# Patient Record
Sex: MALE | Race: BLACK OR AFRICAN AMERICAN | NOT HISPANIC OR LATINO | Employment: OTHER | URBAN - METROPOLITAN AREA
[De-identification: names, ages, dates, MRNs, and addresses within clinical notes are randomized per-mention and may not be internally consistent; named-entity substitution may affect disease eponyms.]

---

## 2020-07-17 ENCOUNTER — OFFICE VISIT (OUTPATIENT)
Dept: FAMILY MEDICINE CLINIC | Facility: CLINIC | Age: 70
End: 2020-07-17

## 2020-07-17 VITALS
HEIGHT: 69 IN | TEMPERATURE: 96.8 F | DIASTOLIC BLOOD PRESSURE: 70 MMHG | BODY MASS INDEX: 28.53 KG/M2 | SYSTOLIC BLOOD PRESSURE: 102 MMHG | HEART RATE: 68 BPM | WEIGHT: 192.6 LBS | OXYGEN SATURATION: 96 % | RESPIRATION RATE: 16 BRPM

## 2020-07-17 DIAGNOSIS — M17.11 PRIMARY OSTEOARTHRITIS OF RIGHT KNEE: Primary | ICD-10-CM

## 2020-07-17 DIAGNOSIS — Z12.11 ENCOUNTER FOR SCREENING COLONOSCOPY: ICD-10-CM

## 2020-07-17 DIAGNOSIS — R10.31 RIGHT LOWER QUADRANT ABDOMINAL PAIN: ICD-10-CM

## 2020-07-17 PROBLEM — I10 HYPERTENSION: Status: ACTIVE | Noted: 2020-07-17

## 2020-07-17 PROBLEM — H81.10 BENIGN POSITIONAL VERTIGO: Status: ACTIVE | Noted: 2019-06-26

## 2020-07-17 PROBLEM — H91.13 PRESBYCUSIS, BILATERAL: Status: ACTIVE | Noted: 2019-06-26

## 2020-07-17 PROBLEM — G56.11: Status: ACTIVE | Noted: 2017-01-03

## 2020-07-17 PROBLEM — R25.1 TREMOR: Status: ACTIVE | Noted: 2020-07-17

## 2020-07-17 PROBLEM — F32.9 MAJOR DEPRESSION, SINGLE EPISODE: Status: ACTIVE | Noted: 2019-12-19

## 2020-07-17 PROBLEM — K40.90 INGUINAL HERNIA: Status: ACTIVE | Noted: 2017-10-04

## 2020-07-17 PROBLEM — G56.12: Status: ACTIVE | Noted: 2017-01-03

## 2020-07-17 PROBLEM — J44.9 COPD (CHRONIC OBSTRUCTIVE PULMONARY DISEASE) (HCC): Status: ACTIVE | Noted: 2018-07-23

## 2020-07-17 PROBLEM — H90.3 SENSORY HEARING LOSS, BILATERAL: Status: ACTIVE | Noted: 2019-06-26

## 2020-07-17 PROBLEM — M25.511 RIGHT SHOULDER PAIN: Status: ACTIVE | Noted: 2020-07-17

## 2020-07-17 PROBLEM — K21.9 GASTROESOPHAGEAL REFLUX DISEASE: Status: ACTIVE | Noted: 2020-07-17

## 2020-07-17 PROBLEM — K14.9: Status: ACTIVE | Noted: 2020-07-17

## 2020-07-17 PROCEDURE — 99213 OFFICE O/P EST LOW 20 MIN: CPT | Performed by: INTERNAL MEDICINE

## 2020-07-17 RX ORDER — LISINOPRIL 10 MG/1
5 TABLET ORAL DAILY
COMMUNITY
Start: 2020-04-23 | End: 2021-07-02

## 2020-07-17 RX ORDER — DIPHENHYDRAMINE HYDROCHLORIDE 25 MG/1
TABLET ORAL DAILY
COMMUNITY

## 2020-07-17 RX ORDER — ZINC GLUCONATE 50 MG
TABLET ORAL DAILY
COMMUNITY

## 2020-07-17 RX ORDER — ASPIRIN 81 MG/1
81 TABLET ORAL DAILY
COMMUNITY
Start: 2015-11-06

## 2020-07-17 RX ORDER — PROPRANOLOL HYDROCHLORIDE 10 MG/1
10 TABLET ORAL DAILY
COMMUNITY
Start: 2020-06-28 | End: 2020-12-28 | Stop reason: SDUPTHER

## 2020-07-17 RX ORDER — AMOXICILLIN 500 MG
CAPSULE ORAL
COMMUNITY

## 2020-07-17 RX ORDER — NAPROXEN 250 MG/1
375 TABLET ORAL 2 TIMES DAILY WITH MEALS
Qty: 40 TABLET | Refills: 0 | Status: SHIPPED | OUTPATIENT
Start: 2020-07-17 | End: 2021-04-19

## 2020-07-17 RX ORDER — ASCORBIC ACID 500 MG
500 TABLET ORAL DAILY
COMMUNITY

## 2020-07-17 RX ORDER — COVID-19 ANTIGEN TEST
220 KIT MISCELLANEOUS DAILY PRN
COMMUNITY
End: 2021-11-30

## 2020-07-17 RX ORDER — ESCITALOPRAM OXALATE 10 MG/1
10 TABLET ORAL DAILY
COMMUNITY
Start: 2020-07-10 | End: 2021-05-24 | Stop reason: SDUPTHER

## 2020-07-17 RX ORDER — PYRIDOXINE HCL (VITAMIN B6) 100 MG
TABLET ORAL DAILY
COMMUNITY

## 2020-07-18 ENCOUNTER — HOSPITAL ENCOUNTER (OUTPATIENT)
Dept: GENERAL RADIOLOGY | Facility: HOSPITAL | Age: 70
Discharge: HOME | End: 2020-07-18
Admitting: INTERNAL MEDICINE

## 2020-07-18 PROCEDURE — 73562 X-RAY EXAM OF KNEE 3: CPT

## 2020-07-20 ENCOUNTER — TELEPHONE (OUTPATIENT)
Dept: FAMILY MEDICINE CLINIC | Facility: CLINIC | Age: 70
End: 2020-07-20

## 2020-07-20 NOTE — TELEPHONE ENCOUNTER
Do not take Naprosyn.  Take instead Voltaren gel 1% to be applied twice a day to the knee.  This can be obtained over-the-counter.

## 2020-07-20 NOTE — TELEPHONE ENCOUNTER
Patient called stating that he was prescribed naproxen and on his med information sheet it shows that it could interact with his lisinopril and aspirin medications. Patient has not started the Naproxen due to this. Please advise.     Patient call back: [phone].

## 2020-08-17 ENCOUNTER — OFFICE VISIT (OUTPATIENT)
Dept: FAMILY MEDICINE CLINIC | Facility: CLINIC | Age: 70
End: 2020-08-17

## 2020-08-17 VITALS
TEMPERATURE: 97 F | HEART RATE: 68 BPM | SYSTOLIC BLOOD PRESSURE: 112 MMHG | HEIGHT: 69 IN | BODY MASS INDEX: 28.38 KG/M2 | OXYGEN SATURATION: 97 % | DIASTOLIC BLOOD PRESSURE: 80 MMHG | WEIGHT: 191.6 LBS | RESPIRATION RATE: 16 BRPM

## 2020-08-17 DIAGNOSIS — I10 ESSENTIAL HYPERTENSION: ICD-10-CM

## 2020-08-17 DIAGNOSIS — M17.11 PRIMARY OSTEOARTHRITIS OF RIGHT KNEE: Primary | ICD-10-CM

## 2020-08-17 PROCEDURE — 99213 OFFICE O/P EST LOW 20 MIN: CPT | Performed by: INTERNAL MEDICINE

## 2020-08-21 NOTE — PROGRESS NOTES
07/17/2020    CC: Leg Pain (right leg pain radiates up)  .        HPI  Knee Pain    The incident occurred more than 1 week ago. There was no injury mechanism. The pain is present in the right knee. The quality of the pain is described as aching. The pain is at a severity of 4/10. The pain has been fluctuating since onset. The symptoms are aggravated by movement. He has tried acetaminophen for the symptoms. The treatment provided no relief.        Abel Aguilar is a 70 y.o. male.      The following portions of the patient's history were reviewed and updated as appropriate: allergies, current medications, past family history, past medical history, past social history, past surgical history and problem list.    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, left   • 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, right   • Essential hypertension, benign   • Disease of tongue   • Gastroesophageal reflux disease   • Right shoulder pain   • Tremor   • Abnormal electrocardiogram   • Acute chest pain   • Benign essential tremor   • Carpal tunnel syndrome, bilateral   • Hypertension   • Inguinal hernia   • Memory loss   • PVC (premature ventricular contraction)       Past Medical History  Past Medical History:   Diagnosis Date   • Benign positional vertigo 6/26/2019   • COPD (chronic obstructive pulmonary disease) (CMS/[facility]) 7/23/2018   • Disease of tongue 7/17/2020   • Essential hypertension, benign 7/9/2012   • Gastroesophageal reflux disease 7/17/2020   • Major depression, single episode 12/19/2019   • 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, right 1/3/2017   • 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 No pertinent surgical history.    Family History  History reviewed. No pertinent family history.    Social History  Social History    Tobacco Use      Smoking status: Former Smoker        Packs/day: 0.50        Years: 5.00        Pack years: 2.5        Types: Cigarettes        Quit date:         Years since quittin.6      Smokeless tobacco: Never Used       Is the Patient a current tobacco user? No    Allergies  No Known Allergies    Current Medications    Current Outpatient Medications:   •  aspirin (aspirin) 81 MG EC tablet, Take 81 mg by mouth Daily., Disp: , Rfl:   •  Biotin (Biotin 5000) 5 MG capsule, Take  by mouth Daily., Disp: , Rfl:   •  escitalopram (LEXAPRO) 10 MG tablet, Take 10 mg by mouth Daily., Disp: , Rfl:   •  lisinopril (PRINIVIL,ZESTRIL) 10 MG tablet, Take 10 mg by mouth Daily., Disp: , Rfl:   •  Misc Natural Products (OSTEO BI-FLEX TRIPLE STRENGTH PO), Take  by mouth., Disp: , Rfl:   •  Multiple Vitamins-Minerals (CENTRUM SILVER ADULT 50+ PO), Take  by mouth Daily., Disp: , Rfl:   •  Naproxen Sodium (Aleve) 220 MG capsule, Take 220 mg by mouth Daily As Needed., Disp: , Rfl:   •  Omega-3 Fatty Acids (FISH OIL) 1200 MG capsule capsule, Take  by mouth., Disp: , Rfl:   •  propranolol (INDERAL) 10 MG tablet, Take 10 mg by mouth Daily., Disp: , Rfl:   •  pyridoxine (VITAMIN B-6) 100 MG tablet tablet, Take  by mouth Daily., Disp: , Rfl:   •  vitamin C (ASCORBIC ACID) 500 MG tablet, Take 500 mg by mouth Daily., Disp: , Rfl:   •  VITAMIN D, CHOLECALCIFEROL, PO, Take  by mouth Daily., Disp: , Rfl:   •  Zinc 50 MG tablet, Take  by mouth Daily., Disp: , Rfl:   •  diclofenac (VOLTAREN) 1 % gel gel, Apply 4 g topically to the appropriate area as directed 4 (Four) Times a Day As Needed., Disp: , Rfl:   •  naproxen (Naprosyn) 250 MG tablet, Take 1.5 tablets by mouth 2 (Two) Times a Day With Meals., Disp: 40 tablet, Rfl:  0     Review of System  Review of Systems   Constitutional: Negative.    Respiratory: Negative.    Cardiovascular: Negative.    Gastrointestinal: Negative.    Musculoskeletal: Negative.    Skin: Negative.    Psychiatric/Behavioral: Negative.      I have reviewed and confirmed the accuracy of the ROS as documented by the MA/LPN/RN Tray Padron MD    Vitals:    07/17/20 1622   BP: 102/70   Pulse: 68   Resp: 16   Temp: 96.8 °F (36 °C)   SpO2: 96%     Body mass index is 28.44 kg/m².    Objective     No visits with results within 30 Day(s) from this visit.   Latest known visit with results is:   No results found for any previous visit.       Physical Exam  Physical Exam   Constitutional: He is oriented to person, place, and time. He appears well-developed and well-nourished.   Eyes: EOM are normal.   Cardiovascular: Normal rate, regular rhythm and normal heart sounds.   Pulmonary/Chest: Effort normal and breath sounds normal.   Abdominal: Soft. Bowel sounds are normal.   Musculoskeletal: Normal range of motion.   Neurological: He is alert and oriented to person, place, and time.   Skin: Skin is warm and dry.   Psychiatric: He has a normal mood and affect. His behavior is normal.   Nursing note and vitals reviewed.      Assessment/Plan   Saleem was seen today for leg pain.    This patient complained of 2-3 months of pain in the right knee.  He denied any trauma such as falls.  He relates that the pain has been gradually increasing.  Tylenol did not help.  He relates she's had some mild right sided abdominal pain for about 3-4 weeks.      Examination as above was unremarkable with the exception of right knee enlargement consistent with degenerative joint disease.  Abdominal exam was unremarkable.    I feel that we're probably looking at degenerative joint disease of the right knee.  Further evaluation is pending.    Patient is past due on his colonoscopy.    Plan #1.)  X-ray of the right knee to evaluate for  degenerative joint disease  #2.)  Schedule patient for colonoscopy  #3.)  Start Naprosyn 375 mg 1 tab by mouth twice a day  #4.)  Follow-up in 3-4 weeks.              Diagnoses and all orders for this visit:    Primary osteoarthritis of right knee  -     naproxen (Naprosyn) 250 MG tablet; Take 1.5 tablets by mouth 2 (Two) Times a Day With Meals.  -     XR Knee 3 View Right    Right lower quadrant abdominal pain    Encounter for screening colonoscopy  -     Ambulatory Referral For Screening Colonoscopy             Tray Padron MD  07/17/2020

## 2020-09-03 ENCOUNTER — PREP FOR SURGERY (OUTPATIENT)
Dept: OTHER | Facility: HOSPITAL | Age: 70
End: 2020-09-03

## 2020-09-03 DIAGNOSIS — Z12.11 SCREENING FOR COLON CANCER: Primary | ICD-10-CM

## 2020-09-04 PROBLEM — Z12.11 SCREENING FOR COLON CANCER: Status: ACTIVE | Noted: 2020-09-04

## 2020-09-07 NOTE — PROGRESS NOTES
08/17/2020    CC: Follow-up (right knee pain)  .        HPI  Knee Pain    The incident occurred 5 to 7 days ago. The incident occurred at home. There was no injury mechanism. The pain is present in the right knee. The pain is at a severity of 0/10. The patient is experiencing no pain. The pain has been fluctuating since onset.        Abel Aguilar is a 70 y.o. male.      The following portions of the patient's history were reviewed and updated as appropriate: allergies, current medications, past family history, past medical history, past social history, past surgical history and problem list.    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, left   • 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, right   • Essential hypertension, benign   • Disease of tongue   • Gastroesophageal reflux disease   • Right shoulder pain   • Tremor   • Abnormal electrocardiogram   • Acute chest pain   • Benign essential tremor   • Carpal tunnel syndrome, bilateral   • Hypertension   • Inguinal hernia   • Memory loss   • PVC (premature ventricular contraction)   • Screening for colon cancer       Past Medical History  Past Medical History:   Diagnosis Date   • Benign positional vertigo 6/26/2019   • COPD (chronic obstructive pulmonary disease) (CMS/[facility]) 7/23/2018   • Disease of tongue 7/17/2020   • Essential hypertension, benign 7/9/2012   • Gastroesophageal reflux disease 7/17/2020   • Major depression, single episode 12/19/2019   • 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, right 1/3/2017   • 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 No  pertinent surgical history.    Family History  History reviewed. No pertinent family history.    Social History  Social History    Tobacco Use      Smoking status: Former Smoker        Packs/day: 0.50        Years: 5.00        Pack years: 2.5        Types: Cigarettes        Quit date:         Years since quittin.7      Smokeless tobacco: Never Used       Is the Patient a current tobacco user? No    Allergies  No Known Allergies    Current Medications    Current Outpatient Medications:   •  aspirin (aspirin) 81 MG EC tablet, Take 81 mg by mouth Daily., Disp: , Rfl:   •  Biotin (Biotin 5000) 5 MG capsule, Take  by mouth Daily., Disp: , Rfl:   •  diclofenac (VOLTAREN) 1 % gel gel, Apply 4 g topically to the appropriate area as directed 4 (Four) Times a Day As Needed., Disp: , Rfl:   •  escitalopram (LEXAPRO) 10 MG tablet, Take 10 mg by mouth Daily., Disp: , Rfl:   •  lisinopril (PRINIVIL,ZESTRIL) 10 MG tablet, Take 10 mg by mouth Daily., Disp: , Rfl:   •  Misc Natural Products (OSTEO BI-FLEX TRIPLE STRENGTH PO), Take  by mouth., Disp: , Rfl:   •  Multiple Vitamins-Minerals (CENTRUM SILVER ADULT 50+ PO), Take  by mouth Daily., Disp: , Rfl:   •  Naproxen Sodium (Aleve) 220 MG capsule, Take 220 mg by mouth Daily As Needed., Disp: , Rfl:   •  Omega-3 Fatty Acids (FISH OIL) 1200 MG capsule capsule, Take  by mouth., Disp: , Rfl:   •  propranolol (INDERAL) 10 MG tablet, Take 10 mg by mouth Daily., Disp: , Rfl:   •  pyridoxine (VITAMIN B-6) 100 MG tablet tablet, Take  by mouth Daily., Disp: , Rfl:   •  vitamin C (ASCORBIC ACID) 500 MG tablet, Take 500 mg by mouth Daily., Disp: , Rfl:   •  VITAMIN D, CHOLECALCIFEROL, PO, Take  by mouth Daily., Disp: , Rfl:   •  Zinc 50 MG tablet, Take  by mouth Daily., Disp: , Rfl:   •  naproxen (Naprosyn) 250 MG tablet, Take 1.5 tablets by mouth 2 (Two) Times a Day With Meals., Disp: 40 tablet, Rfl: 0     Review of System  Review of Systems   Constitutional: Negative.    Eyes:  Negative.    Cardiovascular: Negative.    Musculoskeletal: Negative.    Skin: Negative.    Psychiatric/Behavioral: Negative.      I have reviewed and confirmed the accuracy of the ROS as documented by the MA/LPN/RN Tray Padron MD    Vitals:    08/17/20 1448   BP: 112/80   Pulse: 68   Resp: 16   Temp: 97 °F (36.1 °C)   SpO2: 97%     Body mass index is 28.29 kg/m².    Objective     No visits with results within 30 Day(s) from this visit.   Latest known visit with results is:   No results found for any previous visit.       Physical Exam  Physical Exam   Constitutional: He is oriented to person, place, and time. He appears well-developed and well-nourished.   Neck: Normal range of motion. Neck supple.   Cardiovascular: Normal rate, regular rhythm and normal heart sounds.   Musculoskeletal: Normal range of motion.   Neurological: He is alert and oriented to person, place, and time.   Psychiatric: He has a normal mood and affect. His behavior is normal.   Nursing note and vitals reviewed.      Assessment/Plan      This patient presented for follow-up of right knee pain.  In the interim of visits he had a x-ray of the right knee which we felt was degenerative joint disease.  The x-ray confirmed that this is severe in nature.  The patient relates that the pain and discomfort is in the past as it is considerably he scores it now as a 1/10 in severity.  He did use some Voltaren gel which she relates help quite a bit.  He relates he is also uses Osteo Bi-Flex in the past will with the minimum to moderate interval improvement.    As the x-ray shows severe DJD we discussed with them the likelihood that down the road he may need TKR.  The patient was diverted avoid surgery as much as possible at this point and certainly does not want referral to orthopedic surgery at this time.  He has agreed to except for referral if pain becomes more intense and we are not seeing improvement with the Voltaren gel.    The patient is  overall very pleased with the improvement in his knee discomfort.  And feels that he can continue his duties around the house and it worked without braces etc.    Patient's blood pressures well-controlled today at 120/80 in the left arm sitting position standard cuff.  He is currently on lisinopril and propanolol.    We asked the patient to continue the Voltaren gel you started as an outpatient.  We'll see him back for follow-up in about 4 months earlier if there is a resumption and pain discomfort or decreased range of motion.    Saleem was seen today for follow-up.    Diagnoses and all orders for this visit:    Primary osteoarthritis of right knee    Essential hypertension      Plan:  #1.)  Will refer to Dr. Rodriguez if the problem becomes more acute but for now we'll continue the Voltaren gel for the right knee.  #2.)  Follow-up in about 4 months for reevaluation and for follow-up of hypertension and depression.       Tray Padron MD  08/17/2020

## 2020-09-11 ENCOUNTER — TRANSCRIBE ORDERS (OUTPATIENT)
Dept: SLEEP MEDICINE | Facility: HOSPITAL | Age: 70
End: 2020-09-11

## 2020-09-11 DIAGNOSIS — Z01.818 OTHER SPECIFIED PRE-OPERATIVE EXAMINATION: Primary | ICD-10-CM

## 2020-09-14 ENCOUNTER — LAB (OUTPATIENT)
Dept: LAB | Facility: HOSPITAL | Age: 70
End: 2020-09-14

## 2020-09-14 DIAGNOSIS — Z01.818 OTHER SPECIFIED PRE-OPERATIVE EXAMINATION: ICD-10-CM

## 2020-09-14 PROCEDURE — C9803 HOPD COVID-19 SPEC COLLECT: HCPCS

## 2020-09-14 PROCEDURE — U0004 COV-19 TEST NON-CDC HGH THRU: HCPCS

## 2020-09-15 LAB — SARS-COV-2 RNA RESP QL NAA+PROBE: NOT DETECTED

## 2020-09-16 ENCOUNTER — ANESTHESIA EVENT (OUTPATIENT)
Dept: GASTROENTEROLOGY | Facility: HOSPITAL | Age: 70
End: 2020-09-16

## 2020-09-16 ENCOUNTER — HOSPITAL ENCOUNTER (OUTPATIENT)
Facility: HOSPITAL | Age: 70
Setting detail: HOSPITAL OUTPATIENT SURGERY
Discharge: HOME | End: 2020-09-16
Attending: SURGERY | Admitting: SURGERY

## 2020-09-16 ENCOUNTER — ANESTHESIA (OUTPATIENT)
Dept: GASTROENTEROLOGY | Facility: HOSPITAL | Age: 70
End: 2020-09-16

## 2020-09-16 VITALS
DIASTOLIC BLOOD PRESSURE: 88 MMHG | SYSTOLIC BLOOD PRESSURE: 135 MMHG | OXYGEN SATURATION: 99 % | RESPIRATION RATE: 16 BRPM | HEART RATE: 49 BPM

## 2020-09-16 PROCEDURE — G0121 COLON CA SCRN NOT HI RSK IND: HCPCS | Performed by: SURGERY

## 2020-09-16 PROCEDURE — 25010000002 PROPOFOL 10 MG/ML EMULSION: Performed by: NURSE ANESTHETIST, CERTIFIED REGISTERED

## 2020-09-16 PROCEDURE — S0260 H&P FOR SURGERY: HCPCS | Performed by: SURGERY

## 2020-09-16 RX ORDER — PROPOFOL 10 MG/ML
VIAL (ML) INTRAVENOUS AS NEEDED
Status: DISCONTINUED | OUTPATIENT
Start: 2020-09-16 | End: 2020-09-16 | Stop reason: SURG

## 2020-09-16 RX ORDER — LIDOCAINE HYDROCHLORIDE 20 MG/ML
INJECTION, SOLUTION INFILTRATION; PERINEURAL AS NEEDED
Status: DISCONTINUED | OUTPATIENT
Start: 2020-09-16 | End: 2020-09-16 | Stop reason: SURG

## 2020-09-16 RX ORDER — PROPOFOL 10 MG/ML
VIAL (ML) INTRAVENOUS CONTINUOUS PRN
Status: DISCONTINUED | OUTPATIENT
Start: 2020-09-16 | End: 2020-09-16 | Stop reason: SURG

## 2020-09-16 RX ORDER — SODIUM CHLORIDE, SODIUM LACTATE, POTASSIUM CHLORIDE, CALCIUM CHLORIDE 600; 310; 30; 20 MG/100ML; MG/100ML; MG/100ML; MG/100ML
30 INJECTION, SOLUTION INTRAVENOUS CONTINUOUS PRN
Status: DISCONTINUED | OUTPATIENT
Start: 2020-09-16 | End: 2020-09-16 | Stop reason: HOSPADM

## 2020-09-16 RX ADMIN — PROPOFOL 160 MCG/KG/MIN: 10 INJECTION, EMULSION INTRAVENOUS at 11:29

## 2020-09-16 RX ADMIN — LIDOCAINE HYDROCHLORIDE 50 MG: 20 INJECTION, SOLUTION INFILTRATION; PERINEURAL at 11:29

## 2020-09-16 RX ADMIN — SODIUM CHLORIDE, POTASSIUM CHLORIDE, SODIUM LACTATE AND CALCIUM CHLORIDE 30 ML/HR: 600; 310; 30; 20 INJECTION, SOLUTION INTRAVENOUS at 09:59

## 2020-09-16 RX ADMIN — PROPOFOL 100 MG: 10 INJECTION, EMULSION INTRAVENOUS at 11:29

## 2020-09-16 NOTE — H&P
CHIEF COMPLAINT:    Colorectal cancer screening.    HISTORY OF PRESENT ILLNESS:    Saleem Aguilar is a 70 y.o. male who is here today for a screening exam for colorectal cancer. He has a negative immediate family history, and is currently asymptomatic with regard to GI complaints.     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 No pertinent family history.    Social History     Socioeconomic History   • Marital status:      Spouse name: Not on file   • Number of children: Not on file   • Years of education: Not on file   • Highest education level: Not on file   Tobacco Use   • Smoking status: Former Smoker     Packs/day: 0.50     Years: 5.00     Pack years: 2.50     Types: Cigarettes     Quit date:      Years since quittin.7   • Smokeless tobacco: Never Used   Substance and Sexual Activity   • Alcohol use: Never     Frequency: Never   • Drug use: Never   • Sexual activity: Yes     Partners: Female       Review of Systems   Constitutional: Positive for chills.   HENT: Positive for hearing loss, rhinorrhea,  sneezing and tinnitus.    Eyes: Positive for itching and visual disturbance.   Respiratory: Positive for cough.    Cardiovascular: Positive for leg swelling.   Gastrointestinal: Positive for abdominal pain.   Endocrine: Positive for polyuria.   Genitourinary: Positive for frequency.   Musculoskeletal: Positive for back pain and joint swelling.   Allergic/Immunologic: Positive for environmental allergies.   Psychiatric/Behavioral: Positive for decreased concentration.   All other systems reviewed and are negative.      Objective     /87 (BP Location: Left arm, Patient Position: Lying)   Pulse 56   Resp 12   SpO2 98%     Physical Exam  Vitals signs reviewed.   Constitutional:       Appearance: He is well-developed.   HENT:      Head: Normocephalic and atraumatic.   Eyes:      General: No scleral icterus.     Conjunctiva/sclera: Conjunctivae normal.   Cardiovascular:      Rate and Rhythm: Normal rate and regular rhythm.      Heart sounds: Normal heart sounds. No murmur.   Pulmonary:      Effort: Pulmonary effort is normal. No respiratory distress.      Breath sounds: Normal breath sounds. No wheezing or rales.   Abdominal:      General: Bowel sounds are normal.      Palpations: Abdomen is soft.      Tenderness: There is no abdominal tenderness. There is no guarding.   Musculoskeletal:         General: No deformity.   Skin:     General: Skin is warm and dry.   Neurological:      Mental Status: He is alert and oriented to person, place, and time.         DIAGNOSTIC DATA:    None reviewed    ASSESSMENT:    Colorectal cancer screening    PLAN:    Uneventful bowel prep yesterday.  Colonoscopy today.

## 2020-09-16 NOTE — DISCHARGE INSTRUCTIONS
For the next 24 hours patient needs to be with a responsible adult.    For 24 hours DO NOT drive, operate machinery, appliances, drink alcohol, make important decisions or sign legal documents.    Start with a light or bland diet and advance to regular diet as tolerated.    Follow recommendations on procedure report provided by your doctor.    Call Dr Noe for problems [phone]    Problems may include but not limited to: large amounts of bleeding, trouble breathing, repeated vomiting, severe unrelieved pain, fever or chills.

## 2020-09-16 NOTE — OP NOTE
Preoperative diagnosis: Screening for colorectal cancer.  Postoperative diagnosis: Normal colonoscopy.  Procedure: Colonoscopy to terminal ileum.  Attending surgeon: Alejandro Noe M.D.  Anesthesia: MAC  Specimens: None.  Blood loss: None.  Drains: None.  Bowel prep: Excellent.  Scope withdrawal time: 10:19.  Indications:Saleem Aguilar is a 70 y.o. male  who is asymptomatic, and has a negative immediate family history. He presents for screening colonoscopy.  Description of procedure: He is taken to the endoscopy suite and positioned on the stretcher in the left lateral decubitus position. After graduated amounts of propofol were administered, a digital rectal exam was performed. It was normal.   The flexible colonoscope was inserted into the anus and advanced to the level of the cecum without difficulty. The ileocecal valve was identified. The appendiceal orifice was identified and photographed.  The ileocecal valve was traversed.  The distal 10 cm of terminal ileum was evaluated.  It appeared normal.  The intraluminal surface of the colon was examined upon withdrawal scope.  The bowel prep was excellent. There was clear liquid within all segments of the colon that was easily evacuated with the suction channel the scope. There were no diverticula. There were no polyps. There were no vascular malformations. There were no hemorrhoids.  He tolerated the procedure very well, and is returned to the recovery area in stable condition.

## 2020-09-16 NOTE — ANESTHESIA POSTPROCEDURE EVALUATION
Patient: Saleem Aguilar    Procedure Summary     Date: 09/16/20 Room / [facility]:  [facility]    Anesthesia Start: 1127 Anesthesia Stop: 1219    Procedure: COLONOSCOPY TO CECUM AND TERMINAL ILEUM (N/A ) Diagnosis:       Screening for colon cancer      (Screening for colon cancer [Z12.11])    Surgeon: Alejandro Noe MD Provider: Fox Valderrama MD    Anesthesia Type: MAC ASA Status: 3          Anesthesia Type: MAC    Vitals  Vitals Value Taken Time   BP 92/67 09/16/20 1217   Temp     Pulse 59 09/16/20 1217   Resp 16 09/16/20 1217   SpO2 96 % 09/16/20 1217           Post Anesthesia Care and Evaluation    Patient location during evaluation: bedside  Patient participation: complete - patient participated  Level of consciousness: awake and alert  Pain management: adequate  Airway patency: patent  Anesthetic complications: No anesthetic complications    Cardiovascular status: acceptable  Respiratory status: acceptable  Hydration status: acceptable    Comments: BP 92/67 (BP Location: Left arm, Patient Position: Lying)   Pulse 59   Resp 16   SpO2 96%

## 2020-09-16 NOTE — ANESTHESIA PREPROCEDURE EVALUATION
Anesthesia Evaluation     Patient summary reviewed and Nursing notes reviewed   no history of anesthetic complications:  NPO Solid Status: > 8 hours  NPO Liquid Status: > 8 hours           Airway   Mallampati: II  Dental      Pulmonary - normal exam   (+) a smoker Former, COPD,   Cardiovascular - normal exam    (+) hypertension 2 medications or greater,       Neuro/Psych  (+) tremors, numbness, psychiatric history Depression,     GI/Hepatic/Renal/Endo    (+)  GERD,      Musculoskeletal     Abdominal    Substance History      OB/GYN          Other                        Anesthesia Plan    ASA 3     MAC     intravenous induction     Anesthetic plan, all risks, benefits, and alternatives have been provided, discussed and informed consent has been obtained with: patient.

## 2020-12-17 ENCOUNTER — OFFICE VISIT (OUTPATIENT)
Dept: FAMILY MEDICINE CLINIC | Facility: CLINIC | Age: 70
End: 2020-12-17

## 2020-12-17 VITALS
BODY MASS INDEX: 29 KG/M2 | RESPIRATION RATE: 16 BRPM | WEIGHT: 195.8 LBS | OXYGEN SATURATION: 100 % | SYSTOLIC BLOOD PRESSURE: 110 MMHG | DIASTOLIC BLOOD PRESSURE: 76 MMHG | HEIGHT: 69 IN | HEART RATE: 63 BPM | TEMPERATURE: 96 F

## 2020-12-17 DIAGNOSIS — I10 ESSENTIAL HYPERTENSION: ICD-10-CM

## 2020-12-17 DIAGNOSIS — Z00.00 MEDICARE ANNUAL WELLNESS VISIT, SUBSEQUENT: Primary | ICD-10-CM

## 2020-12-17 PROCEDURE — G0439 PPPS, SUBSEQ VISIT: HCPCS | Performed by: INTERNAL MEDICINE

## 2020-12-17 NOTE — PROGRESS NOTES
The ABCs of the Annual Wellness Visit  Subsequent Medicare Wellness Visit    Chief Complaint   Patient presents with   • Medicare Wellness-subsequent       Subjective   History of Present Illness:  Saleem Aguilar is a 70 y.o. male who presents for a Subsequent Medicare Wellness Visit.    HEALTH RISK ASSESSMENT    Recent Hospitalizations:  No hospitalization(s) within the last year.    Current Medical Providers:  Patient Care Team:  Tray Padron MD as PCP - General (Internal Medicine)    Smoking Status:  Social History     Tobacco Use   Smoking Status Former Smoker   • Packs/day: 0.50   • Years: 5.00   • Pack years: 2.50   • Types: Cigarettes   • Quit date:    • Years since quittin.9   Smokeless Tobacco Never Used       Alcohol Consumption:  Social History     Substance and Sexual Activity   Alcohol Use Never   • Frequency: Never       Depression Screen:   PHQ-2/PHQ-9 Depression Screening 2020   Little interest or pleasure in doing things 0   Feeling down, depressed, or hopeless 1   Total Score 1       Fall Risk Screen:  RUPERTO Fall Risk Assessment was completed, and patient is at LOW risk for falls.Assessment completed on:2020    Health Habits and Functional and Cognitive Screening:  Functional & Cognitive Status 2020   Do you have difficulty preparing food and eating? No   Do you have difficulty bathing yourself, getting dressed or grooming yourself? No   Do you have difficulty using the toilet? No   Do you have difficulty moving around from place to place? No   Do you have trouble with steps or getting out of a bed or a chair? No   Current Diet Well Balanced Diet   Dental Exam Up to date   Eye Exam Up to date   Exercise (times per week) 7 times per week   Current Exercise Activities Include Walking   Do you need help using the phone?  No   Are you deaf or do you have serious difficulty hearing?  No   Do you need help with transportation? No   Do you need help shopping? No   Do you need  help preparing meals?  No   Do you need help with housework?  No   Do you need help with laundry? No   Do you need help taking your medications? No   Do you need help managing money? No   Do you ever drive or ride in a car without wearing a seat belt? No   Have you felt unusual stress, anger or loneliness in the last month? No   Who do you live with? Spouse   If you need help, do you have trouble finding someone available to you? No   Have you been bothered in the last four weeks by sexual problems? No   Do you have difficulty concentrating, remembering or making decisions? Yes         Does the patient have evidence of cognitive impairment? No    Asprin use counseling:Does not need ASA (and currently is not on it)    Age-appropriate Screening Schedule:  Refer to the list below for future screening recommendations based on patient's age, sex and/or medical conditions. Orders for these recommended tests are listed in the plan section. The patient has been provided with a written plan.    Health Maintenance   Topic Date Due   • TDAP/TD VACCINES (1 - Tdap) 06/02/1969   • ZOSTER VACCINE (1 of 2) 06/02/2000   • COLONOSCOPY  09/16/2030   • INFLUENZA VACCINE  Completed          The following portions of the patient's history were reviewed and updated as appropriate: allergies, current medications, past family history, past medical history, past social history, past surgical history and problem list.    Outpatient Medications Prior to Visit   Medication Sig Dispense Refill   • aspirin (aspirin) 81 MG EC tablet Take 81 mg by mouth Daily.     • Biotin (Biotin 5000) 5 MG capsule Take  by mouth Daily.     • diclofenac (VOLTAREN) 1 % gel gel Apply 4 g topically to the appropriate area as directed 4 (Four) Times a Day As Needed.     • escitalopram (LEXAPRO) 10 MG tablet Take 10 mg by mouth Daily.     • lisinopril (PRINIVIL,ZESTRIL) 10 MG tablet Take 10 mg by mouth Daily.     • Misc Natural Products (OSTEO BI-FLEX TRIPLE STRENGTH  PO) Take  by mouth.     • Multiple Vitamins-Minerals (CENTRUM SILVER ADULT 50+ PO) Take  by mouth Daily.     • naproxen (Naprosyn) 250 MG tablet Take 1.5 tablets by mouth 2 (Two) Times a Day With Meals. 40 tablet 0   • Naproxen Sodium (Aleve) 220 MG capsule Take 220 mg by mouth Daily As Needed.     • Omega-3 Fatty Acids (FISH OIL) 1200 MG capsule capsule Take  by mouth.     • propranolol (INDERAL) 10 MG tablet Take 10 mg by mouth Daily.     • pyridoxine (VITAMIN B-6) 100 MG tablet tablet Take  by mouth Daily.     • vitamin C (ASCORBIC ACID) 500 MG tablet Take 500 mg by mouth Daily.     • VITAMIN D, CHOLECALCIFEROL, PO Take  by mouth Daily.     • Zinc 50 MG tablet Take  by mouth Daily.       No facility-administered medications prior to visit.        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 Patient has an advance directive in EMR which is still valid.     Review of Systems   Constitutional: Negative.    HENT: Negative.    Eyes: Negative.    Respiratory: Negative.    Cardiovascular: Negative.    Gastrointestinal: Negative.    Genitourinary: Negative.    Musculoskeletal: Negative.    Skin: Negative.    Neurological: Negative.        Compared to one year ago, the patient feels his physical health  "is the same.  Compared to one year ago, the patient feels his mental health is worse.    Reviewed chart for potential of high risk medication in the elderly: no  Reviewed chart for potential of harmful drug interactions in the elderly:no    Objective         Vitals:    12/17/20 1027   BP: 110/76   Pulse: 63   Resp: 16   Temp: 96 °F (35.6 °C)   TempSrc: Temporal   SpO2: 100%   Weight: 88.8 kg (195 lb 12.8 oz)   Height: 175.3 cm (69\")       Body mass index is 28.91 kg/m².  Discussed the patient's BMI with him. The BMI is in the acceptable range.    Physical Exam  Vitals signs and nursing note reviewed.   Constitutional:       Appearance: He is well-developed.   HENT:      Head: Normocephalic and atraumatic.   Eyes:      Conjunctiva/sclera: Conjunctivae normal.   Neck:      Musculoskeletal: Normal range of motion and neck supple.   Cardiovascular:      Rate and Rhythm: Normal rate and regular rhythm.      Heart sounds: Normal heart sounds.   Pulmonary:      Effort: Pulmonary effort is normal.      Breath sounds: Normal breath sounds.   Abdominal:      General: Bowel sounds are normal.      Palpations: Abdomen is soft.   Musculoskeletal: Normal range of motion.   Skin:     General: Skin is warm and dry.   Neurological:      Mental Status: He is alert and oriented to person, place, and time.   Psychiatric:         Behavior: Behavior normal.               Assessment/Plan      This patient presents for Medicare wellness review. He relates he is feeling fine he has had no problems in the interim of visits. He is on lisinopril for hypertension and Escitalopram for mild depression. He is doing very well at this point.    Regarding anticipatory guidance.  We discussed with patient importance of maintaining his blood pressure at normal levels.  His blood pressure is excellent at this point.  Importance of regular exercise was stressed with the goal of obtaining 30 minutes of exercise 3 times a week.  The importance of limiting " his high sodium foods was also emphasized.          Medicare Risks and Personalized Health Plan  CMS Preventative Services Quick Reference  Advance Directive Discussion    The above risks/problems have been discussed with the patient.  Pertinent information has been shared with the patient in the After Visit Summary.  Follow up plans and orders are seen below in the Assessment/Plan Section.    Diagnoses and all orders for this visit:    1. Medicare annual wellness visit, subsequent (Primary):    2. Essential hypertension: Established problem, controlled      Follow Up:  No follow-ups on file.     An After Visit Summary and PPPS were given to the patient.

## 2020-12-18 LAB
ALBUMIN SERPL-MCNC: 4.2 G/DL (ref 3.5–5.2)
ALBUMIN/GLOB SERPL: 1.1 G/DL
ALP SERPL-CCNC: 108 U/L (ref 39–117)
ALT SERPL-CCNC: 23 U/L (ref 1–41)
AST SERPL-CCNC: 24 U/L (ref 1–40)
BILIRUB SERPL-MCNC: 0.4 MG/DL (ref 0–1.2)
BUN SERPL-MCNC: 14 MG/DL (ref 8–23)
BUN/CREAT SERPL: 16.3 (ref 7–25)
CALCIUM SERPL-MCNC: 10.3 MG/DL (ref 8.6–10.5)
CHLORIDE SERPL-SCNC: 103 MMOL/L (ref 98–107)
CHOLEST SERPL-MCNC: 148 MG/DL (ref 0–200)
CHOLEST/HDLC SERPL: 2.51 {RATIO}
CO2 SERPL-SCNC: 28.8 MMOL/L (ref 22–29)
CREAT SERPL-MCNC: 0.86 MG/DL (ref 0.76–1.27)
ERYTHROCYTE [DISTWIDTH] IN BLOOD BY AUTOMATED COUNT: 13 % (ref 12.3–15.4)
GLOBULIN SER CALC-MCNC: 3.7 GM/DL
GLUCOSE SERPL-MCNC: 102 MG/DL (ref 65–99)
HCT VFR BLD AUTO: 45.1 % (ref 37.5–51)
HCV AB S/CO SERPL IA: <0.1 S/CO RATIO (ref 0–0.9)
HDLC SERPL-MCNC: 59 MG/DL (ref 40–60)
HGB BLD-MCNC: 14.5 G/DL (ref 13–17.7)
LDLC SERPL CALC-MCNC: 73 MG/DL (ref 0–100)
MCH RBC QN AUTO: 28.2 PG (ref 26.6–33)
MCHC RBC AUTO-ENTMCNC: 32.2 G/DL (ref 31.5–35.7)
MCV RBC AUTO: 87.6 FL (ref 79–97)
PLATELET # BLD AUTO: 268 10*3/MM3 (ref 140–450)
POTASSIUM SERPL-SCNC: 4.8 MMOL/L (ref 3.5–5.2)
PROT SERPL-MCNC: 7.9 G/DL (ref 6–8.5)
RBC # BLD AUTO: 5.15 10*6/MM3 (ref 4.14–5.8)
SODIUM SERPL-SCNC: 138 MMOL/L (ref 136–145)
TRIGL SERPL-MCNC: 82 MG/DL (ref 0–150)
VLDLC SERPL CALC-MCNC: 16 MG/DL (ref 5–40)
WBC # BLD AUTO: 5.97 10*3/MM3 (ref 3.4–10.8)

## 2020-12-28 RX ORDER — PROPRANOLOL HYDROCHLORIDE 10 MG/1
10 TABLET ORAL DAILY
Qty: 90 TABLET | Refills: 2 | Status: SHIPPED | OUTPATIENT
Start: 2020-12-28 | End: 2021-09-23

## 2020-12-28 RX ORDER — PROPRANOLOL HYDROCHLORIDE 10 MG/1
10 TABLET ORAL DAILY
Status: CANCELLED | OUTPATIENT
Start: 2020-12-28

## 2020-12-29 DIAGNOSIS — Z13.6 SCREENING FOR ISCHEMIC HEART DISEASE: Primary | ICD-10-CM

## 2020-12-30 ENCOUNTER — HOSPITAL ENCOUNTER (OUTPATIENT)
Dept: ULTRASOUND IMAGING | Facility: HOSPITAL | Age: 70
Discharge: HOME | End: 2020-12-30
Admitting: INTERNAL MEDICINE

## 2020-12-30 DIAGNOSIS — Z00.00 MEDICARE ANNUAL WELLNESS VISIT, SUBSEQUENT: ICD-10-CM

## 2020-12-30 PROCEDURE — 76706 US ABDL AORTA SCREEN AAA: CPT

## 2021-03-04 ENCOUNTER — OFFICE VISIT (OUTPATIENT)
Dept: FAMILY MEDICINE CLINIC | Facility: CLINIC | Age: 71
End: 2021-03-04

## 2021-03-04 VITALS
RESPIRATION RATE: 16 BRPM | HEIGHT: 69 IN | HEART RATE: 60 BPM | BODY MASS INDEX: 29.06 KG/M2 | DIASTOLIC BLOOD PRESSURE: 88 MMHG | OXYGEN SATURATION: 100 % | WEIGHT: 196.2 LBS | SYSTOLIC BLOOD PRESSURE: 122 MMHG | TEMPERATURE: 97.8 F

## 2021-03-04 DIAGNOSIS — R25.2 CRAMPS OF LEFT LOWER EXTREMITY: Primary | ICD-10-CM

## 2021-03-04 PROCEDURE — 99213 OFFICE O/P EST LOW 20 MIN: CPT | Performed by: INTERNAL MEDICINE

## 2021-03-04 RX ORDER — AMOXICILLIN 500 MG/1
CAPSULE ORAL
COMMUNITY
Start: 2021-01-25 | End: 2021-03-04

## 2021-03-04 RX ORDER — HYDROCODONE BITARTRATE AND ACETAMINOPHEN 5; 325 MG/1; MG/1
1 TABLET ORAL EVERY 6 HOURS PRN
COMMUNITY
Start: 2021-01-25 | End: 2021-03-04

## 2021-03-04 NOTE — PROGRESS NOTES
03/04/2021    CC: Leg Pain (right leg pain from back of thigh to shin)  .        HPI  Leg Pain   The incident occurred 3 to 5 days ago. The incident occurred at home. The pain is present in the right thigh. The quality of the pain is described as cramping. The pain is at a severity of 5/10. The pain is moderate. The pain has been fluctuating since onset.        Abel Aguilar is a 70 y.o. male.      The following portions of the patient's history were reviewed and updated as appropriate: allergies, current medications, past family history, past medical history, past social history, past surgical history and problem list.    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, left   • 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, right   • Essential hypertension, benign   • Disease of tongue   • Gastroesophageal reflux disease   • Right shoulder pain   • Tremor   • Abnormal electrocardiogram   • Acute chest pain   • Benign essential tremor   • Carpal tunnel syndrome, bilateral   • Hypertension   • Inguinal hernia   • Memory loss   • PVC (premature ventricular contraction)   • Screening for colon cancer       Past Medical History  Past Medical History:   Diagnosis Date   • Benign positional vertigo 6/26/2019   • Cancer (CMS/[facility])    • COPD (chronic obstructive pulmonary disease) (CMS/[facility]) 7/23/2018   • Disease of tongue 7/17/2020   • Essential hypertension, benign 7/9/2012   • Gastroesophageal reflux disease 7/17/2020   • Major depression, single episode 12/19/2019   • 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, right 1/3/2017   • 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 ENDOSCOPY;  Service: General;  Laterality: N/A;  SCREENING  post-- normal   • HERNIA REPAIR     • MENISCECTOMY Right    • PROSTATECTOMY         Family History  History reviewed. No pertinent family history.    Social History  Social History    Tobacco Use      Smoking status: Former Smoker        Packs/day: 0.50        Years: 5.00        Pack years: 2.5        Types: Cigarettes        Quit date:         Years since quittin.2      Smokeless tobacco: Never Used       Is the Patient a current tobacco user? No    Allergies  No Known Allergies    Current Medications    Current Outpatient Medications:   •  aspirin (aspirin) 81 MG EC tablet, Take 81 mg by mouth Daily., Disp: , Rfl:   •  Biotin (Biotin 5000) 5 MG capsule, Take  by mouth Daily., Disp: , Rfl:   •  diclofenac (VOLTAREN) 1 % gel gel, Apply 4 g topically to the appropriate area as directed 4 (Four) Times a Day As Needed., Disp: , Rfl:   •  escitalopram (LEXAPRO) 10 MG tablet, Take 10 mg by mouth Daily., Disp: , Rfl:   •  lisinopril (PRINIVIL,ZESTRIL) 10 MG tablet, Take 10 mg by mouth Daily., Disp: , Rfl:   •  Misc Natural Products (OSTEO BI-FLEX TRIPLE STRENGTH PO), Take  by mouth., Disp: , Rfl:   •  Multiple Vitamins-Minerals (CENTRUM SILVER ADULT 50+ PO), Take  by mouth Daily., Disp: , Rfl:   •  naproxen (Naprosyn) 250 MG tablet, Take 1.5 tablets by mouth 2 (Two) Times a Day With Meals., Disp: 40 tablet, Rfl: 0  •  Naproxen Sodium (Aleve) 220 MG capsule, Take 220 mg by mouth Daily As Needed., Disp: , Rfl:   •  Omega-3 Fatty Acids (FISH OIL) 1200 MG capsule capsule, Take  by mouth., Disp: , Rfl:   •  propranolol (INDERAL) 10 MG tablet, Take 1 tablet by mouth Daily., Disp: 90 tablet, Rfl: 2  •  pyridoxine (VITAMIN B-6) 100 MG tablet  tablet, Take  by mouth Daily., Disp: , Rfl:   •  vitamin C (ASCORBIC ACID) 500 MG tablet, Take 500 mg by mouth Daily., Disp: , Rfl:   •  VITAMIN D, CHOLECALCIFEROL, PO, Take  by mouth Daily., Disp: , Rfl:   •  Zinc 50 MG tablet, Take  by mouth Daily., Disp: , Rfl:      Review of System  Review of Systems   Eyes: Negative.    Respiratory: Negative.    Cardiovascular: Negative.      I have reviewed and confirmed the accuracy of the ROS as documented by the MA/LPN/RN Tray Padron MD    Vitals:    03/04/21 1344   BP: 122/88   Pulse: 60   Resp: 16   Temp: 97.8 °F (36.6 °C)   SpO2: 100%     Body mass index is 28.97 kg/m².    Objective     Physical Exam  Physical Exam  Neck:      Musculoskeletal: Normal range of motion and neck supple.   Pulmonary:      Effort: Pulmonary effort is normal.      Breath sounds: Normal breath sounds.   Musculoskeletal: Normal range of motion.   Skin:     General: Skin is warm and dry.         Assessment/Plan      This patient presents with complaint of cramping in his right thigh off and on for the past few weeks.  He relates his last episode was about 5 to 7 days ago.  He denies any increase in physical exertion or any decrease in exercise etc.  He relates that he is not decrease his fluid intake.  He has not had any diarrhea or frequent urination.    Physical examination was unremarkable.  There was no evidence of any spasm in the right or left quadriceps.  Range of motion appear to be within normal limits.  Good strength against resistance was appreciated in both lower extremities.      2.  Review of his labs from 12/7/2020 from his comprehensive metabolic profile was essentially within normal limits.  These especially his potassium was normal at 4.8 and his calcium was normal at 10.3.    The patient is not on any statin medications.    We instructed the patient in utilizing stretching before going to bed we will reevaluate his electrolyte status.        Diagnoses and all orders for  this visit:    1. Cramps of left lower extremity (Primary)  -     Comprehensive metabolic panel  -     CBC w AUTO Differential    Plan:  1.)  Follow-up in 2 to 3 weeks.  Earlier if cramping becomes more frequent or severe.         Tray Padron MD  03/04/2021

## 2021-03-05 LAB
ALBUMIN SERPL-MCNC: 4.2 G/DL (ref 3.5–5.2)
ALBUMIN/GLOB SERPL: 1.3 G/DL
ALP SERPL-CCNC: 89 U/L (ref 39–117)
ALT SERPL-CCNC: 20 U/L (ref 1–41)
AST SERPL-CCNC: 25 U/L (ref 1–40)
BASOPHILS # BLD AUTO: 0.05 10*3/MM3 (ref 0–0.2)
BASOPHILS NFR BLD AUTO: 1.1 % (ref 0–1.5)
BILIRUB SERPL-MCNC: 0.4 MG/DL (ref 0–1.2)
BUN SERPL-MCNC: 15 MG/DL (ref 8–23)
BUN/CREAT SERPL: 17 (ref 7–25)
CALCIUM SERPL-MCNC: 10.8 MG/DL (ref 8.6–10.5)
CHLORIDE SERPL-SCNC: 104 MMOL/L (ref 98–107)
CO2 SERPL-SCNC: 27.4 MMOL/L (ref 22–29)
CREAT SERPL-MCNC: 0.88 MG/DL (ref 0.76–1.27)
EOSINOPHIL # BLD AUTO: 0.13 10*3/MM3 (ref 0–0.4)
EOSINOPHIL NFR BLD AUTO: 2.8 % (ref 0.3–6.2)
ERYTHROCYTE [DISTWIDTH] IN BLOOD BY AUTOMATED COUNT: 12.9 % (ref 12.3–15.4)
GLOBULIN SER CALC-MCNC: 3.2 GM/DL
GLUCOSE SERPL-MCNC: 81 MG/DL (ref 65–99)
HCT VFR BLD AUTO: 42.6 % (ref 37.5–51)
HGB BLD-MCNC: 14.1 G/DL (ref 13–17.7)
IMM GRANULOCYTES # BLD AUTO: 0.01 10*3/MM3 (ref 0–0.05)
IMM GRANULOCYTES NFR BLD AUTO: 0.2 % (ref 0–0.5)
LYMPHOCYTES # BLD AUTO: 2.09 10*3/MM3 (ref 0.7–3.1)
LYMPHOCYTES NFR BLD AUTO: 45 % (ref 19.6–45.3)
MCH RBC QN AUTO: 28.4 PG (ref 26.6–33)
MCHC RBC AUTO-ENTMCNC: 33.1 G/DL (ref 31.5–35.7)
MCV RBC AUTO: 85.7 FL (ref 79–97)
MONOCYTES # BLD AUTO: 0.39 10*3/MM3 (ref 0.1–0.9)
MONOCYTES NFR BLD AUTO: 8.4 % (ref 5–12)
NEUTROPHILS # BLD AUTO: 1.97 10*3/MM3 (ref 1.7–7)
NEUTROPHILS NFR BLD AUTO: 42.5 % (ref 42.7–76)
NRBC BLD AUTO-RTO: 0 /100 WBC (ref 0–0.2)
PLATELET # BLD AUTO: 274 10*3/MM3 (ref 140–450)
POTASSIUM SERPL-SCNC: 5 MMOL/L (ref 3.5–5.2)
PROT SERPL-MCNC: 7.4 G/DL (ref 6–8.5)
RBC # BLD AUTO: 4.97 10*6/MM3 (ref 4.14–5.8)
SODIUM SERPL-SCNC: 140 MMOL/L (ref 136–145)
WBC # BLD AUTO: 4.64 10*3/MM3 (ref 3.4–10.8)

## 2021-04-19 ENCOUNTER — OFFICE VISIT (OUTPATIENT)
Dept: FAMILY MEDICINE CLINIC | Facility: CLINIC | Age: 71
End: 2021-04-19

## 2021-04-19 VITALS
RESPIRATION RATE: 16 BRPM | HEART RATE: 70 BPM | OXYGEN SATURATION: 100 % | WEIGHT: 196.4 LBS | SYSTOLIC BLOOD PRESSURE: 110 MMHG | TEMPERATURE: 97.3 F | BODY MASS INDEX: 29.09 KG/M2 | HEIGHT: 69 IN | DIASTOLIC BLOOD PRESSURE: 60 MMHG

## 2021-04-19 DIAGNOSIS — F32.9 MAJOR DEPRESSIVE DISORDER WITH SINGLE EPISODE, REMISSION STATUS UNSPECIFIED: Chronic | ICD-10-CM

## 2021-04-19 DIAGNOSIS — I10 ESSENTIAL HYPERTENSION: Primary | Chronic | ICD-10-CM

## 2021-04-19 PROCEDURE — 99213 OFFICE O/P EST LOW 20 MIN: CPT | Performed by: INTERNAL MEDICINE

## 2021-04-19 NOTE — PROGRESS NOTES
04/19/2021    CC: Hypertension (follow up...no other issues)  .        HPI  Hypertension  This is a chronic problem. The problem has been resolved since onset. The problem is controlled.        Abel Aguilar is a 70 y.o. male.      The following portions of the patient's history were reviewed and updated as appropriate: allergies, current medications, past family history, past medical history, past social history, past surgical history and problem list.    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, left   • 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, right   • Essential hypertension, benign   • Disease of tongue   • Gastroesophageal reflux disease   • Right shoulder pain   • Tremor   • Abnormal electrocardiogram   • Acute chest pain   • Benign essential tremor   • Carpal tunnel syndrome, bilateral   • Hypertension   • Inguinal hernia   • Memory loss   • PVC (premature ventricular contraction)   • Screening for colon cancer       Past Medical History  Past Medical History:   Diagnosis Date   • Benign positional vertigo 6/26/2019   • Cancer (CMS/[facility])    • COPD (chronic obstructive pulmonary disease) (CMS/[facility]) 7/23/2018   • Disease of tongue 7/17/2020   • Essential hypertension, benign 7/9/2012   • Gastroesophageal reflux disease 7/17/2020   • Major depression, single episode 12/19/2019   • 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, right 1/3/2017   • 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 No pertinent family history.    Social History  Social History    Tobacco Use      Smoking status: Former Smoker        Packs/day: 0.50        Years: 5.00        Pack years: 2.5        Types: Cigarettes        Quit date:         Years since quittin.3      Smokeless tobacco: Never Used       Is the Patient a current tobacco user? No    Allergies  No Known Allergies    Current Medications    Current Outpatient Medications:   •  aspirin (aspirin) 81 MG EC tablet, Take 81 mg by mouth Daily., Disp: , Rfl:   •  Biotin (Biotin 5000) 5 MG capsule, Take  by mouth Daily., Disp: , Rfl:   •  diclofenac (VOLTAREN) 1 % gel gel, Apply 4 g topically to the appropriate area as directed 4 (Four) Times a Day As Needed., Disp: , Rfl:   •  escitalopram (LEXAPRO) 10 MG tablet, Take 10 mg by mouth Daily., Disp: , Rfl:   •  lisinopril (PRINIVIL,ZESTRIL) 10 MG tablet, Take 10 mg by mouth Daily., Disp: , Rfl:   •  Misc Natural Products (OSTEO BI-FLEX TRIPLE STRENGTH PO), Take  by mouth., Disp: , Rfl:   •  Multiple Vitamins-Minerals (CENTRUM SILVER ADULT 50+ PO), Take  by mouth Daily., Disp: , Rfl:   •  Naproxen Sodium (Aleve) 220 MG capsule, Take 220 mg by mouth Daily As Needed., Disp: , Rfl:   •  Omega-3 Fatty Acids (FISH OIL) 1200 MG capsule capsule, Take  by mouth., Disp: , Rfl:   •  propranolol (INDERAL) 10 MG tablet, Take 1 tablet by mouth Daily., Disp: 90 tablet, Rfl: 2  •  pyridoxine (VITAMIN B-6) 100 MG tablet tablet, Take  by mouth Daily., Disp: , Rfl:   •  vitamin C (ASCORBIC ACID) 500 MG tablet, Take 500 mg by mouth Daily., Disp: , Rfl:   •  VITAMIN D, CHOLECALCIFEROL, PO, Take  by mouth Daily., Disp: , Rfl:   •  Zinc 50 MG tablet, Take  by mouth Daily., Disp: , Rfl:      Review of System  Review  of Systems   Respiratory: Negative.    Cardiovascular: Negative.      I have reviewed and confirmed the accuracy of the ROS as documented by the MA/LPN/RN Tray Padron MD    Vitals:    04/19/21 1000   BP: 110/60   Pulse: 70   Resp: 16   Temp: 97.3 °F (36.3 °C)   SpO2: 100%     Body mass index is 29 kg/m².    Objective     Physical Exam  Physical Exam  Cardiovascular:      Rate and Rhythm: Normal rate and regular rhythm.      Heart sounds: Normal heart sounds.   Pulmonary:      Effort: Pulmonary effort is normal.         Assessment/Plan      This patient presents for follow-up of cramping and hypertension.  We have given him some nightly exercises to use for stretching to help with cramping he relates that he use these at first did not have any cramps but subsequent to that he is dropped off on his exercise knees had just 1 or 2 but certainly not as intense or less than before.  He relates he is taking his blood pressure medicine as prescribed.    His last office visit was 3/4.  I reviewed with the patient his lab labs from that time which showed a normal CBC.  A normal comprehensive metabolic panel.  His blood pressure today was excellent at 110/70 on recheck was 106/70.  He is currently taking lisinopril 10 mg 1 tab p.o. daily.    He has some questions regarding hospitalization and medical tests that are done for follow-up reasons.  We answered his questions which were regarding a article he had read about post hospital syndrome.    Patient relates that he he is stress level has reduced significantly.  He relates that there was a particular problem that was bothering him and this has resolved this may be one of the causes of his decreased blood pressure.        Diagnoses and all orders for this visit:    1. Essential hypertension (Primary)    2. Major depressive disorder with single episode, remission status unspecified         Plan:  1.)  Decrease lisinopril to 5 mg p.o. every morning.  2.)  Follow-up in 2  months for recheck of blood pressure.  3.)  The patient is urged to continue his low-sodium diet.    Tray Padron MD  04/19/2021

## 2021-04-22 PROCEDURE — 99284 EMERGENCY DEPT VISIT MOD MDM: CPT

## 2021-04-22 PROCEDURE — 93010 ELECTROCARDIOGRAM REPORT: CPT | Performed by: INTERNAL MEDICINE

## 2021-04-22 PROCEDURE — 93005 ELECTROCARDIOGRAM TRACING: CPT

## 2021-04-22 PROCEDURE — 93005 ELECTROCARDIOGRAM TRACING: CPT | Performed by: EMERGENCY MEDICINE

## 2021-04-22 PROCEDURE — 36415 COLL VENOUS BLD VENIPUNCTURE: CPT

## 2021-04-22 RX ORDER — SODIUM CHLORIDE 0.9 % (FLUSH) 0.9 %
10 SYRINGE (ML) INJECTION AS NEEDED
Status: DISCONTINUED | OUTPATIENT
Start: 2021-04-22 | End: 2021-04-23 | Stop reason: HOSPADM

## 2021-04-23 ENCOUNTER — APPOINTMENT (OUTPATIENT)
Dept: CT IMAGING | Facility: HOSPITAL | Age: 71
End: 2021-04-23

## 2021-04-23 ENCOUNTER — HOSPITAL ENCOUNTER (EMERGENCY)
Facility: HOSPITAL | Age: 71
Discharge: HOME | End: 2021-04-23
Attending: EMERGENCY MEDICINE | Admitting: EMERGENCY MEDICINE

## 2021-04-23 ENCOUNTER — TELEPHONE (OUTPATIENT)
Dept: FAMILY MEDICINE CLINIC | Facility: CLINIC | Age: 71
End: 2021-04-23

## 2021-04-23 VITALS
HEIGHT: 69 IN | OXYGEN SATURATION: 97 % | SYSTOLIC BLOOD PRESSURE: 148 MMHG | DIASTOLIC BLOOD PRESSURE: 71 MMHG | RESPIRATION RATE: 16 BRPM | BODY MASS INDEX: 29 KG/M2 | TEMPERATURE: 98.9 F | HEART RATE: 57 BPM

## 2021-04-23 DIAGNOSIS — S01.01XA LACERATION OF SCALP, INITIAL ENCOUNTER: ICD-10-CM

## 2021-04-23 DIAGNOSIS — R55 SYNCOPE, UNSPECIFIED SYNCOPE TYPE: Primary | ICD-10-CM

## 2021-04-23 DIAGNOSIS — S09.90XA CLOSED HEAD INJURY, INITIAL ENCOUNTER: ICD-10-CM

## 2021-04-23 LAB
ALBUMIN SERPL-MCNC: 3.9 G/DL (ref 3.5–5.2)
ALBUMIN/GLOB SERPL: 1.3 G/DL
ALP SERPL-CCNC: 90 U/L (ref 39–117)
ALT SERPL W P-5'-P-CCNC: 25 U/L (ref 1–41)
ANION GAP SERPL CALCULATED.3IONS-SCNC: 5.2 MMOL/L (ref 5–15)
AST SERPL-CCNC: 23 U/L (ref 1–40)
BASOPHILS # BLD AUTO: 0.06 10*3/MM3 (ref 0–0.2)
BASOPHILS NFR BLD AUTO: 1.1 % (ref 0–1.5)
BILIRUB SERPL-MCNC: 0.3 MG/DL (ref 0–1.2)
BUN SERPL-MCNC: 14 MG/DL (ref 8–23)
BUN/CREAT SERPL: 15.6 (ref 7–25)
CALCIUM SPEC-SCNC: 10.2 MG/DL (ref 8.6–10.5)
CHLORIDE SERPL-SCNC: 106 MMOL/L (ref 98–107)
CO2 SERPL-SCNC: 28.8 MMOL/L (ref 22–29)
CREAT SERPL-MCNC: 0.9 MG/DL (ref 0.76–1.27)
DEPRECATED RDW RBC AUTO: 39.9 FL (ref 37–54)
EOSINOPHIL # BLD AUTO: 0.16 10*3/MM3 (ref 0–0.4)
EOSINOPHIL NFR BLD AUTO: 3 % (ref 0.3–6.2)
ERYTHROCYTE [DISTWIDTH] IN BLOOD BY AUTOMATED COUNT: 12.9 % (ref 12.3–15.4)
GFR SERPL CREATININE-BSD FRML MDRD: 101 ML/MIN/1.73
GLOBULIN UR ELPH-MCNC: 3 GM/DL
GLUCOSE SERPL-MCNC: 91 MG/DL (ref 65–99)
HCT VFR BLD AUTO: 41.4 % (ref 37.5–51)
HGB BLD-MCNC: 13.8 G/DL (ref 13–17.7)
HOLD SPECIMEN: NORMAL
HOLD SPECIMEN: NORMAL
IMM GRANULOCYTES # BLD AUTO: 0.03 10*3/MM3 (ref 0–0.05)
IMM GRANULOCYTES NFR BLD AUTO: 0.6 % (ref 0–0.5)
LYMPHOCYTES # BLD AUTO: 1.71 10*3/MM3 (ref 0.7–3.1)
LYMPHOCYTES NFR BLD AUTO: 31.8 % (ref 19.6–45.3)
MAGNESIUM SERPL-MCNC: 2.3 MG/DL (ref 1.6–2.4)
MCH RBC QN AUTO: 28.9 PG (ref 26.6–33)
MCHC RBC AUTO-ENTMCNC: 33.3 G/DL (ref 31.5–35.7)
MCV RBC AUTO: 86.6 FL (ref 79–97)
MONOCYTES # BLD AUTO: 0.49 10*3/MM3 (ref 0.1–0.9)
MONOCYTES NFR BLD AUTO: 9.1 % (ref 5–12)
NEUTROPHILS NFR BLD AUTO: 2.92 10*3/MM3 (ref 1.7–7)
NEUTROPHILS NFR BLD AUTO: 54.4 % (ref 42.7–76)
NRBC BLD AUTO-RTO: 0 /100 WBC (ref 0–0.2)
PLATELET # BLD AUTO: 265 10*3/MM3 (ref 140–450)
PMV BLD AUTO: 8.7 FL (ref 6–12)
POTASSIUM SERPL-SCNC: 4.3 MMOL/L (ref 3.5–5.2)
PROT SERPL-MCNC: 6.9 G/DL (ref 6–8.5)
QT INTERVAL: 418 MS
RBC # BLD AUTO: 4.78 10*6/MM3 (ref 4.14–5.8)
SODIUM SERPL-SCNC: 140 MMOL/L (ref 136–145)
TROPONIN T SERPL-MCNC: <0.01 NG/ML (ref 0–0.03)
WBC # BLD AUTO: 5.37 10*3/MM3 (ref 3.4–10.8)
WHOLE BLOOD HOLD SPECIMEN: NORMAL
WHOLE BLOOD HOLD SPECIMEN: NORMAL

## 2021-04-23 PROCEDURE — 84484 ASSAY OF TROPONIN QUANT: CPT | Performed by: EMERGENCY MEDICINE

## 2021-04-23 PROCEDURE — 36415 COLL VENOUS BLD VENIPUNCTURE: CPT

## 2021-04-23 PROCEDURE — 80053 COMPREHEN METABOLIC PANEL: CPT | Performed by: EMERGENCY MEDICINE

## 2021-04-23 PROCEDURE — 83735 ASSAY OF MAGNESIUM: CPT | Performed by: EMERGENCY MEDICINE

## 2021-04-23 PROCEDURE — 85025 COMPLETE CBC W/AUTO DIFF WBC: CPT | Performed by: EMERGENCY MEDICINE

## 2021-04-23 PROCEDURE — 70450 CT HEAD/BRAIN W/O DYE: CPT

## 2021-04-23 NOTE — TELEPHONE ENCOUNTER
Caller: ADRIANO ALONSO    Relationship to patient: SELF    Best call back number: [phone]    Patient is needing: TO SCHEDULE TO HAVE SUTURES REMOVED IN 10 DAYS . PLEASE ADVISE, THANK YOU!

## 2021-04-23 NOTE — TELEPHONE ENCOUNTER
done   Debridement Text: The wound edges were debrided prior to proceeding with the closure to facilitate wound healing.

## 2021-04-27 ENCOUNTER — OFFICE VISIT (OUTPATIENT)
Dept: FAMILY MEDICINE CLINIC | Facility: CLINIC | Age: 71
End: 2021-04-27

## 2021-04-27 VITALS
HEART RATE: 70 BPM | WEIGHT: 196 LBS | HEIGHT: 69 IN | OXYGEN SATURATION: 98 % | RESPIRATION RATE: 16 BRPM | DIASTOLIC BLOOD PRESSURE: 70 MMHG | SYSTOLIC BLOOD PRESSURE: 110 MMHG | BODY MASS INDEX: 29.03 KG/M2 | TEMPERATURE: 98.6 F

## 2021-04-27 DIAGNOSIS — I10 ESSENTIAL HYPERTENSION: ICD-10-CM
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
## 2021-04-27 PROCEDURE — 99213 OFFICE O/P EST LOW 20 MIN: CPT | Performed by: INTERNAL MEDICINE

## 2021-04-27 NOTE — PROGRESS NOTES
04/27/2021    CC: Follow-up (from CT)  .        HPI  Fall  The accident occurred 3 to 5 days ago. The fall occurred while standing. He fell from a height of 6 to 10 ft. He landed on carpet. There was no blood loss. The point of impact was the head.        Abel Aguilar is a 70 y.o. male.      The following portions of the patient's history were reviewed and updated as appropriate: allergies, current medications, past family history, past medical history, past social history, past surgical history and problem list.    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, left   • 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, right   • Essential hypertension, benign   • Disease of tongue   • Gastroesophageal reflux disease   • Right shoulder pain   • Tremor   • Abnormal electrocardiogram   • Acute chest pain   • Benign essential tremor   • Carpal tunnel syndrome, bilateral   • Hypertension   • Inguinal hernia   • Memory loss   • PVC (premature ventricular contraction)   • Screening for colon cancer       Past Medical History  Past Medical History:   Diagnosis Date   • Benign positional vertigo 6/26/2019   • Cancer (CMS/[facility])    • COPD (chronic obstructive pulmonary disease) (CMS/[facility]) 7/23/2018   • Disease of tongue 7/17/2020   • Essential hypertension, benign 7/9/2012   • Gastroesophageal reflux disease 7/17/2020   • Major depression, single episode 12/19/2019   • 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, right 1/3/2017   • 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 No pertinent family history.    Social History  Social History    Tobacco Use      Smoking status: Former Smoker        Packs/day: 0.50        Years: 5.00        Pack years: 2.5        Types: Cigarettes        Quit date:         Years since quittin.3      Smokeless tobacco: Never Used       Is the Patient a current tobacco user? No    Allergies  No Known Allergies    Current Medications    Current Outpatient Medications:   •  aspirin (aspirin) 81 MG EC tablet, Take 81 mg by mouth Daily., Disp: , Rfl:   •  Biotin (Biotin 5000) 5 MG capsule, Take  by mouth Daily., Disp: , Rfl:   •  diclofenac (VOLTAREN) 1 % gel gel, Apply 4 g topically to the appropriate area as directed 4 (Four) Times a Day As Needed., Disp: , Rfl:   •  escitalopram (LEXAPRO) 10 MG tablet, Take 10 mg by mouth Daily., Disp: , Rfl:   •  lisinopril (PRINIVIL,ZESTRIL) 10 MG tablet, Take 10 mg by mouth Daily., Disp: , Rfl:   •  Misc Natural Products (OSTEO BI-FLEX TRIPLE STRENGTH PO), Take  by mouth., Disp: , Rfl:   •  Multiple Vitamins-Minerals (CENTRUM SILVER ADULT 50+ PO), Take  by mouth Daily., Disp: , Rfl:   •  Naproxen Sodium (Aleve) 220 MG capsule, Take 220 mg by mouth Daily As Needed., Disp: , Rfl:   •  Omega-3 Fatty Acids (FISH OIL) 1200 MG capsule capsule, Take  by mouth., Disp: , Rfl:   •  propranolol (INDERAL) 10 MG tablet, Take 1 tablet by mouth Daily., Disp: 90 tablet, Rfl: 2  •  pyridoxine (VITAMIN B-6) 100 MG tablet tablet, Take  by mouth Daily., Disp: , Rfl:   •  vitamin C (ASCORBIC ACID) 500 MG tablet, Take 500 mg by mouth Daily., Disp: , Rfl:   •  VITAMIN D, CHOLECALCIFEROL, PO, Take  by mouth Daily., Disp: , Rfl:   •  Zinc 50 MG tablet, Take  by  mouth Daily., Disp: , Rfl:      Review of System  Review of Systems   Eyes: Negative.    Respiratory: Negative.    Cardiovascular: Negative.    Gastrointestinal: Negative.    Endocrine: Negative.    Genitourinary: Negative.      I have reviewed and confirmed the accuracy of the ROS as documented by the MA/LPN/RN Tray Padron MD    Vitals:    04/27/21 1057   BP: 110/70   Pulse: 70   Resp: 16   Temp: 98.6 °F (37 °C)   SpO2: 98%     Body mass index is 28.94 kg/m².    Objective     Physical Exam  Physical Exam  HENT:      Head:      Comments: Healing laceration along the left occipital area  Cardiovascular:      Pulses: Normal pulses.      Heart sounds: Normal heart sounds.   Pulmonary:      Breath sounds: Normal breath sounds.   Musculoskeletal:         General: Normal range of motion.   Neurological:      General: No focal deficit present.      Mental Status: He is oriented to person, place, and time.         Assessment/Plan          This 70-year-old patient presents for follow-up today after having been seen in the [facility] emergency room back on 4/23.  The patient relates that he was in his kitchen had drank some liquids became choked coughed a few times and then found himself on the floor.  He apparently fell backwards and sustained a laceration of the left occipital area.  In the emergency room he received some sutures examination was unremarkable admission was not felt necessary.  The patient received a CT scan of the head without contrast and presents today for follow-up on that.    The patient has had no problems since his emergency room encounter.  His CT scan showed no intracranial process or findings with exception of some mild atrophy.  Patient relates that in the interim he has not had any syncope shortness of breath dizziness vertigo or disorientation.  We will see the patient back for removal of his sutures in the next several days.        Diagnoses and all orders for this visit:    1. Fall, initial  encounter (Primary)    2. Essential hypertension             Tray Padron MD  04/27/2021

## 2021-05-01 NOTE — ED PROVIDER NOTES
Laceration Repair    Date/Time: 4/30/2021 9:26 PM  Performed by: Genaro Ochoa III, PA  Authorized by: Naif Holland MD     Consent:     Consent obtained:  Verbal    Consent given by:  Patient    Risks discussed:  Infection, poor cosmetic result and poor wound healing  Laceration details:     Location:  Face    Facial location: Occiput.    Length (cm):  5  Repair type:     Repair type:  Simple  Pre-procedure details:     Preparation:  Imaging obtained to evaluate for foreign bodies and patient was prepped and draped in usual sterile fashion  Treatment:     Area cleansed with:  Hibiclens and saline    Amount of cleaning:  Standard    Irrigation solution:  Sterile saline    Irrigation volume:  500    Irrigation method:  Pressure wash  Skin repair:     Repair method:  Staples    Number of staples:  5  Approximation:     Approximation:  Close  Post-procedure details:     Dressing:  Antibiotic ointment    Patient tolerance of procedure:  Tolerated well, no immediate complications  Laceration Repair    Date/Time: 4/30/2021 9:28 PM  Performed by: Genaro Ochoa III, PA  Authorized by: Naif Holland MD     Consent:     Consent obtained:  Verbal    Consent given by:  Patient    Risks discussed:  Infection, pain, poor wound healing and poor cosmetic result  Anesthesia (see MAR for exact dosages):     Anesthesia method:  Local infiltration    Local anesthetic:  Lidocaine 1% WITH epi  Laceration details:     Location:  Scalp (Occiput)    Length (cm):  4  Repair type:     Repair type:  Simple  Pre-procedure details:     Preparation:  Patient was prepped and draped in usual sterile fashion and imaging obtained to evaluate for foreign bodies  Exploration:     Wound exploration: wound explored through full range of motion and entire depth of wound probed and visualized    Treatment:     Area cleansed with:  Hibiclens and saline    Amount of cleaning:  Extensive    Irrigation solution:  Sterile saline     Irrigation volume:  500    Irrigation method:  Pressure wash  Skin repair:     Repair method:  Staples    Number of staples:  4  Approximation:     Approximation:  Close  Post-procedure details:     Dressing:  Antibiotic ointment    Patient tolerance of procedure:  Tolerated well, no immediate complications         Genaro Ochoa III, PA  04/30/21 8975

## 2021-05-07 ENCOUNTER — OFFICE VISIT (OUTPATIENT)
Dept: FAMILY MEDICINE CLINIC | Facility: CLINIC | Age: 71
End: 2021-05-07

## 2021-05-07 VITALS
OXYGEN SATURATION: 98 % | HEART RATE: 63 BPM | HEIGHT: 69 IN | RESPIRATION RATE: 16 BRPM | SYSTOLIC BLOOD PRESSURE: 110 MMHG | WEIGHT: 196 LBS | BODY MASS INDEX: 29.03 KG/M2 | DIASTOLIC BLOOD PRESSURE: 78 MMHG

## 2021-05-07 DIAGNOSIS — Z48.02 ENCOUNTER FOR STAPLE REMOVAL: Primary | ICD-10-CM

## 2021-05-07 PROCEDURE — 99212 OFFICE O/P EST SF 10 MIN: CPT | Performed by: INTERNAL MEDICINE

## 2021-05-07 NOTE — PROGRESS NOTES
05/07/2021    CC: Suture / Staple Removal (...no other issues)  .        HPI  Suture / Staple Removal  The sutures were placed 3 to 6 days ago. He tried regular soap and water washings since the wound repair. The treatment provided significant relief. There has been no drainage from the wound. There is no redness present. There is no swelling present. There is no pain present. He has no difficulty moving the affected extremity or digit.        Abel Aguilar is a 70 y.o. male.      The following portions of the patient's history were reviewed and updated as appropriate: allergies, current medications, past family history, past medical history, past social history, past surgical history and problem list.    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, left   • 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, right   • Essential hypertension, benign   • Disease of tongue   • Gastroesophageal reflux disease   • Right shoulder pain   • Tremor   • Abnormal electrocardiogram   • Acute chest pain   • Benign essential tremor   • Carpal tunnel syndrome, bilateral   • Hypertension   • Inguinal hernia   • Memory loss   • PVC (premature ventricular contraction)   • Screening for colon cancer       Past Medical History  Past Medical History:   Diagnosis Date   • Benign positional vertigo 6/26/2019   • Cancer (CMS/[facility])    • COPD (chronic obstructive pulmonary disease) (CMS/[facility]) 7/23/2018   • Disease of tongue 7/17/2020   • Essential hypertension, benign 7/9/2012   • Gastroesophageal reflux disease 7/17/2020   • Major depression, single episode 12/19/2019   • 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, right 1/3/2017   • 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 No pertinent family history.    Social History  Social History    Tobacco Use      Smoking status: Former Smoker        Packs/day: 0.50        Years: 5.00        Pack years: 2.5        Types: Cigarettes        Quit date:         Years since quittin.3      Smokeless tobacco: Never Used       Is the Patient a current tobacco user? No    Allergies  No Known Allergies    Current Medications    Current Outpatient Medications:   •  aspirin (aspirin) 81 MG EC tablet, Take 81 mg by mouth Daily., Disp: , Rfl:   •  Biotin (Biotin 5000) 5 MG capsule, Take  by mouth Daily., Disp: , Rfl:   •  diclofenac (VOLTAREN) 1 % gel gel, Apply 4 g topically to the appropriate area as directed 4 (Four) Times a Day As Needed., Disp: , Rfl:   •  escitalopram (LEXAPRO) 10 MG tablet, Take 10 mg by mouth Daily., Disp: , Rfl:   •  lisinopril (PRINIVIL,ZESTRIL) 10 MG tablet, Take 5 mg by mouth Daily., Disp: , Rfl:   •  Misc Natural Products (OSTEO BI-FLEX TRIPLE STRENGTH PO), Take  by mouth., Disp: , Rfl:   •  Multiple Vitamins-Minerals (CENTRUM SILVER ADULT 50+ PO), Take  by mouth Daily., Disp: , Rfl:   •  Naproxen Sodium (Aleve) 220 MG capsule, Take 220 mg by mouth Daily As Needed., Disp: , Rfl:   •  Omega-3 Fatty Acids (FISH OIL) 1200 MG capsule capsule, Take  by mouth., Disp: , Rfl:   •  propranolol (INDERAL) 10 MG tablet, Take 1 tablet by mouth Daily., Disp: 90 tablet, Rfl: 2  •  pyridoxine (VITAMIN B-6) 100 MG tablet tablet, Take  by mouth Daily., Disp: , Rfl:    •  vitamin C (ASCORBIC ACID) 500 MG tablet, Take 500 mg by mouth Daily., Disp: , Rfl:   •  VITAMIN D, CHOLECALCIFEROL, PO, Take  by mouth Daily., Disp: , Rfl:   •  Zinc 50 MG tablet, Take  by mouth Daily., Disp: , Rfl:      Review of System  Review of Systems   Respiratory: Negative.    Cardiovascular: Negative.    Musculoskeletal: Negative.    Skin: Negative.      I have reviewed and confirmed the accuracy of the ROS as documented by the MA/LPN/RN Tray Padron MD    Vitals:    05/07/21 0856   BP: 110/78   Pulse: 63   Resp: 16   SpO2: 98%     Body mass index is 28.94 kg/m².    Objective     Physical Exam  Physical Exam  Cardiovascular:      Rate and Rhythm: Normal rate.      Heart sounds: Normal heart sounds.   Pulmonary:      Breath sounds: Normal breath sounds.         Assessment/Plan    Patient had 7 staples removed from the left occipital region following a laceration approximately week ago.  The wound was well-healed.  Staples were removed uneventfully.  There was no loss of blood.  There is no evidence of infection.          There are no diagnoses linked to this encounter.         Tray Padron MD  05/07/2021

## 2021-05-24 DIAGNOSIS — F32.9 CURRENT EPISODE OF MAJOR DEPRESSIVE DISORDER WITHOUT PRIOR EPISODE, UNSPECIFIED DEPRESSION EPISODE SEVERITY: Primary | ICD-10-CM

## 2021-05-24 RX ORDER — ESCITALOPRAM OXALATE 10 MG/1
10 TABLET ORAL DAILY
Qty: 90 TABLET | Refills: 1 | Status: SHIPPED | OUTPATIENT
Start: 2021-05-24 | End: 2021-11-18 | Stop reason: SDUPTHER

## 2021-05-25 ENCOUNTER — TELEPHONE (OUTPATIENT)
Dept: FAMILY MEDICINE CLINIC | Facility: CLINIC | Age: 71
End: 2021-05-25

## 2021-05-25 NOTE — TELEPHONE ENCOUNTER
Telephone encounter to be sent to the clinical pool   Hub staff attempted to follow warm transfer process and was unsuccessful     Caller: Saleem Aguilar    Relationship to patient: Self    Best call back number: [phone]    Patient is needing: A IN PERSON OFFICE VISIT    Chief complaint: EXTREME PAIN IN SIDE    Type of visit: OFFICE VISIT     Requested date: ASAP    Additional notes: THE PATIENT STATES THAT HE IS IN EXTREME PAIN. THE PATIENT IS HAVING A CONSISTENT PAIN IN HIS LEFT SIDE AROUND HIS KIDNEY. THE PATIENT STATES THAT THERE IS NOTHING THAT HELPS IT FEEL BETTER OTHER THAN LAYING ON A FIRM SURFACE BUT THAT DOESN'T REALLY HELP.

## 2021-06-02 ENCOUNTER — OFFICE VISIT (OUTPATIENT)
Dept: FAMILY MEDICINE CLINIC | Facility: CLINIC | Age: 71
End: 2021-06-02

## 2021-06-02 VITALS
OXYGEN SATURATION: 96 % | HEART RATE: 64 BPM | RESPIRATION RATE: 20 BRPM | HEIGHT: 69 IN | TEMPERATURE: 98.2 F | BODY MASS INDEX: 29.03 KG/M2 | SYSTOLIC BLOOD PRESSURE: 110 MMHG | DIASTOLIC BLOOD PRESSURE: 72 MMHG | WEIGHT: 196 LBS

## 2021-06-02 DIAGNOSIS — M54.42 ACUTE LOW BACK PAIN WITH LEFT-SIDED SCIATICA, UNSPECIFIED BACK PAIN LATERALITY: Primary | ICD-10-CM

## 2021-06-02 PROCEDURE — 99214 OFFICE O/P EST MOD 30 MIN: CPT | Performed by: INTERNAL MEDICINE

## 2021-06-02 RX ORDER — CYCLOBENZAPRINE HCL 10 MG
10 TABLET ORAL
Qty: 10 TABLET | Refills: 0 | Status: SHIPPED | OUTPATIENT
Start: 2021-06-02 | End: 2021-06-12

## 2021-06-06 NOTE — PROGRESS NOTES
06/02/2021    CC: Back Pain  .        HPI  History of Present Illness     Subjective   Saleem Aguilar is a 71 y.o. male.      The following portions of the patient's history were reviewed and updated as appropriate: allergies, current medications, past family history, past medical history, past social history, past surgical history and problem list.    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, left   • 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, right   • Essential hypertension, benign   • Disease of tongue   • Gastroesophageal reflux disease   • Right shoulder pain   • Tremor   • Abnormal electrocardiogram   • Acute chest pain   • Benign essential tremor   • Carpal tunnel syndrome, bilateral   • Hypertension   • Inguinal hernia   • Memory loss   • PVC (premature ventricular contraction)   • Screening for colon cancer       Past Medical History  Past Medical History:   Diagnosis Date   • Benign positional vertigo 6/26/2019   • Cancer (CMS/[facility])    • COPD (chronic obstructive pulmonary disease) (CMS/[facility]) 7/23/2018   • Disease of tongue 7/17/2020   • Essential hypertension, benign 7/9/2012   • Gastroesophageal reflux disease 7/17/2020   • Major depression, single episode 12/19/2019   • 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, right 1/3/2017   • 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 No pertinent family history.    Social History  Social History    Tobacco Use      Smoking status: Former Smoker        Packs/day: 0.50        Years: 5.00        Pack years: 2.5        Types: Cigarettes        Quit date:         Years since quittin.4      Smokeless tobacco: Never Used       Is the Patient a current tobacco user? No    Allergies  No Known Allergies    Current Medications    Current Outpatient Medications:   •  aspirin (aspirin) 81 MG EC tablet, Take 81 mg by mouth Daily., Disp: , Rfl:   •  Biotin (Biotin 5000) 5 MG capsule, Take  by mouth Daily., Disp: , Rfl:   •  diclofenac (VOLTAREN) 1 % gel gel, Apply 4 g topically to the appropriate area as directed 4 (Four) Times a Day As Needed., Disp: , Rfl:   •  escitalopram (LEXAPRO) 10 MG tablet, Take 1 tablet by mouth Daily., Disp: 90 tablet, Rfl: 1  •  lisinopril (PRINIVIL,ZESTRIL) 10 MG tablet, Take 5 mg by mouth Daily., Disp: , Rfl:   •  Misc Natural Products (OSTEO BI-FLEX TRIPLE STRENGTH PO), Take  by mouth., Disp: , Rfl:   •  Multiple Vitamins-Minerals (CENTRUM SILVER ADULT 50+ PO), Take  by mouth Daily., Disp: , Rfl:   •  Naproxen Sodium (Aleve) 220 MG capsule, Take 220 mg by mouth Daily As Needed., Disp: , Rfl:   •  Omega-3 Fatty Acids (FISH OIL) 1200 MG capsule capsule, Take  by mouth., Disp: , Rfl:   •  propranolol (INDERAL) 10 MG tablet, Take 1 tablet by mouth Daily., Disp: 90 tablet, Rfl: 2  •  pyridoxine (VITAMIN B-6) 100 MG tablet tablet, Take  by mouth Daily., Disp: , Rfl:   •  vitamin C (ASCORBIC ACID) 500 MG tablet, Take 500 mg by mouth Daily., Disp: , Rfl:   •  VITAMIN D, CHOLECALCIFEROL, PO, Take  by mouth Daily., Disp: , Rfl:   •  Zinc 50 MG tablet, Take  by mouth Daily., Disp: , Rfl:   •  cyclobenzaprine (FLEXERIL) 10 MG tablet, Take 1 tablet by mouth every night at bedtime for 10 days., Disp: 10 tablet, Rfl: 0     Review of  System  Review of Systems   Respiratory: Negative.    Cardiovascular: Negative.    Gastrointestinal: Negative.    Endocrine: Negative.    Genitourinary: Negative.    Musculoskeletal: Positive for arthralgias and back pain.   Neurological: Negative.      I have reviewed and confirmed the accuracy of the ROS as documented by the MA/LPN/RN Tray Padron MD    Vitals:    06/02/21 1149   BP: 110/72   Pulse: 64   Resp: 20   Temp: 98.2 °F (36.8 °C)   SpO2: 96%     Body mass index is 28.94 kg/m².    Objective     Physical Exam  Physical Exam  HENT:      Head: Normocephalic and atraumatic.   Cardiovascular:      Rate and Rhythm: Normal rate and regular rhythm.      Pulses: Normal pulses.   Pulmonary:      Effort: Pulmonary effort is normal.      Breath sounds: Normal breath sounds.   Musculoskeletal:      Cervical back: Normal range of motion.         Assessment/Plan      This 71-year-old presents at this time with new onset of lower back pain.  He relates this started around May 24 while he was at home he was bending and noticed excruciating pain in his left side.  He has had previous episodes of back pain similar to this.    His blood pressure was well controlled at 110/72 in the left arm sitting position Luis Eduardo cuff today and his pressure back on 5/7 was 110/78.  Examination of the lumbar sacral area shows mild spasm appreciated in the left lumbar sacral area.  Patient's range of motion is inhibited to bending at the waist..  Straight leg raises were negative for elicitation of pain.    Plan:  1.).Flexeril 10 mg 1 tab by mouth daily at bedtime    #2.)  Follow up in the next few weeks.    Diagnoses and all orders for this visit:    1. Acute low back pain with left-sided sciatica, unspecified back pain laterality (Primary)    Other orders  -     cyclobenzaprine (FLEXERIL) 10 MG tablet; Take 1 tablet by mouth every night at bedtime for 10 days.  Dispense: 10 tablet; Refill: 0             Tray Padron  MD  06/02/2021

## 2021-06-18 ENCOUNTER — OFFICE VISIT (OUTPATIENT)
Dept: FAMILY MEDICINE CLINIC | Facility: CLINIC | Age: 71
End: 2021-06-18

## 2021-06-18 VITALS
SYSTOLIC BLOOD PRESSURE: 110 MMHG | BODY MASS INDEX: 29.24 KG/M2 | RESPIRATION RATE: 16 BRPM | WEIGHT: 197.4 LBS | HEIGHT: 69 IN | DIASTOLIC BLOOD PRESSURE: 84 MMHG

## 2021-06-18 DIAGNOSIS — M54.50 ACUTE LEFT-SIDED LOW BACK PAIN WITHOUT SCIATICA: ICD-10-CM

## 2021-06-18 DIAGNOSIS — I10 ESSENTIAL HYPERTENSION: Primary | ICD-10-CM

## 2021-06-18 PROCEDURE — 99213 OFFICE O/P EST LOW 20 MIN: CPT | Performed by: INTERNAL MEDICINE

## 2021-06-22 NOTE — PROGRESS NOTES
06/18/2021    CC: Back Pain (follow up) and Leg Pain (right leg)  .        HPI  Back Pain  This is a chronic problem. The current episode started more than 1 year ago. The problem occurs intermittently. The pain is present in the lumbar spine. The quality of the pain is described as aching. The pain does not radiate. The pain is mild. The symptoms are aggravated by bending, sitting and stress. Associated symptoms include leg pain.   Leg Pain          Abel Aguilar is a 71 y.o. male.      The following portions of the patient's history were reviewed and updated as appropriate: allergies, current medications, past family history, past medical history, past social history, past surgical history and problem list.    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, left   • 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, right   • Essential hypertension, benign   • Disease of tongue   • Gastroesophageal reflux disease   • Right shoulder pain   • Tremor   • Abnormal electrocardiogram   • Acute chest pain   • Benign essential tremor   • Carpal tunnel syndrome, bilateral   • Hypertension   • Inguinal hernia   • Memory loss   • PVC (premature ventricular contraction)   • Screening for colon cancer       Past Medical History  Past Medical History:   Diagnosis Date   • Benign positional vertigo 6/26/2019   • Cancer (CMS/[facility])    • COPD (chronic obstructive pulmonary disease) (CMS/[facility]) 7/23/2018   • Disease of tongue 7/17/2020   • Essential hypertension, benign 7/9/2012   • Gastroesophageal reflux disease 7/17/2020   • Major depression, single episode 12/19/2019   • 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, right 1/3/2017   • 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 No pertinent family history.    Social History  Social History    Tobacco Use      Smoking status: Former Smoker        Packs/day: 0.50        Years: 5.00        Pack years: 2.5        Types: Cigarettes        Quit date:         Years since quittin.5      Smokeless tobacco: Never Used       Is the Patient a current tobacco user? No    Allergies  No Known Allergies    Current Medications    Current Outpatient Medications:   •  aspirin (aspirin) 81 MG EC tablet, Take 81 mg by mouth Daily., Disp: , Rfl:   •  Biotin (Biotin 5000) 5 MG capsule, Take  by mouth Daily., Disp: , Rfl:   •  diclofenac (VOLTAREN) 1 % gel gel, Apply 4 g topically to the appropriate area as directed 4 (Four) Times a Day As Needed., Disp: , Rfl:   •  escitalopram (LEXAPRO) 10 MG tablet, Take 1 tablet by mouth Daily., Disp: 90 tablet, Rfl: 1  •  lisinopril (PRINIVIL,ZESTRIL) 10 MG tablet, Take 5 mg by mouth Daily., Disp: , Rfl:   •  Misc Natural Products (OSTEO BI-FLEX TRIPLE STRENGTH PO), Take  by mouth., Disp: , Rfl:   •  Multiple Vitamins-Minerals (CENTRUM SILVER ADULT 50+ PO), Take  by mouth Daily., Disp: , Rfl:   •  Naproxen Sodium (Aleve) 220 MG capsule, Take 220 mg by mouth Daily As Needed., Disp: , Rfl:   •  Omega-3 Fatty Acids (FISH OIL) 1200 MG capsule capsule, Take  by mouth., Disp: , Rfl:   •  propranolol (INDERAL) 10 MG tablet, Take 1 tablet by mouth Daily., Disp: 90 tablet, Rfl: 2  •  pyridoxine (VITAMIN B-6) 100 MG tablet tablet, Take  by mouth  Daily., Disp: , Rfl:   •  vitamin C (ASCORBIC ACID) 500 MG tablet, Take 500 mg by mouth Daily., Disp: , Rfl:   •  VITAMIN D, CHOLECALCIFEROL, PO, Take  by mouth Daily., Disp: , Rfl:   •  Zinc 50 MG tablet, Take  by mouth Daily., Disp: , Rfl:      Review of System  Review of Systems   Constitutional: Negative.    Eyes: Negative.    Respiratory: Negative.    Cardiovascular: Negative.    Gastrointestinal: Negative.    Musculoskeletal: Positive for back pain.     I have reviewed and confirmed the accuracy of the ROS as documented by the MA/LPN/RN Tray Padron MD    Vitals:    06/18/21 1048   BP: 110/84   Resp: 16     Body mass index is 29.15 kg/m².    Objective     Physical Exam  Physical Exam  Cardiovascular:      Rate and Rhythm: Normal rate and regular rhythm.      Pulses: Normal pulses.      Heart sounds: Normal heart sounds.   Pulmonary:      Effort: Pulmonary effort is normal.   Abdominal:      General: Abdomen is flat.   Musculoskeletal:      Cervical back: Neck supple.         Assessment/Plan      This 71-year-old gentleman presents at this time for follow-up of hypertension.  With his last visit we decreased his lisinopril to 5 mg by mouth daily.  His blood pressure is excellent today at 110/70 but he still complains of occasional leg cramps.  The cramping is not related to exertion but is more spontaneous such as when he is sitting or relaxing.    Patient relates that his back pain has resolved.    His blood pressure is relatively low so we will DC the lisinopril at this point and reevaluate his blood pressure.            Diagnoses and all orders for this visit:    1. Essential hypertension (Primary)    2. Acute left-sided low back pain without sciatica      Plan:  DC lisinopril  #2.)  Follow-up in the next several weeks.       Tray Padron MD  06/18/2021

## 2021-07-02 ENCOUNTER — OFFICE VISIT (OUTPATIENT)
Dept: FAMILY MEDICINE CLINIC | Facility: CLINIC | Age: 71
End: 2021-07-02

## 2021-07-02 VITALS
BODY MASS INDEX: 28.88 KG/M2 | WEIGHT: 195 LBS | RESPIRATION RATE: 16 BRPM | HEIGHT: 69 IN | DIASTOLIC BLOOD PRESSURE: 80 MMHG | SYSTOLIC BLOOD PRESSURE: 126 MMHG

## 2021-07-02 DIAGNOSIS — M79.604 LEG PAIN, ANTERIOR, RIGHT: Primary | ICD-10-CM

## 2021-07-02 DIAGNOSIS — I10 ESSENTIAL HYPERTENSION: ICD-10-CM

## 2021-07-02 PROCEDURE — 99214 OFFICE O/P EST MOD 30 MIN: CPT | Performed by: INTERNAL MEDICINE

## 2021-07-02 RX ORDER — CYCLOBENZAPRINE HCL 10 MG
TABLET ORAL
Qty: 10 TABLET | Refills: 0 | Status: SHIPPED | OUTPATIENT
Start: 2021-07-02 | End: 2021-09-20 | Stop reason: SDUPTHER

## 2021-07-04 NOTE — PROGRESS NOTES
07/02/2021    CC: Leg Pain (right leg pain)  .        HPI  Hypertension  This is a chronic problem. The current episode started 1 to 4 weeks ago. The problem has been resolved since onset. The problem is controlled. There are no associated agents to hypertension. Risk factors for coronary artery disease include male gender, dyslipidemia and diabetes mellitus.        Abel Aguilar is a 71 y.o. male.      The following portions of the patient's history were reviewed and updated as appropriate: allergies, current medications, past family history, past medical history, past social history, past surgical history and problem list.    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, left   • 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, right   • Essential hypertension, benign   • Disease of tongue   • Gastroesophageal reflux disease   • Right shoulder pain   • Tremor   • Abnormal electrocardiogram   • Acute chest pain   • Benign essential tremor   • Carpal tunnel syndrome, bilateral   • Hypertension   • Inguinal hernia   • Memory loss   • PVC (premature ventricular contraction)   • Screening for colon cancer       Past Medical History  Past Medical History:   Diagnosis Date   • Benign positional vertigo 6/26/2019   • Cancer (CMS/[facility])    • COPD (chronic obstructive pulmonary disease) (CMS/[facility]) 7/23/2018   • Disease of tongue 7/17/2020   • Essential hypertension, benign 7/9/2012   • Gastroesophageal reflux disease 7/17/2020   • Major depression, single episode 12/19/2019   • 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, right 1/3/2017   • 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 No pertinent family history.    Social History  Social History    Tobacco Use      Smoking status: Former Smoker        Packs/day: 0.50        Years: 5.00        Pack years: 2.5        Types: Cigarettes        Quit date:         Years since quittin.5      Smokeless tobacco: Never Used       Is the Patient a current tobacco user? No    Allergies  No Known Allergies    Current Medications    Current Outpatient Medications:   •  aspirin (aspirin) 81 MG EC tablet, Take 81 mg by mouth Daily., Disp: , Rfl:   •  Biotin (Biotin 5000) 5 MG capsule, Take  by mouth Daily., Disp: , Rfl:   •  diclofenac (VOLTAREN) 1 % gel gel, Apply 4 g topically to the appropriate area as directed 4 (Four) Times a Day As Needed., Disp: , Rfl:   •  escitalopram (LEXAPRO) 10 MG tablet, Take 1 tablet by mouth Daily., Disp: 90 tablet, Rfl: 1  •  Misc Natural Products (OSTEO BI-FLEX TRIPLE STRENGTH PO), Take  by mouth., Disp: , Rfl:   •  Multiple Vitamins-Minerals (CENTRUM SILVER ADULT 50+ PO), Take  by mouth Daily., Disp: , Rfl:   •  Naproxen Sodium (Aleve) 220 MG capsule, Take 220 mg by mouth Daily As Needed., Disp: , Rfl:   •  Omega-3 Fatty Acids (FISH OIL) 1200 MG capsule capsule, Take  by mouth., Disp: , Rfl:   •  propranolol (INDERAL) 10 MG tablet, Take 1 tablet by mouth Daily., Disp: 90 tablet, Rfl: 2  •  pyridoxine (VITAMIN B-6) 100 MG tablet tablet, Take  by mouth Daily., Disp: , Rfl:   •  vitamin C (ASCORBIC ACID) 500 MG tablet, Take 500 mg by mouth Daily., Disp: , Rfl:   •  VITAMIN D, CHOLECALCIFEROL, PO, Take  by mouth Daily., Disp: ,  Rfl:   •  Zinc 50 MG tablet, Take  by mouth Daily., Disp: , Rfl:   •  cyclobenzaprine (FLEXERIL) 10 MG tablet, 1 tablet qhs and pm prn, Disp: 10 tablet, Rfl: 0     Review of System  Review of Systems   Constitutional: Negative.    Eyes: Negative.    Respiratory: Negative.    Cardiovascular: Negative.    Gastrointestinal: Negative.      I have reviewed and confirmed the accuracy of the ROS as documented by the MA/LPN/RN Tray Padron MD    Vitals:    07/02/21 1451   BP: 126/80   Resp: 16     Body mass index is 28.8 kg/m².    Objective     Physical Exam  Physical Exam  Constitutional:       Appearance: Normal appearance.   Cardiovascular:      Rate and Rhythm: Normal rate.      Pulses: Normal pulses.      Heart sounds: Normal heart sounds.   Pulmonary:      Effort: Pulmonary effort is normal.      Breath sounds: Normal breath sounds.   Abdominal:      General: Abdomen is flat.      Palpations: Abdomen is soft.   Neurological:      Mental Status: He is alert.         Assessment/Plan      This 71-year-old patient presents for follow-up of hypertension and continued cramping in his legs.  With his lst visit we felt that his blood pressure was low or than it should beand we discontinued his lisinopril.  We felt that this may have contributed to his cramping as well but it appears not to be the case as although his blood pressure within normal limits at 126/80 today he continuescomplaining of cramping in his right quad.  He relates that when it does so is 6/10 in intensity.  He did have lumbar sacral back pain with radiation to his Quat 2 months or so ago but now we note  Absence of the back pain.  He relates that the sensation in his right quadriceps area is mobile cramping sensation.  It is not related to exertion or bending and lifting etc.  Review of his labs from 4/23 shows that his comprehensive metabolic panel was entirely within normal limits and his CBC was likewise within normal limits.    Straight leg raises  were normal.  He was able to should demonstrate normal range of motion at the waist.  He is able to touch his knees straightened lower extremities straighted without any difficulty.    I feel we need further evaluation from orthopedics regarding this episodic cramping in his quadriceps on the right.his hypertension is well controlled at this point and we'll continue him off his lisinopril.            Diagnoses and all orders for this visit:    1. Leg pain, anterior, right (Primary)  -     Ambulatory Referral to Orthopedic Surgery    2. Essential hypertension    Other orders  -     cyclobenzaprine (FLEXERIL) 10 MG tablet; 1 tablet qhs and pm prn  Dispense: 10 tablet; Refill: 0    plan:  1.)  Flexeril 10 mg 1 tab by mouth daily when necessary for cramping.  #2.))  Referral to Dr. Isadora bhat for evaluation.  #3.)  Follow-up in one month.         Tray Padron MD  07/02/2021

## 2021-08-31 ENCOUNTER — OFFICE VISIT (OUTPATIENT)
Dept: FAMILY MEDICINE CLINIC | Facility: CLINIC | Age: 71
End: 2021-08-31

## 2021-08-31 VITALS
HEIGHT: 69 IN | SYSTOLIC BLOOD PRESSURE: 110 MMHG | BODY MASS INDEX: 28.73 KG/M2 | DIASTOLIC BLOOD PRESSURE: 82 MMHG | RESPIRATION RATE: 16 BRPM | WEIGHT: 194 LBS

## 2021-08-31 DIAGNOSIS — I10 ESSENTIAL HYPERTENSION: Primary | Chronic | ICD-10-CM

## 2021-08-31 DIAGNOSIS — R25.2 MUSCLE CRAMPS: ICD-10-CM

## 2021-08-31 PROCEDURE — 99213 OFFICE O/P EST LOW 20 MIN: CPT | Performed by: INTERNAL MEDICINE

## 2021-09-03 NOTE — PROGRESS NOTES
08/31/2021    CC: Leg Pain (f/u) and Hypertension (f/u...no other issues)  .        HPI  Hypertension  This is a recurrent problem. The current episode started more than 1 year ago. The problem has been gradually improving since onset. There are no associated agents to hypertension. Risk factors for coronary artery disease include male gender and dyslipidemia.        Abel Aguilar is a 71 y.o. male.      The following portions of the patient's history were reviewed and updated as appropriate: allergies, current medications, past family history, past medical history, past social history, past surgical history and problem list.    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, left   • 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, right   • Essential hypertension, benign   • Disease of tongue   • Gastroesophageal reflux disease   • Right shoulder pain   • Tremor   • Abnormal electrocardiogram   • Acute chest pain   • Benign essential tremor   • Carpal tunnel syndrome, bilateral   • Hypertension   • Inguinal hernia   • Memory loss   • PVC (premature ventricular contraction)   • Screening for colon cancer       Past Medical History  Past Medical History:   Diagnosis Date   • Benign positional vertigo 6/26/2019   • Cancer (CMS/[facility])    • COPD (chronic obstructive pulmonary disease) (CMS/[facility]) 7/23/2018   • Disease of tongue 7/17/2020   • Essential hypertension, benign 7/9/2012   • Gastroesophageal reflux disease 7/17/2020   • Major depression, single episode 12/19/2019   • 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, right 1/3/2017   • 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 No pertinent family history.    Social History  Social History    Tobacco Use      Smoking status: Former Smoker        Packs/day: 0.50        Years: 5.00        Pack years: 2.5        Types: Cigarettes        Quit date:         Years since quittin.7      Smokeless tobacco: Never Used       Is the Patient a current tobacco user? No    Allergies  No Known Allergies    Current Medications    Current Outpatient Medications:   •  aspirin (aspirin) 81 MG EC tablet, Take 81 mg by mouth Daily., Disp: , Rfl:   •  Biotin (Biotin 5000) 5 MG capsule, Take  by mouth Daily., Disp: , Rfl:   •  cyclobenzaprine (FLEXERIL) 10 MG tablet, 1 tablet qhs and pm prn, Disp: 10 tablet, Rfl: 0  •  diclofenac (VOLTAREN) 1 % gel gel, Apply 4 g topically to the appropriate area as directed 4 (Four) Times a Day As Needed., Disp: , Rfl:   •  escitalopram (LEXAPRO) 10 MG tablet, Take 1 tablet by mouth Daily., Disp: 90 tablet, Rfl: 1  •  Misc Natural Products (OSTEO BI-FLEX TRIPLE STRENGTH PO), Take  by mouth., Disp: , Rfl:   •  Multiple Vitamins-Minerals (CENTRUM SILVER ADULT 50+ PO), Take  by mouth Daily., Disp: , Rfl:   •  Naproxen Sodium (Aleve) 220 MG capsule, Take 220 mg by mouth Daily As Needed., Disp: , Rfl:   •  Omega-3 Fatty Acids (FISH OIL) 1200 MG capsule capsule, Take  by mouth., Disp: , Rfl:   •  propranolol (INDERAL) 10 MG tablet, Take 1 tablet by mouth Daily., Disp: 90 tablet, Rfl: 2  •  pyridoxine (VITAMIN B-6) 100 MG tablet tablet, Take  by mouth Daily., Disp: , Rfl:   •  vitamin C (ASCORBIC ACID) 500 MG tablet, Take 500 mg by  mouth Daily., Disp: , Rfl:   •  VITAMIN D, CHOLECALCIFEROL, PO, Take  by mouth Daily., Disp: , Rfl:   •  Zinc 50 MG tablet, Take  by mouth Daily., Disp: , Rfl:      Review of System  Review of Systems   Eyes: Negative.    Respiratory: Negative.    Cardiovascular: Negative.    Endocrine: Negative.    Musculoskeletal: Negative.      I have reviewed and confirmed the accuracy of the ROS as documented by the MA/LPN/RN Tray Padron MD    Vitals:    08/31/21 1100   BP: 110/82   Resp: 16     Body mass index is 28.65 kg/m².    Objective     Physical Exam  Physical Exam  Cardiovascular:      Rate and Rhythm: Normal rate and regular rhythm.      Pulses: Normal pulses.      Heart sounds: Normal heart sounds.   Musculoskeletal:      Cervical back: Normal range of motion and neck supple.         Assessment/Plan      This 71-year-old patient presents today for follow-up of hypertension.  He relates that 2 days ago while cutting the grass.  He got cramps.  Since then he's had some achiness in his calves and in his quads.  His blood pressure on 7/2/21 was 126/80 in the left arm sitting position today it is 138/80.  He relates he's watching his sodium intake carefully.  Physical examination was unremarkable.        Diagnoses and all orders for this visit:    1. Essential hypertension (Primary)    2. Muscle cramps  Comments:  acute      Plan:  1.)  Flexeril 10 mg 1 tablet by mouth daily at bedtime when necessary for muscle cramps.    We'll see the patient in the next several months for Medicare wellness review.       Tray Padron MD  08/31/2021

## 2021-09-20 RX ORDER — CYCLOBENZAPRINE HCL 10 MG
TABLET ORAL
Qty: 10 TABLET | Refills: 0 | Status: SHIPPED | OUTPATIENT
Start: 2021-09-20 | End: 2021-11-30

## 2021-09-20 NOTE — TELEPHONE ENCOUNTER
Rx Refill Note  Requested Prescriptions     Pending Prescriptions Disp Refills   • cyclobenzaprine (FLEXERIL) 10 MG tablet [Pharmacy Med Name: CYCLOBENZAPRINE 10 MG TABLET] 10 tablet 0     Sig: TAKE ONE TABLET BY MOUTH EVERY EVENING/NIGHT AS NEEDED      Last office visit with prescribing clinician: 8/31/2021      Next office visit with prescribing clinician: 12/20/2021            Severino Robledo MA  09/20/21, 15:12 EDT

## 2021-09-23 RX ORDER — PROPRANOLOL HYDROCHLORIDE 10 MG/1
TABLET ORAL
Qty: 90 TABLET | Refills: 2 | Status: SHIPPED | OUTPATIENT
Start: 2021-09-23 | End: 2022-06-20

## 2021-11-18 DIAGNOSIS — F32.9 CURRENT EPISODE OF MAJOR DEPRESSIVE DISORDER WITHOUT PRIOR EPISODE, UNSPECIFIED DEPRESSION EPISODE SEVERITY: ICD-10-CM

## 2021-11-18 RX ORDER — ESCITALOPRAM OXALATE 10 MG/1
10 TABLET ORAL DAILY
Qty: 90 TABLET | Refills: 1 | Status: SHIPPED | OUTPATIENT
Start: 2021-11-18 | End: 2022-05-20

## 2021-11-30 ENCOUNTER — OFFICE VISIT (OUTPATIENT)
Dept: FAMILY MEDICINE CLINIC | Facility: CLINIC | Age: 71
End: 2021-11-30

## 2021-11-30 VITALS
BODY MASS INDEX: 28.61 KG/M2 | RESPIRATION RATE: 16 BRPM | SYSTOLIC BLOOD PRESSURE: 140 MMHG | WEIGHT: 193.2 LBS | HEIGHT: 69 IN | DIASTOLIC BLOOD PRESSURE: 90 MMHG

## 2021-11-30 DIAGNOSIS — M54.42 ACUTE LOW BACK PAIN WITH LEFT-SIDED SCIATICA, UNSPECIFIED BACK PAIN LATERALITY: ICD-10-CM

## 2021-11-30 DIAGNOSIS — I10 ESSENTIAL HYPERTENSION: Primary | ICD-10-CM

## 2021-11-30 DIAGNOSIS — M79.604 LEG PAIN, ANTERIOR, RIGHT: ICD-10-CM

## 2021-11-30 PROCEDURE — 99214 OFFICE O/P EST MOD 30 MIN: CPT | Performed by: INTERNAL MEDICINE

## 2021-11-30 RX ORDER — LISINOPRIL 5 MG/1
10 TABLET ORAL DAILY
Qty: 30 TABLET | Refills: 3 | Status: SHIPPED | OUTPATIENT
Start: 2021-11-30 | End: 2021-12-03 | Stop reason: SDUPTHER

## 2021-11-30 RX ORDER — NAPROXEN 375 MG/1
TABLET ORAL
Qty: 40 TABLET | Refills: 1 | Status: SHIPPED | OUTPATIENT
Start: 2021-11-30 | End: 2021-12-23

## 2021-11-30 RX ORDER — CYCLOBENZAPRINE HCL 10 MG
10 TABLET ORAL
Qty: 10 TABLET | Refills: 0 | Status: SHIPPED | OUTPATIENT
Start: 2021-11-30 | End: 2021-12-10

## 2021-11-30 NOTE — PROGRESS NOTES
11/30/2021    CC: Leg Pain (right leg.  has become constant over the last several weeks.)  .        HPI  Leg Pain   The incident occurred more than 1 week ago. The incident occurred at home. There was no injury mechanism. The pain is present in the right leg and right thigh. The pain is at a severity of 5/10. The pain has been intermittent since onset.        Abel Aguilar is a 71 y.o. male.      The following portions of the patient's history were reviewed and updated as appropriate: allergies, current medications, past family history, past medical history, past social history, past surgical history and problem list.    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, left   • 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, right   • Essential hypertension, benign   • Disease of tongue   • Gastroesophageal reflux disease   • Right shoulder pain   • Tremor   • Abnormal electrocardiogram   • Acute chest pain   • Benign essential tremor   • Carpal tunnel syndrome, bilateral   • Hypertension   • Inguinal hernia   • Memory loss   • PVC (premature ventricular contraction)   • Screening for colon cancer       Past Medical History  Past Medical History:   Diagnosis Date   • Benign positional vertigo 6/26/2019   • Cancer ([facility])    • COPD (chronic obstructive pulmonary disease) ([facility]) 7/23/2018   • Disease of tongue 7/17/2020   • Essential hypertension, benign 7/9/2012   • Gastroesophageal reflux disease 7/17/2020   • Major depression, single episode 12/19/2019   • 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, right 1/3/2017   • 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 No pertinent family history.    Social History  Social History    Tobacco Use      Smoking status: Former Smoker        Packs/day: 0.50        Years: 5.00        Pack years: 2.5        Types: Cigarettes        Quit date:         Years since quittin.9      Smokeless tobacco: Never Used       Is the Patient a current tobacco user? No    Allergies  No Known Allergies    Current Medications    Current Outpatient Medications:   •  aspirin (aspirin) 81 MG EC tablet, Take 81 mg by mouth Daily., Disp: , Rfl:   •  Biotin (Biotin 5000) 5 MG capsule, Take  by mouth Daily., Disp: , Rfl:   •  diclofenac (VOLTAREN) 1 % gel gel, Apply 4 g topically to the appropriate area as directed 4 (Four) Times a Day As Needed., Disp: , Rfl:   •  escitalopram (LEXAPRO) 10 MG tablet, Take 1 tablet by mouth Daily., Disp: 90 tablet, Rfl: 1  •  Misc Natural Products (OSTEO BI-FLEX TRIPLE STRENGTH PO), Take  by mouth., Disp: , Rfl:   •  Multiple Vitamins-Minerals (CENTRUM SILVER ADULT 50+ PO), Take  by mouth Daily., Disp: , Rfl:   •  Naproxen Sodium (Aleve) 220 MG capsule, Take 220 mg by mouth Daily As Needed., Disp: , Rfl:   •  Omega-3 Fatty Acids (FISH OIL) 1200 MG capsule capsule, Take  by mouth., Disp: , Rfl:   •  propranolol (INDERAL) 10 MG tablet, TAKE 1 TABLET BY MOUTH EVERY DAY, Disp: 90 tablet, Rfl: 2  •  pyridoxine (VITAMIN B-6) 100 MG tablet tablet, Take  by mouth Daily., Disp: , Rfl:   •  vitamin C (ASCORBIC ACID) 500 MG tablet, Take 500 mg by mouth Daily., Disp: , Rfl:   •  VITAMIN D, CHOLECALCIFEROL, PO, Take  by mouth Daily., Disp: , Rfl:    •  Zinc 50 MG tablet, Take  by mouth Daily., Disp: , Rfl:      Review of System  Review of Systems   Constitutional: Negative.    Respiratory: Negative.    Cardiovascular: Negative.    Gastrointestinal: Negative.    Endocrine: Negative.    Musculoskeletal: Positive for back pain and gait problem.     I have reviewed and confirmed the accuracy of the ROS as documented by the MA/LPN/RN Tray Padron MD    Vitals:    11/30/21 0909   BP: 140/90   Resp: 16     Body mass index is 28.53 kg/m².    Objective     Physical Exam  Physical Exam  Constitutional:       Appearance: Normal appearance. He is normal weight.   HENT:      Head: Normocephalic.   Cardiovascular:      Rate and Rhythm: Rhythm irregular.      Heart sounds: Normal heart sounds.   Pulmonary:      Effort: Pulmonary effort is normal.      Breath sounds: Normal breath sounds.   Neurological:      Mental Status: He is alert.         Assessment/Plan          This 71-year-old patient presents again for follow-up of right leg pain. He was seen by us about 3 months or so ago with a similar complaint. He was given a muscle relaxant and he relates that this did help initially but the discomfort returned after few weeks. He denies any recent trauma to the leg and denies any falls etc. The pain occurs along his right buttocks and moves down his side of his leg to his knee. Straight leg raise is positive for elicitation of pain. Pain is made worse when he bends and twist the back.    Patient is a retired but relates that he does notice when he bends down to  items the pain occurs.    Review of his labs from 4/21 showed that his comprehensive metabolic profile was within normal limits.    His blood pressure was borderline elevated today at 140/90 in the left arm sitting position standard cuff. He was previously on lisinopril but it demonstrated excellent blood pressure control and so the 5 mg he was on was discontinued several months ago. His weight is  essentially unchanged from the past.    I feel the patient has sciatica and will start him on the Flexeril that he was on before for for 5 days at 10 mg p.o. nightly and we will add Naprosyn 375 mg one tab p.o. twice daily with food. Also will restart his lisinopril at 5 mg for his hypertension. He is scheduled to see us again in December for follow-up.      Diagnoses and all orders for this visit:    1. Essential hypertension (Primary)    2. Leg pain, anterior, right    3. Acute low back pain with left-sided sciatica, unspecified back pain laterality        Plan:  1.) Lisinopril 5 mg one tab p.o. every morning.  2.) Naprosyn 375 mg one tab p.o. twice daily with food  3.) Flexeril 10 mg one tab p.o. nightly x1 week.  4.) Keep follow-up appointment. Tray Zuniga MD  11/30/2021

## 2021-12-03 DIAGNOSIS — I10 ESSENTIAL HYPERTENSION: ICD-10-CM

## 2021-12-03 RX ORDER — LISINOPRIL 5 MG/1
10 TABLET ORAL DAILY
Qty: 60 TABLET | Refills: 5 | Status: SHIPPED | OUTPATIENT
Start: 2021-12-03 | End: 2022-11-03

## 2021-12-20 ENCOUNTER — OFFICE VISIT (OUTPATIENT)
Dept: FAMILY MEDICINE CLINIC | Facility: CLINIC | Age: 71
End: 2021-12-20

## 2021-12-20 VITALS
HEIGHT: 69 IN | RESPIRATION RATE: 16 BRPM | DIASTOLIC BLOOD PRESSURE: 82 MMHG | SYSTOLIC BLOOD PRESSURE: 130 MMHG | BODY MASS INDEX: 29.12 KG/M2 | WEIGHT: 196.6 LBS

## 2021-12-20 DIAGNOSIS — I10 ESSENTIAL HYPERTENSION: Primary | Chronic | ICD-10-CM

## 2021-12-20 DIAGNOSIS — D64.9 ANEMIA, UNSPECIFIED TYPE: ICD-10-CM

## 2021-12-20 DIAGNOSIS — K21.9 GASTROESOPHAGEAL REFLUX DISEASE WITHOUT ESOPHAGITIS: Chronic | ICD-10-CM

## 2021-12-20 DIAGNOSIS — E78.5 HYPERLIPIDEMIA, UNSPECIFIED HYPERLIPIDEMIA TYPE: Chronic | ICD-10-CM

## 2021-12-20 PROCEDURE — G0439 PPPS, SUBSEQ VISIT: HCPCS | Performed by: INTERNAL MEDICINE

## 2021-12-20 PROCEDURE — 1170F FXNL STATUS ASSESSED: CPT | Performed by: INTERNAL MEDICINE

## 2021-12-20 PROCEDURE — 1159F MED LIST DOCD IN RCRD: CPT | Performed by: INTERNAL MEDICINE

## 2021-12-20 NOTE — PROGRESS NOTES
The ABCs of the Annual Wellness Visit  Subsequent Medicare Wellness Visit    Chief Complaint   Patient presents with   • Medicare Wellness-subsequent     ...no other issues      Subjective    History of Present Illness:  Saleem Aguilar is a 71 y.o. male who presents for a Subsequent Medicare Wellness Visit.    The following portions of the patient's history were reviewed and   updated as appropriate: allergies, current medications, past family history, past medical history, past social history, past surgical history and problem list.    Compared to one year ago, the patient feels his physical   health is worse.    Compared to one year ago, the patient feels his mental   health is the same.    Recent Hospitalizations:  He was not admitted to the hospital during the last year.       Current Medical Providers:  Patient Care Team:  Tray Padron MD as PCP - General (Internal Medicine)    Outpatient Medications Prior to Visit   Medication Sig Dispense Refill   • aspirin (aspirin) 81 MG EC tablet Take 81 mg by mouth Daily.     • Biotin (Biotin 5000) 5 MG capsule Take  by mouth Daily.     • escitalopram (LEXAPRO) 10 MG tablet Take 1 tablet by mouth Daily. 90 tablet 1   • lisinopril (PRINIVIL,ZESTRIL) 5 MG tablet Take 2 tablets by mouth Daily. (Patient taking differently: Take 2.5 mg by mouth Daily.) 60 tablet 5   • Misc Natural Products (OSTEO BI-FLEX TRIPLE STRENGTH PO) Take  by mouth.     • Multiple Vitamins-Minerals (CENTRUM SILVER ADULT 50+ PO) Take  by mouth Daily.     • naproxen (NAPROSYN) 375 MG tablet One tab p.o. twice daily with food 40 tablet 1   • Omega-3 Fatty Acids (FISH OIL) 1200 MG capsule capsule Take  by mouth.     • propranolol (INDERAL) 10 MG tablet TAKE 1 TABLET BY MOUTH EVERY DAY 90 tablet 2   • pyridoxine (VITAMIN B-6) 100 MG tablet tablet Take  by mouth Daily.     • vitamin C (ASCORBIC ACID) 500 MG tablet Take 500 mg by mouth Daily.     • VITAMIN D, CHOLECALCIFEROL, PO Take  by mouth Daily.     •  "Zinc 50 MG tablet Take  by mouth Daily.       No facility-administered medications prior to visit.       No opioid medication identified on active medication list. I have reviewed chart for other potential  high risk medication/s and harmful drug interactions in the elderly.          Aspirin is on active medication list. Aspirin use is indicated based on review of current medical condition/s. Pros and cons of this therapy have been discussed today. Benefits of this medication outweigh potential harm.  Patient has been encouraged to continue taking this medication.  .      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  ACP discussion was held with the patient during this visit. Patient has an advance directive in EMR which is still valid.     Review of Systems   Constitutional: Negative.    HENT: Negative.    Eyes: Negative.    Respiratory: Negative.    Cardiovascular: Negative.    Gastrointestinal: Negative.    Musculoskeletal: Negative.    Skin: Negative.    Psychiatric/Behavioral: Negative.          Objective    Vitals:    12/20/21 1017   BP: 130/82   Resp: 16   Weight: 89.2 kg (196 lb 9.6 oz)   Height: 175.3 cm (69\")     BMI Readings from Last 1 Encounters:   12/20/21 29.03 kg/m²   BMI is above normal parameters. Recommendations " include: educational material    Does the patient have evidence of cognitive impairment? No    Physical Exam  Vitals and nursing note reviewed.   Constitutional:       Appearance: He is well-developed.   HENT:      Head: Normocephalic and atraumatic.      Nose: Nose normal.   Eyes:      Conjunctiva/sclera: Conjunctivae normal.      Pupils: Pupils are equal, round, and reactive to light.   Cardiovascular:      Rate and Rhythm: Normal rate and regular rhythm.      Heart sounds: Normal heart sounds.   Pulmonary:      Effort: Pulmonary effort is normal.      Breath sounds: Normal breath sounds.   Abdominal:      General: Bowel sounds are normal.      Palpations: Abdomen is soft.   Musculoskeletal:         General: Normal range of motion.      Cervical back: Normal range of motion and neck supple.   Skin:     General: Skin is warm.   Neurological:      General: No focal deficit present.      Mental Status: He is alert and oriented to person, place, and time.   Psychiatric:         Behavior: Behavior normal.         Thought Content: Thought content normal.         Judgment: Judgment normal.                 HEALTH RISK ASSESSMENT    Smoking Status:  Social History     Tobacco Use   Smoking Status Former Smoker   • Packs/day: 0.50   • Years: 5.00   • Pack years: 2.50   • Types: Cigarettes   • Quit date:    • Years since quittin.0   Smokeless Tobacco Never Used     Alcohol Consumption:  Social History     Substance and Sexual Activity   Alcohol Use Never     Fall Risk Screen:    RUPERTO Fall Risk Assessment was completed, and patient is at HIGH risk for falls. Assessment completed on:2021    Depression Screening:  PHQ-2/PHQ-9 Depression Screening 2021   Little interest or pleasure in doing things 0   Feeling down, depressed, or hopeless 0   Total Score 0       Health Habits and Functional and Cognitive Screening:  Functional & Cognitive Status 2021   Do you have difficulty preparing food and eating?  No   Do you have difficulty bathing yourself, getting dressed or grooming yourself? No   Do you have difficulty using the toilet? No   Do you have difficulty moving around from place to place? Yes   Do you have trouble with steps or getting out of a bed or a chair? Yes   Current Diet -   Dental Exam Up to date   Eye Exam Up to date   Exercise (times per week) -   Current Exercise Activities Include -   Do you need help using the phone?  No   Are you deaf or do you have serious difficulty hearing?  No   Do you need help with transportation? Yes   Do you need help shopping? No   Do you need help preparing meals?  No   Do you need help with housework?  No   Do you need help with laundry? No   Do you need help taking your medications? No   Do you need help managing money? No   Do you ever drive or ride in a car without wearing a seat belt? No   Have you felt unusual stress, anger or loneliness in the last month? No   Who do you live with? Spouse   If you need help, do you have trouble finding someone available to you? No   Have you been bothered in the last four weeks by sexual problems? No   Do you have difficulty concentrating, remembering or making decisions? Yes       Age-appropriate Screening Schedule:  Refer to the list below for future screening recommendations based on patient's age, sex and/or medical conditions. Orders for these recommended tests are listed in the plan section. The patient has been provided with a written plan.    Health Maintenance   Topic Date Due   • ZOSTER VACCINE (1 of 2) Never done   • TDAP/TD VACCINES (2 - Td or Tdap) 04/06/2028   • INFLUENZA VACCINE  Completed              Assessment/Plan        This pleasant 71-year-old patient presents today for Medicare wellness review.    Regarding anticipatory guidance.  We discussed a goal of getting at least 30 minutes of exercise per day 5 days a week.          CMS Preventative Services Quick Reference  Risk Factors Identified During  Encounter  None Identified  The above risks/problems have been discussed with the patient.  Follow up actions/plans if indicated are seen below in the Assessment/Plan Section.  Pertinent information has been shared with the patient in the After Visit Summary.    There are no diagnoses linked to this encounter.    Follow Up:   No follow-ups on file.     An After Visit Summary and PPPS were made available to the patient.

## 2021-12-21 LAB
ALBUMIN SERPL-MCNC: 4.4 G/DL (ref 3.7–4.7)
ALBUMIN/GLOB SERPL: 1.3 {RATIO} (ref 1.2–2.2)
ALP SERPL-CCNC: 92 IU/L (ref 44–121)
ALT SERPL-CCNC: 19 IU/L (ref 0–44)
AST SERPL-CCNC: 19 IU/L (ref 0–40)
BASOPHILS # BLD AUTO: 0.1 X10E3/UL (ref 0–0.2)
BASOPHILS NFR BLD AUTO: 1 %
BILIRUB SERPL-MCNC: 0.4 MG/DL (ref 0–1.2)
BUN SERPL-MCNC: 18 MG/DL (ref 8–27)
BUN/CREAT SERPL: 19 (ref 10–24)
CALCIUM SERPL-MCNC: 10.4 MG/DL (ref 8.6–10.2)
CHLORIDE SERPL-SCNC: 103 MMOL/L (ref 96–106)
CHOLEST SERPL-MCNC: 155 MG/DL (ref 100–199)
CHOLEST/HDLC SERPL: 2.8 RATIO (ref 0–5)
CO2 SERPL-SCNC: 26 MMOL/L (ref 20–29)
CREAT SERPL-MCNC: 0.95 MG/DL (ref 0.76–1.27)
EOSINOPHIL # BLD AUTO: 0.2 X10E3/UL (ref 0–0.4)
EOSINOPHIL NFR BLD AUTO: 4 %
ERYTHROCYTE [DISTWIDTH] IN BLOOD BY AUTOMATED COUNT: 13 % (ref 11.6–15.4)
GLOBULIN SER CALC-MCNC: 3.3 G/DL (ref 1.5–4.5)
GLUCOSE SERPL-MCNC: 90 MG/DL (ref 65–99)
HCT VFR BLD AUTO: 43.3 % (ref 37.5–51)
HDLC SERPL-MCNC: 56 MG/DL
HGB BLD-MCNC: 14.2 G/DL (ref 13–17.7)
IMM GRANULOCYTES # BLD AUTO: 0 X10E3/UL (ref 0–0.1)
IMM GRANULOCYTES NFR BLD AUTO: 0 %
LDLC SERPL CALC-MCNC: 85 MG/DL (ref 0–99)
LYMPHOCYTES # BLD AUTO: 1.7 X10E3/UL (ref 0.7–3.1)
LYMPHOCYTES NFR BLD AUTO: 42 %
MCH RBC QN AUTO: 28.6 PG (ref 26.6–33)
MCHC RBC AUTO-ENTMCNC: 32.8 G/DL (ref 31.5–35.7)
MCV RBC AUTO: 87 FL (ref 79–97)
MONOCYTES # BLD AUTO: 0.4 X10E3/UL (ref 0.1–0.9)
MONOCYTES NFR BLD AUTO: 8 %
NEUTROPHILS # BLD AUTO: 1.8 X10E3/UL (ref 1.4–7)
NEUTROPHILS NFR BLD AUTO: 45 %
PLATELET # BLD AUTO: 310 X10E3/UL (ref 150–450)
POTASSIUM SERPL-SCNC: 5.2 MMOL/L (ref 3.5–5.2)
PROT SERPL-MCNC: 7.7 G/DL (ref 6–8.5)
RBC # BLD AUTO: 4.97 X10E6/UL (ref 4.14–5.8)
SODIUM SERPL-SCNC: 149 MMOL/L (ref 134–144)
TRIGL SERPL-MCNC: 70 MG/DL (ref 0–149)
VLDLC SERPL CALC-MCNC: 14 MG/DL (ref 5–40)
WBC # BLD AUTO: 4.2 X10E3/UL (ref 3.4–10.8)

## 2021-12-23 DIAGNOSIS — M54.42 ACUTE LOW BACK PAIN WITH LEFT-SIDED SCIATICA, UNSPECIFIED BACK PAIN LATERALITY: ICD-10-CM

## 2021-12-23 RX ORDER — NAPROXEN 375 MG/1
TABLET ORAL
Qty: 60 TABLET | Refills: 1 | Status: SHIPPED | OUTPATIENT
Start: 2021-12-23 | End: 2022-02-21

## 2022-01-05 ENCOUNTER — OFFICE VISIT (OUTPATIENT)
Dept: FAMILY MEDICINE CLINIC | Facility: CLINIC | Age: 72
End: 2022-01-05

## 2022-01-05 VITALS
DIASTOLIC BLOOD PRESSURE: 86 MMHG | HEIGHT: 69 IN | SYSTOLIC BLOOD PRESSURE: 120 MMHG | RESPIRATION RATE: 16 BRPM | WEIGHT: 196 LBS | BODY MASS INDEX: 29.03 KG/M2

## 2022-01-05 DIAGNOSIS — M54.42 ACUTE LOW BACK PAIN WITH LEFT-SIDED SCIATICA, UNSPECIFIED BACK PAIN LATERALITY: Primary | ICD-10-CM

## 2022-01-05 DIAGNOSIS — M54.41 CHRONIC RIGHT-SIDED LOW BACK PAIN WITH RIGHT-SIDED SCIATICA: ICD-10-CM

## 2022-01-05 DIAGNOSIS — G89.29 CHRONIC RIGHT-SIDED LOW BACK PAIN WITH RIGHT-SIDED SCIATICA: ICD-10-CM

## 2022-01-05 PROCEDURE — 99214 OFFICE O/P EST MOD 30 MIN: CPT | Performed by: INTERNAL MEDICINE

## 2022-01-05 RX ORDER — TRAMADOL HYDROCHLORIDE 50 MG/1
50 TABLET ORAL NIGHTLY
Qty: 7 TABLET | Refills: 0 | Status: SHIPPED | OUTPATIENT
Start: 2022-01-05 | End: 2022-01-26

## 2022-01-05 NOTE — PROGRESS NOTES
01/05/2022    CC: Back Pain (f/u.  radiating down right leg.)  .        HPI  Back Pain  This is a chronic problem. The current episode started more than 1 month ago. The problem occurs constantly. The problem is unchanged. The pain is present in the gluteal and lumbar spine. The quality of the pain is described as aching and burning. The pain radiates to the right thigh. The pain is at a severity of 7/10. The symptoms are aggravated by bending and position. Stiffness is present all day. Associated symptoms include numbness. Risk factors include sedentary lifestyle.        Abel Aguilar is a 71 y.o. male.      The following portions of the patient's history were reviewed and updated as appropriate: allergies, current medications, past family history, past medical history, past social history, past surgical history and problem list.    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, left   • 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, right   • Essential hypertension, benign   • Disease of tongue   • Gastroesophageal reflux disease   • Right shoulder pain   • Tremor   • Abnormal electrocardiogram   • Acute chest pain   • Benign essential tremor   • Carpal tunnel syndrome, bilateral   • Hypertension   • Inguinal hernia   • Memory loss   • PVC (premature ventricular contraction)   • Screening for colon cancer       Past Medical History  Past Medical History:   Diagnosis Date   • Benign positional vertigo 6/26/2019   • Cancer ([facility])    • COPD (chronic obstructive pulmonary disease) ([facility]) 7/23/2018   • Disease of tongue 7/17/2020   • Essential hypertension, benign 7/9/2012   • Gastroesophageal reflux disease 7/17/2020   • Major depression, single episode 12/19/2019   • Median nerve lesion at upper  arm, right 1/3/2017   • 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 No pertinent family history.    Social History  Social History    Tobacco Use      Smoking status: Former Smoker        Packs/day: 0.50        Years: 5.00        Pack years: 2.5        Types: Cigarettes        Quit date:         Years since quittin.0      Smokeless tobacco: Never Used       Is the Patient a current tobacco user? No    Allergies  No Known Allergies    Current Medications    Current Outpatient Medications:   •  aspirin (aspirin) 81 MG EC tablet, Take 81 mg by mouth Daily., Disp: , Rfl:   •  Biotin (Biotin 5000) 5 MG capsule, Take  by mouth Daily., Disp: , Rfl:   •  escitalopram (LEXAPRO) 10 MG tablet, Take 1 tablet by mouth Daily., Disp: 90 tablet, Rfl: 1  •  lisinopril (PRINIVIL,ZESTRIL) 5 MG tablet, Take 2 tablets by mouth Daily. (Patient taking differently: Take 2.5 mg by mouth Daily.), Disp: 60 tablet, Rfl: 5  •  Misc Natural Products (OSTEO BI-FLEX TRIPLE STRENGTH PO), Take  by mouth., Disp: , Rfl:   •  Multiple Vitamins-Minerals (CENTRUM SILVER ADULT 50+ PO), Take  by mouth Daily., Disp: , Rfl:   •  naproxen (NAPROSYN) 375 MG tablet, TAKE 1 TABLET BY MOUTH TWICE A DAY WITH FOOD, Disp: 60 tablet, Rfl: 1  •  Omega-3 Fatty Acids (FISH OIL) 1200 MG capsule capsule, Take  by mouth., Disp: , Rfl:   •  propranolol (INDERAL) 10 MG tablet, TAKE 1 TABLET BY MOUTH EVERY DAY, Disp: 90 tablet, Rfl: 2  •   pyridoxine (VITAMIN B-6) 100 MG tablet tablet, Take  by mouth Daily., Disp: , Rfl:   •  vitamin C (ASCORBIC ACID) 500 MG tablet, Take 500 mg by mouth Daily., Disp: , Rfl:   •  VITAMIN D, CHOLECALCIFEROL, PO, Take  by mouth Daily., Disp: , Rfl:   •  Zinc 50 MG tablet, Take  by mouth Daily., Disp: , Rfl:   •  traMADol (ULTRAM) 50 MG tablet, Take 1 tablet by mouth Every Night., Disp: 7 tablet, Rfl: 0     Review of System  Review of Systems   Constitutional: Negative.    Respiratory: Negative.    Cardiovascular: Negative.    Gastrointestinal: Negative.    Musculoskeletal: Positive for back pain.   Neurological: Positive for numbness.   Psychiatric/Behavioral: Negative.      I have reviewed and confirmed the accuracy of the ROS as documented by the MA/LPN/RN Tray Padron MD    Vitals:    01/05/22 1126   BP: 120/86   Resp: 16     Body mass index is 28.94 kg/m².    Objective     Physical Exam  Physical Exam  Cardiovascular:      Rate and Rhythm: Normal rate and regular rhythm.      Pulses: Normal pulses.      Heart sounds: Normal heart sounds.   Pulmonary:      Effort: Pulmonary effort is normal.      Breath sounds: Normal breath sounds.   Abdominal:      General: Abdomen is flat.   Musculoskeletal:      Cervical back: Normal range of motion and neck supple.      Comments: Straight leg raise positive on the right   Neurological:      General: No focal deficit present.      Mental Status: He is oriented to person, place, and time. Mental status is at baseline.   Psychiatric:         Mood and Affect: Mood normal.         Assessment/Plan        This 71-year-old patient presents today for follow-up of continued stinging and achiness in the right lower extremity.  This been present for a little greater than 6 weeks.  He was given initially Flexeril and Naprosyn but this did not prove helpful with alleviating the pain.  He relates that he has had some low-grade back aching several weeks ago but the overlying problem now is  more in the numbness and tingling in his right lower leg along the lateral aspect.  Patient relates he has been doing more lifting and bending lately but there has been no trauma to his lower back or legs.    Straight leg raises were positive for elicitation of pain on the right.    Diagnoses and all orders for this visit:    1. Acute low back pain with left-sided sciatica, unspecified back pain laterality (Primary)  -     traMADol (ULTRAM) 50 MG tablet; Take 1 tablet by mouth Every Night.  Dispense: 7 tablet; Refill: 0    2. Chronic right-sided low back pain with right-sided sciatica  -     MRI Lumbar Spine Without Contrast; Future      Plan:  1.)  Tramadol 50 mg 1 tab p.o. nightly to start for pain in his right lower extremity  2.)  MRI of the lumbar sacral spine to evaluate for spinal stenosis versus HNP.  3.)  Follow-up in the next few weeks.       Tray Padron MD  01/05/2022

## 2022-01-12 ENCOUNTER — HOSPITAL ENCOUNTER (OUTPATIENT)
Dept: MRI IMAGING | Facility: HOSPITAL | Age: 72
Discharge: HOME | End: 2022-01-12
Admitting: INTERNAL MEDICINE

## 2022-01-12 DIAGNOSIS — M48.061 SPINAL STENOSIS AT L4-L5 LEVEL: Primary | ICD-10-CM

## 2022-01-12 DIAGNOSIS — M54.41 CHRONIC RIGHT-SIDED LOW BACK PAIN WITH RIGHT-SIDED SCIATICA: ICD-10-CM

## 2022-01-12 DIAGNOSIS — G89.29 CHRONIC RIGHT-SIDED LOW BACK PAIN WITH RIGHT-SIDED SCIATICA: ICD-10-CM

## 2022-01-12 PROCEDURE — 0 GADOBENATE DIMEGLUMINE 529 MG/ML SOLUTION: Performed by: INTERNAL MEDICINE

## 2022-01-12 PROCEDURE — A9577 INJ MULTIHANCE: HCPCS | Performed by: INTERNAL MEDICINE

## 2022-01-12 PROCEDURE — 72158 MRI LUMBAR SPINE W/O & W/DYE: CPT

## 2022-01-12 RX ADMIN — GADOBENATE DIMEGLUMINE 18 ML: 529 INJECTION, SOLUTION INTRAVENOUS at 13:08

## 2022-01-26 ENCOUNTER — OFFICE VISIT (OUTPATIENT)
Dept: FAMILY MEDICINE CLINIC | Facility: CLINIC | Age: 72
End: 2022-01-26

## 2022-01-26 VITALS
SYSTOLIC BLOOD PRESSURE: 120 MMHG | HEIGHT: 69 IN | BODY MASS INDEX: 29.03 KG/M2 | WEIGHT: 196 LBS | RESPIRATION RATE: 16 BRPM | DIASTOLIC BLOOD PRESSURE: 82 MMHG

## 2022-01-26 DIAGNOSIS — L91.8 SKIN TAG: ICD-10-CM

## 2022-01-26 DIAGNOSIS — L30.9 ECZEMA, UNSPECIFIED TYPE: Chronic | ICD-10-CM

## 2022-01-26 DIAGNOSIS — M54.42 ACUTE LOW BACK PAIN WITH LEFT-SIDED SCIATICA, UNSPECIFIED BACK PAIN LATERALITY: Primary | Chronic | ICD-10-CM

## 2022-01-26 PROCEDURE — 99213 OFFICE O/P EST LOW 20 MIN: CPT | Performed by: INTERNAL MEDICINE

## 2022-01-30 NOTE — PROGRESS NOTES
01/26/2022    CC: Back Pain (f/u.  discuss MRI), Eczema (behind left ear), and Mass (right clavicle)  .        HPI  Back Pain  This is a chronic problem. The current episode started 1 to 4 weeks ago. The problem occurs intermittently. The problem has been waxing and waning since onset. The pain is present in the lumbar spine. The quality of the pain is described as aching. The pain does not radiate.   Eczema         Abel Aguilar is a 71 y.o. male.      The following portions of the patient's history were reviewed and updated as appropriate: allergies, current medications, past family history, past medical history, past social history, past surgical history and problem list.    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, left   • 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, right   • Essential hypertension, benign   • Disease of tongue   • Gastroesophageal reflux disease   • Right shoulder pain   • Tremor   • Abnormal electrocardiogram   • Acute chest pain   • Benign essential tremor   • Carpal tunnel syndrome, bilateral   • Hypertension   • Inguinal hernia   • Memory loss   • PVC (premature ventricular contraction)   • Screening for colon cancer       Past Medical History  Past Medical History:   Diagnosis Date   • Benign positional vertigo 6/26/2019   • Cancer ([facility])    • COPD (chronic obstructive pulmonary disease) ([facility]) 7/23/2018   • Disease of tongue 7/17/2020   • Essential hypertension, benign 7/9/2012   • Gastroesophageal reflux disease 7/17/2020   • Major depression, single episode 12/19/2019   • 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, right 1/3/2017   • 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 ENDOSCOPY;  Service: General;  Laterality: N/A;  SCREENING  post-- normal   • HERNIA REPAIR     • MENISCECTOMY Right    • PROSTATECTOMY         Family History  History reviewed. No pertinent family history.    Social History  Social History    Tobacco Use      Smoking status: Former Smoker        Packs/day: 0.50        Years: 5.00        Pack years: 2.5        Types: Cigarettes        Quit date:         Years since quittin.1      Smokeless tobacco: Never Used       Is the Patient a current tobacco user? No    Allergies  No Known Allergies    Current Medications    Current Outpatient Medications:   •  aspirin (aspirin) 81 MG EC tablet, Take 81 mg by mouth Daily., Disp: , Rfl:   •  Biotin (Biotin 5000) 5 MG capsule, Take  by mouth Daily., Disp: , Rfl:   •  escitalopram (LEXAPRO) 10 MG tablet, Take 1 tablet by mouth Daily., Disp: 90 tablet, Rfl: 1  •  lisinopril (PRINIVIL,ZESTRIL) 5 MG tablet, Take 2 tablets by mouth Daily. (Patient taking differently: Take 2.5 mg by mouth Daily.), Disp: 60 tablet, Rfl: 5  •  Misc Natural Products (OSTEO BI-FLEX TRIPLE STRENGTH PO), Take  by mouth., Disp: , Rfl:   •  Multiple Vitamins-Minerals (CENTRUM SILVER ADULT 50+ PO), Take  by mouth Daily., Disp: , Rfl:   •  naproxen (NAPROSYN) 375 MG tablet, TAKE 1 TABLET BY MOUTH TWICE A DAY WITH FOOD, Disp: 60 tablet, Rfl: 1  •  Omega-3 Fatty Acids (FISH OIL) 1200 MG capsule capsule, Take  by mouth., Disp: , Rfl:   •  propranolol (INDERAL) 10 MG tablet, TAKE 1 TABLET BY MOUTH EVERY DAY, Disp: 90 tablet, Rfl: 2  •  pyridoxine (VITAMIN B-6) 100 MG tablet tablet, Take  by mouth Daily., Disp: , Rfl:   •  vitamin C (ASCORBIC ACID) 500 MG tablet, Take 500 mg by mouth Daily., Disp: ,  Rfl:   •  VITAMIN D, CHOLECALCIFEROL, PO, Take  by mouth Daily., Disp: , Rfl:   •  Zinc 50 MG tablet, Take  by mouth Daily., Disp: , Rfl:      Review of System  Review of Systems   Constitutional: Negative.    Eyes: Negative.    Respiratory: Negative.    Cardiovascular: Negative.    Musculoskeletal: Positive for back pain.        Straight leg raise is negative bilaterally     I have reviewed and confirmed the accuracy of the ROS as documented by the MA/LPN/RN Tray Padron MD    Vitals:    01/26/22 1001   BP: 120/82   Resp: 16     Body mass index is 28.94 kg/m².    Objective     Physical Exam  Physical Exam  HENT:      Head: Normocephalic.   Neck:      Comments: The patient's question of regarding mass in the right clavicle was the right carotid which is not tender with distraction.  Cardiovascular:      Rate and Rhythm: Normal rate and regular rhythm.      Pulses: Normal pulses.      Heart sounds: Normal heart sounds.   Pulmonary:      Effort: Pulmonary effort is normal.      Breath sounds: Normal breath sounds.   Musculoskeletal:      Cervical back: Normal range of motion.   Neurological:      Mental Status: He is alert.         Assessment/Plan    I reviewed with the patient his MRI results from 1/5/2022 showing mild lumbar degenerative disease including an element of canal stenosis most severe at L4-5.  He relates that the pain has gone with use of the tramadol.    I made a referral to neurosurgery for evaluation of the patient's stenosis and intermittent pain.  We will follow up with the patient in the next several weeks.  He is to call if his pain recurs.          Diagnoses and all orders for this visit:    1. Acute low back pain with left-sided sciatica, unspecified back pain laterality (Primary)  Comments:  Spinal cord stenosis at L4-5    2. Eczema, unspecified type  Comments:  Unchanged    3. Skin tag  Comments:  Unchanged      Plan:  1.)  Continue tramadol as needed for back pain  2.)  Follow-up in 3 to  4 months.       Tray Padron MD  01/26/2022

## 2022-02-20 DIAGNOSIS — M54.42 ACUTE LOW BACK PAIN WITH LEFT-SIDED SCIATICA, UNSPECIFIED BACK PAIN LATERALITY: ICD-10-CM

## 2022-02-21 RX ORDER — NAPROXEN 375 MG/1
TABLET ORAL
Qty: 60 TABLET | Refills: 1 | Status: SHIPPED | OUTPATIENT
Start: 2022-02-21 | End: 2022-04-21

## 2022-04-21 DIAGNOSIS — M54.42 ACUTE LOW BACK PAIN WITH LEFT-SIDED SCIATICA, UNSPECIFIED BACK PAIN LATERALITY: ICD-10-CM

## 2022-04-21 RX ORDER — NAPROXEN 375 MG/1
TABLET ORAL
Qty: 60 TABLET | Refills: 1 | Status: SHIPPED | OUTPATIENT
Start: 2022-04-21 | End: 2022-06-27

## 2022-04-21 NOTE — TELEPHONE ENCOUNTER
Rx Refill Note  Requested Prescriptions     Pending Prescriptions Disp Refills   • naproxen (NAPROSYN) 375 MG tablet [Pharmacy Med Name: NAPROXEN 375 MG TABLET] 60 tablet 1     Sig: TAKE 1 TABLET BY MOUTH TWICE A DAY WITH FOOD      Last office visit with prescribing clinician: 1/26/2022      Next office visit with prescribing clinician: 6/24/2022            Severino Robledo MA  04/21/22, 07:08 EDT

## 2022-05-20 DIAGNOSIS — F32.9 CURRENT EPISODE OF MAJOR DEPRESSIVE DISORDER WITHOUT PRIOR EPISODE, UNSPECIFIED DEPRESSION EPISODE SEVERITY: ICD-10-CM

## 2022-05-20 RX ORDER — ESCITALOPRAM OXALATE 10 MG/1
TABLET ORAL
Qty: 90 TABLET | Refills: 1 | Status: SHIPPED | OUTPATIENT
Start: 2022-05-20 | End: 2022-11-08

## 2022-06-20 RX ORDER — PROPRANOLOL HYDROCHLORIDE 10 MG/1
TABLET ORAL
Qty: 90 TABLET | Refills: 2 | Status: SHIPPED | OUTPATIENT
Start: 2022-06-20

## 2022-06-27 DIAGNOSIS — M54.42 ACUTE LOW BACK PAIN WITH LEFT-SIDED SCIATICA, UNSPECIFIED BACK PAIN LATERALITY: ICD-10-CM

## 2022-06-27 RX ORDER — NAPROXEN 375 MG/1
TABLET ORAL
Qty: 60 TABLET | Refills: 0 | Status: SHIPPED | OUTPATIENT
Start: 2022-06-27 | End: 2022-07-26

## 2022-07-11 ENCOUNTER — OFFICE VISIT (OUTPATIENT)
Dept: FAMILY MEDICINE CLINIC | Facility: CLINIC | Age: 72
End: 2022-07-11

## 2022-07-11 VITALS
WEIGHT: 194.2 LBS | HEIGHT: 69 IN | SYSTOLIC BLOOD PRESSURE: 120 MMHG | RESPIRATION RATE: 16 BRPM | DIASTOLIC BLOOD PRESSURE: 78 MMHG | BODY MASS INDEX: 28.76 KG/M2

## 2022-07-11 DIAGNOSIS — S83.281A TEAR OF LATERAL MENISCUS OF RIGHT KNEE, UNSPECIFIED TEAR TYPE, UNSPECIFIED WHETHER OLD OR CURRENT TEAR, INITIAL ENCOUNTER: ICD-10-CM

## 2022-07-11 DIAGNOSIS — S89.82XA: Primary | ICD-10-CM

## 2022-07-11 PROCEDURE — 99213 OFFICE O/P EST LOW 20 MIN: CPT | Performed by: INTERNAL MEDICINE

## 2022-07-12 ENCOUNTER — TELEPHONE (OUTPATIENT)
Dept: FAMILY MEDICINE CLINIC | Facility: CLINIC | Age: 72
End: 2022-07-12

## 2022-07-12 NOTE — TELEPHONE ENCOUNTER
Hub staff attempted to follow warm transfer process and was unsuccessful     Caller: Saleem Aguilar    Relationship to patient: Self    Best call back number: [phone]    Patient is needing: PATIENT WAS RETURNING CALL TO RESCHEDULE HIS BOOSTER SHOT TODAY.

## 2022-07-20 ENCOUNTER — IMMUNIZATION (OUTPATIENT)
Dept: FAMILY MEDICINE CLINIC | Facility: CLINIC | Age: 72
End: 2022-07-20

## 2022-07-20 DIAGNOSIS — Z23 NEED FOR COVID-19 VACCINE: Primary | ICD-10-CM

## 2022-07-20 PROCEDURE — 91305 COVID-19 (PFIZER) 12+ YRS: CPT | Performed by: INTERNAL MEDICINE

## 2022-07-20 PROCEDURE — 0054A COVID-19 (PFIZER) 12+ YRS: CPT | Performed by: INTERNAL MEDICINE

## 2022-07-25 NOTE — PROGRESS NOTES
07/11/2022    CC: Hypertension (F/U), Blister (Bottom of both feet.), and Back Pain (Low back pain.  Ongoing issue)  .        HPI  This 72-year-old patient presents with 3 distinct problems.  #1 he is here for follow-up of hypertension, #2 for evaluation of blisters on the bottom of his feet  3.)  And low-grade back pain off and on present times several weeks unchanged from the past.       Subjective   Saleem Aguilar is a 72 y.o. male.      The following portions of the patient's history were reviewed and updated as appropriate: allergies, current medications, past family history, past medical history, past social history, past surgical history and problem list.    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, left   • 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, right   • Essential hypertension, benign   • Disease of tongue   • Gastroesophageal reflux disease   • Right shoulder pain   • Tremor   • Abnormal electrocardiogram   • Acute chest pain   • Benign essential tremor   • Carpal tunnel syndrome, bilateral   • Hypertension   • Inguinal hernia   • Memory loss   • PVC (premature ventricular contraction)   • Screening for colon cancer       Past Medical History  Past Medical History:   Diagnosis Date   • Benign positional vertigo 6/26/2019   • Cancer (H[facility])    • COPD (chronic obstructive pulmonary disease) ([facility]) 7/23/2018   • Disease of tongue 7/17/2020   • Essential hypertension, benign 7/9/2012   • Gastroesophageal reflux disease 7/17/2020   • Major depression, single episode 12/19/2019   • 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, right 1/3/2017   • 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 No pertinent family history.    Social History  Social History    Tobacco Use      Smoking status: Former Smoker        Packs/day: 0.50        Years: 5.00        Pack years: 2.5        Types: Cigarettes        Quit date:         Years since quittin.5      Smokeless tobacco: Never Used       Is the Patient a current tobacco user? No    Allergies  No Known Allergies    Current Medications    Current Outpatient Medications:   •  aspirin 81 MG EC tablet, Take 81 mg by mouth Daily., Disp: , Rfl:   •  Biotin 5 MG capsule, Take  by mouth Daily., Disp: , Rfl:   •  escitalopram (LEXAPRO) 10 MG tablet, TAKE 1 TABLET BY MOUTH EVERY DAY, Disp: 90 tablet, Rfl: 1  •  lisinopril (PRINIVIL,ZESTRIL) 5 MG tablet, Take 2 tablets by mouth Daily. (Patient taking differently: Take 5 mg by mouth Daily.), Disp: 60 tablet, Rfl: 5  •  Misc Natural Products (OSTEO BI-FLEX TRIPLE STRENGTH PO), Take  by mouth., Disp: , Rfl:   •  Multiple Vitamins-Minerals (CENTRUM SILVER ADULT 50+ PO), Take  by mouth Daily., Disp: , Rfl:   •  naproxen (NAPROSYN) 375 MG tablet, TAKE 1 TABLET BY MOUTH TWICE A DAY WITH FOOD, Disp: 60 tablet, Rfl: 0  •  Omega-3 Fatty Acids (FISH OIL) 1200 MG capsule capsule, Take  by mouth., Disp: , Rfl:   •  propranolol (INDERAL) 10 MG tablet, TAKE 1 TABLET BY MOUTH EVERY DAY, Disp: 90 tablet, Rfl: 2  •  pyridoxine (VITAMIN B-6) 100 MG tablet tablet, Take  by mouth Daily., Disp: , Rfl:   •  vitamin C (ASCORBIC ACID) 500 MG tablet, Take 500 mg by mouth Daily., Disp: , Rfl:   •  VITAMIN D, CHOLECALCIFEROL, PO, Take  by  mouth Daily., Disp: , Rfl:   •  Zinc 50 MG tablet, Take  by mouth Daily., Disp: , Rfl:      Review of System  Review of Systems   Eyes: Negative.    Respiratory: Negative.    Cardiovascular: Negative.    Gastrointestinal: Negative.    Endocrine: Negative.    Genitourinary: Negative.      I have reviewed and confirmed the accuracy of the ROS as documented by the MA/LPN/RN Tray Padron MD    Vitals:    07/11/22 1506   BP: 120/78   Resp: 16     Body mass index is 28.68 kg/m².    Objective     Physical Exam  Physical Exam  HENT:      Head: Normocephalic and atraumatic.   Eyes:      Pupils: Pupils are equal, round, and reactive to light.   Cardiovascular:      Rate and Rhythm: Normal rate and regular rhythm.   Musculoskeletal:         General: Normal range of motion.   Neurological:      General: No focal deficit present.      Mental Status: He is oriented to person, place, and time.         Assessment & Plan      This 72-year-old patient relates that he continues to have crackling in his knees he has no effusion no pain no discomfort but is concerned about the crackling.  We have discussed with him that this likely represents meniscal tear but we note that he is not having appreciable pain swelling or changes in function of the knee however as it continues to be of some concern we will make a referral to orthopedic surgery for further evaluation.    Patient complains of  There are no diagnoses linked to this encounter.     Plan:  1.)  Right knee discomfort  2.)  Left foot lesions    Tray Padron MD  07/11/2022

## 2022-07-26 DIAGNOSIS — M54.42 ACUTE LOW BACK PAIN WITH LEFT-SIDED SCIATICA, UNSPECIFIED BACK PAIN LATERALITY: ICD-10-CM

## 2022-07-26 RX ORDER — NAPROXEN 375 MG/1
TABLET ORAL
Qty: 60 TABLET | Refills: 0 | Status: SHIPPED | OUTPATIENT
Start: 2022-07-26 | End: 2022-09-06

## 2022-08-01 ENCOUNTER — OFFICE VISIT (OUTPATIENT)
Dept: ORTHOPEDIC SURGERY | Facility: CLINIC | Age: 72
End: 2022-08-01

## 2022-08-01 VITALS — BODY MASS INDEX: 28.72 KG/M2 | WEIGHT: 193.9 LBS | HEIGHT: 69 IN | TEMPERATURE: 97.3 F

## 2022-08-01 DIAGNOSIS — R52 PAIN: Primary | ICD-10-CM

## 2022-08-01 DIAGNOSIS — M17.11 ARTHRITIS OF RIGHT KNEE: ICD-10-CM

## 2022-08-01 PROCEDURE — 20610 DRAIN/INJ JOINT/BURSA W/O US: CPT | Performed by: ORTHOPAEDIC SURGERY

## 2022-08-01 PROCEDURE — 99204 OFFICE O/P NEW MOD 45 MIN: CPT | Performed by: ORTHOPAEDIC SURGERY

## 2022-08-01 PROCEDURE — 73562 X-RAY EXAM OF KNEE 3: CPT | Performed by: ORTHOPAEDIC SURGERY

## 2022-08-01 RX ORDER — LIDOCAINE HYDROCHLORIDE 10 MG/ML
4 INJECTION, SOLUTION EPIDURAL; INFILTRATION; INTRACAUDAL; PERINEURAL
Status: COMPLETED | OUTPATIENT
Start: 2022-08-01 | End: 2022-08-01

## 2022-08-01 RX ORDER — METHYLPREDNISOLONE ACETATE 80 MG/ML
80 INJECTION, SUSPENSION INTRA-ARTICULAR; INTRALESIONAL; INTRAMUSCULAR; SOFT TISSUE
Status: COMPLETED | OUTPATIENT
Start: 2022-08-01 | End: 2022-08-01

## 2022-08-01 RX ADMIN — METHYLPREDNISOLONE ACETATE 80 MG: 80 INJECTION, SUSPENSION INTRA-ARTICULAR; INTRALESIONAL; INTRAMUSCULAR; SOFT TISSUE at 11:37

## 2022-08-01 RX ADMIN — LIDOCAINE HYDROCHLORIDE 4 ML: 10 INJECTION, SOLUTION EPIDURAL; INFILTRATION; INTRACAUDAL; PERINEURAL at 11:37

## 2022-08-01 NOTE — PROGRESS NOTES
Large Joint Arthrocentesis: R knee  Date/Time: 8/1/2022 11:37 AM  Consent given by: patient  Site marked: site marked  Timeout: Immediately prior to procedure a time out was called to verify the correct patient, procedure, equipment, support staff and site/side marked as required   Supporting Documentation  Indications: pain, joint swelling and diagnostic evaluation   Procedure Details  Location: knee - R knee  Preparation: Patient was prepped and draped in the usual sterile fashion  Needle gauge: 21G.  Medications administered: 80 mg methylPREDNISolone acetate 80 MG/ML; 4 mL lidocaine PF 1% 1 %  Patient tolerance: patient tolerated the procedure well with no immediate complications

## 2022-08-01 NOTE — PROGRESS NOTES
Patient Name: Saleem Aguilar   YOB: 1950  Referring Primary Care Physician: Tray Padron MD  BMI: Body mass index is 28.63 kg/m².    Chief Complaint:    Chief Complaint   Patient presents with   • Right Knee - Initial Evaluation, Pain        HPI:     Saleem Aguilar is a 72 y.o. male who presents today for evaluation of   Chief Complaint   Patient presents with   • Right Knee - Initial Evaluation, Pain   .  Patient is seen today complaining of chronic right knee pain.  Says he does not really have any injury to it.  It stiff its achy is bothering him.  He is retired  from [employer] and said he had meniscal surgery a few years ago by Dr. Cheikh johnson in the same knee.  He is not tried physical therapy has not had injections achy and stiff and bothers him when he walks his this is exercise he likes to do      Subjective   Medications:   Home Medications:  Current Outpatient Medications on File Prior to Visit   Medication Sig   • aspirin 81 MG EC tablet Take 81 mg by mouth Daily.   • Biotin 5 MG capsule Take  by mouth Daily.   • escitalopram (LEXAPRO) 10 MG tablet TAKE 1 TABLET BY MOUTH EVERY DAY   • lisinopril (PRINIVIL,ZESTRIL) 5 MG tablet Take 2 tablets by mouth Daily. (Patient taking differently: Take 5 mg by mouth Daily.)   • Misc Natural Products (OSTEO BI-FLEX TRIPLE STRENGTH PO) Take  by mouth.   • Multiple Vitamins-Minerals (CENTRUM SILVER ADULT 50+ PO) Take  by mouth Daily.   • naproxen (NAPROSYN) 375 MG tablet TAKE 1 TABLET BY MOUTH TWICE A DAY WITH FOOD   • Omega-3 Fatty Acids (FISH OIL) 1200 MG capsule capsule Take  by mouth.   • propranolol (INDERAL) 10 MG tablet TAKE 1 TABLET BY MOUTH EVERY DAY   • pyridoxine (VITAMIN B-6) 100 MG tablet tablet Take  by mouth Daily.   • vitamin C (ASCORBIC ACID) 500 MG tablet Take 500 mg by mouth Daily.   • VITAMIN D, CHOLECALCIFEROL, PO Take  by mouth Daily.   • Zinc 50 MG tablet Take  by mouth Daily.     No current facility-administered medications on  file prior to visit.     Current Medications:  Scheduled Meds:  Continuous Infusions:No current facility-administered medications for this visit.    PRN Meds:.    I have reviewed the patient's medical history in detail and updated the computerized patient record.  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, left 1/3/2017   • Presbycusis, bilateral 2019   • Right shoulder pain 2020   • Sensory hearing loss, bilateral 2019   • Tremor 2020        Past Surgical History:   Procedure Laterality Date   • COLONOSCOPY N/A 2020    Procedure: COLONOSCOPY TO CECUM AND TERMINAL ILEUM;  Surgeon: Alejandro Noe MD;  Location: [facility] ENDOSCOPY;  Service: General;  Laterality: N/A;  SCREENING  post-- normal   • HERNIA REPAIR     • MENISCECTOMY Right    • PROSTATECTOMY          Social History     Occupational History   • Not on file   Tobacco Use   • Smoking status: Former Smoker     Packs/day: 0.50     Years: 5.00     Pack years: 2.50     Types: Cigarettes     Quit date:      Years since quittin.6   • Smokeless tobacco: Never Used   Vaping Use   • Vaping Use: Never used   Substance and Sexual Activity   • Alcohol use: Never   • Drug use: Never   • Sexual activity: Yes     Partners: Female      Social History     Social History Narrative   • Not on file      History reviewed. No pertinent family history.    ROS: 14 point review of systems was performed and all other systems were reviewed and are negative except for documented findings in HPI and today's encounter.  "    Allergies: No Known Allergies  Constitutional:  Denies fever, shaking or chills   Eyes:  Denies change in visual acuity   HENT:  Denies nasal congestion or sore throat   Respiratory:  Denies cough or shortness of breath   Cardiovascular:  Denies chest pain or severe LE edema   GI:  Denies abdominal pain, nausea, vomiting, bloody stools or diarrhea   Musculoskeletal:  Numbness, tingling, pain, or loss of motor function only as noted above in history of present illness.  : Denies painful urination or hematuria  Integument:  Denies rash, lesion or ulceration   Neurologic:  Denies headache or focal weakness  Endocrine:  Denies lymphadenopathy  Psych:  Denies confusion or change in mental status   Hem:  Denies active bleeding    OBJECTIVE:  Physical Exam: 72 y.o. male  Wt Readings from Last 3 Encounters:   08/01/22 88 kg (193 lb 14.4 oz)   07/11/22 88.1 kg (194 lb 3.2 oz)   01/26/22 88.9 kg (196 lb)     Ht Readings from Last 1 Encounters:   08/01/22 175.3 cm (69\")     Body mass index is 28.63 kg/m².  Vitals:    08/01/22 1109   Temp: 97.3 °F (36.3 °C)     Vital signs reviewed.     General Appearance:    Alert, cooperative, in no acute distress                  Eyes: conjunctiva clear  ENT: external ears and nose atraumatic  CV: no peripheral edema  Resp: normal respiratory effort  Skin: no rashes or wounds; normal turgor  Psych: mood and affect appropriate  Lymph: no nodes appreciated  Neuro: gross sensation intact  Vascular:  Palpable peripheral pulse in noted extremity  Musculoskeletal Extremities: Exam today shows pleasant man he has some effusion in his right knee compared to the left crepitation to range of motion he has varus about 5 degree flexion contracture Mikala's is negative he has some pseudolaxity and you can rotate and axial load his hip without increasing his symptoms    Radiology:   AP lateral 40 degree PA x-ray right knee taken the office today for complaints of pain without comparison views " available show advanced arthritic change with a varus pattern bone-on-bone osteophytes and subchondral sclerosis        Assessment:     ICD-10-CM ICD-9-CM   1. Pain  R52 780.96   2. Arthritis of right knee  M17.11 716.96        MDM/Plan:   The diagnosis(es), natural history, pathophysiology and treatment for diagnosis(es) were discussed. Opportunity given and questions answered.  Biomechanics of pertinent body areas discussed.  When appropriate, the use of ambulatory aids discussed.    Biomechanics of pertinent body areas discussed.  When appropriate, the use of ambulatory aids discussed.  BMI:  The concept of BMI body mass index and its importance and implications discussed.    EXERCISES:  Advice on benefits of, and types of regular/moderate exercise pertaining to orthopedic diagnosis(es).  MEDICATIONS:  The risks, benefits, warnings,side effects and alternatives of medications discussed.  Inflammation/pain control; with cold, heat, elevation and/or liniments discussed as appropriate  Cortisone Injection. See procedure note.  PT referral.  MEDICAL RECORDS reviewed from other provider(s) for past and current medical history pertinent to this complaint.      8/1/2022    Dictated utilizing Dragon dictation

## 2022-08-23 ENCOUNTER — TREATMENT (OUTPATIENT)
Dept: PHYSICAL THERAPY | Facility: CLINIC | Age: 72
End: 2022-08-23

## 2022-08-23 DIAGNOSIS — G89.29 CHRONIC PAIN OF RIGHT KNEE: Primary | ICD-10-CM

## 2022-08-23 DIAGNOSIS — M25.561 CHRONIC PAIN OF RIGHT KNEE: Primary | ICD-10-CM

## 2022-08-23 DIAGNOSIS — M17.11 PRIMARY OSTEOARTHRITIS OF RIGHT KNEE: ICD-10-CM

## 2022-08-23 DIAGNOSIS — R26.2 DIFFICULTY WALKING: ICD-10-CM

## 2022-08-23 PROCEDURE — 97530 THERAPEUTIC ACTIVITIES: CPT | Performed by: PHYSICAL THERAPIST

## 2022-08-23 PROCEDURE — 97161 PT EVAL LOW COMPLEX 20 MIN: CPT | Performed by: PHYSICAL THERAPIST

## 2022-08-23 PROCEDURE — 97110 THERAPEUTIC EXERCISES: CPT | Performed by: PHYSICAL THERAPIST

## 2022-08-23 NOTE — PROGRESS NOTES
"Physical Therapy Initial Evaluation and Plan of Care    Patient: Saleem Aguilar   : 1950  Diagnosis/ICD-10 Code:  Chronic pain of right knee [M25.561, G89.29]  Referring practitioner: Singh Goetz MD    Subjective Evaluation    History of Present Illness  Onset date: chronic with acute exacerbation beginning 6 months ago.  Mechanism of injury: Patient reports (R) knee pain intermittently which progressed to constant pain approximately 6 months ago. Patient does report (R) knee meniscectomy around  with good outcome.      Received cortisone injection on 2022, the first he has had in the knee. He does report some improvement in (R) knee symptoms, approximately 25% improved.  He has been able to increase his activity level a little bit since the injection.    He denies pain with steps but reports pain with prolonged standing, prolonged sitting, walking, squatting, and being physically active out in his yard.    Of note, patient fell out of his bed while sleeping  morning, striking his (R) hand on a piece of furniture and suffering 2 fractures in his hand/wrist.  Patient sees the orthopedic doctor tomorrow in consult for this.    PMH prostate CA ~, COPD, BPPV, HTN, GERD, depression. Patient reports some crepitus in (L) knee to lesser degree but without accompanying pain.      Patient Occupation: N/A   Precautions and Work Restrictions: hobbies: gardening, mowing the lawn, being outsideQuality of life: good    Pain  Current pain ratin  At worst pain ratin  Location: (R) medial aspect of knee, \"inside the joint\"  Quality: dull ache  Relieving factors: medications (naproxen)  Aggravating factors: standing, ambulation, squatting and prolonged positioning  Progression: worsening (some improvement since injection)    Social Support  Lives in: one-story house    Hand dominance: right    Diagnostic Tests  X-ray: abnormal (advanced arthritic change with a varus pattern bone-on-bone osteophytes " "and subchondral sclerosis)    Patient Goals  Patient goals for therapy: increased strength  Patient goal: \"keep the arthritis/pain at bay\"/stay as active as possible with minimal pain           Subjective Questionnaire: LEFS: 57/80    Objective          Tenderness     Additional Tenderness Details  Neg TTP (R) knee    Active Range of Motion   Left Knee   Flexion: 121 degrees   Extension: 0 degrees   Extensor la degrees     Right Knee   Flexion: 93 degrees   Extension: 0 degrees   Extensor la degrees     Additional Active Range of Motion Details  (R) knee flexion AROM limited by \"stiffness,\" non-painful    Strength/Myotome Testing     Left Hip   Planes of Motion   Flexion: 4  Abduction: 4-  Adduction: 4-    Right Hip   Planes of Motion   Flexion: 4-  Abduction: 4-  Adduction: 3+    Left Knee   Flexion: 4  Extension: 4+  Quadriceps contraction: good    Right Knee   Flexion: 4-  Extension: 4  Quadriceps contraction: fair    Left Ankle/Foot   Dorsiflexion: 4+    Right Ankle/Foot   Dorsiflexion: 4+    Swelling     Left Knee Girth Measurement (cm)   Joint line: 40.3.    Right Knee Girth Measurement (cm)   Joint line: 40.7.    Ambulation     Observational Gait   Gait: antalgic   Decreased walking speed, right stance time and left step length.           Assessment & Plan     Assessment  Impairments: abnormal coordination, abnormal gait, abnormal muscle firing, abnormal or restricted ROM, activity intolerance, impaired balance, impaired physical strength, lacks appropriate home exercise program and pain with function  Functional Limitations: walking, uncomfortable because of pain, standing and stooping  Assessment details: Patient is a 72 y.o male with progressive pain and difficulty functioning due to (R) knee pain.  The pain has become constant and more severe within the past 6 months.  He was determined via radiography to have end stage knee OA.  He did receive a cortisone injection on 2022.  He rates " "symptoms 1-5/10 (R) medial knee, worst with prolonged standing, walking, and squatting. He exhibits decreased (R) knee AROM, decreased proximal and distal LE strength, and compensated gait.  His LEFS score is 57/80 indicating a moderate perceived level of functional limitation. He will benefit from skilled PT services to address these deficits to optimize tolerance to functional activities and mobility.  Prognosis: good    Goals  Plan Goals: STGs: to be met in 4 weeks  1. Patient will be independent with initial HEP  2. Patient will report improved (R) knee symptoms 0-3/10 for improved tolerance to ADLs and functional mobility  3. Patient will demonstrate improved (R) knee AROM 0-110 deg for improved joint mobility  4. Patient will perform sit to stand from 22\" surface x 10 reps without UE assist as evidence of improved functional strength    LTGs: to be met in 8 weeks  1. Patient will be independent with progressed HEP  2. Patient will report improved (R) knee symptoms 0-2/10 for improved tolerance to functional activities and mobility  3. Patient will demonstrate improved (R) knee AROM 0-120 deg for improved joint mobility  4. Patient will demonstrate improved (R) LE strength >/= 4+/5 to 5/5 for improved functional strength of LE  5. Patient will consistently ambulate with minimal compensation for improved mobility tolerance  6. Patient will have improved LEFS score >/= 66/80 for subjective evidence of functional improvement    Plan  Therapy options: will be seen for skilled therapy services  Planned modality interventions: thermotherapy (hydrocollator packs) and cryotherapy  Planned therapy interventions: ADL retraining, balance/weight-bearing training, fine motor coordination training, flexibility, functional ROM exercises, gait training, home exercise program, manual therapy, neuromuscular re-education, soft tissue mobilization, strengthening, stretching and therapeutic activities  Frequency: 2x week  Duration " in weeks: 8  Treatment plan discussed with: patient        Manual Therapy:         mins  63485;  Therapeutic Exercise:    17     mins  03840;     Neuromuscular Diana:        mins  88143;    Therapeutic Activity:     11     mins  72454;     Gait Training:           mins  11468;     Ultrasound:          mins  32385;    Electrical Stimulation:         mins  01443 ( );  Dry Needling          mins self-pay    Timed Treatment:   28   mins   Total Treatment:     46   mins    PT SIGNATURE: Ting Mujica PT, DPT, OCS  Electronically signed by: Ting Mujica PT, 08/23/22, 9:57 AM EDT  [place] License #[id number]     DATE TREATMENT INITIATED: 8/23/2022    Initial Certification  Certification Period: 8/23/2022 thru 11/20/2022  I certify that the therapy services are furnished while this patient is under my care.  The services outlined above are required by this patient, and will be reviewed every 90 days.     PHYSICIAN: Singh Goetz MD  [id number]                                          DATE:     Please sign and return via fax to [phone]. Thank you, [facility].

## 2022-08-30 ENCOUNTER — TREATMENT (OUTPATIENT)
Dept: PHYSICAL THERAPY | Facility: CLINIC | Age: 72
End: 2022-08-30

## 2022-08-30 DIAGNOSIS — R26.2 DIFFICULTY WALKING: ICD-10-CM

## 2022-08-30 DIAGNOSIS — M17.11 PRIMARY OSTEOARTHRITIS OF RIGHT KNEE: ICD-10-CM

## 2022-08-30 DIAGNOSIS — G89.29 CHRONIC PAIN OF RIGHT KNEE: Primary | ICD-10-CM

## 2022-08-30 DIAGNOSIS — M25.561 CHRONIC PAIN OF RIGHT KNEE: Primary | ICD-10-CM

## 2022-08-30 PROCEDURE — 97110 THERAPEUTIC EXERCISES: CPT | Performed by: PHYSICAL THERAPIST

## 2022-08-30 NOTE — PROGRESS NOTES
Physical Therapy Daily Treatment Note      Patient: Saleem Aguilar   : 1950  Referring practitioner: Singh Goetz MD  Date of Initial Visit: Type: THERAPY  Noted: 2022  Today's Date: 2022  Patient seen for 2 sessions         Visit Diagnoses:     ICD-10-CM ICD-9-CM   1. Chronic pain of right knee  M25.561 719.46    G89.29 338.29   2. Primary osteoarthritis of right knee  M17.11 715.16   3. Difficulty walking  R26.2 719.7         Subjective Evaluation    History of Present Illness    Subjective comment: I saw the orthopedic doctor for my hand and he just put me in a splint/no surgery is required.  My knee got a little irritated [place].  I can't tell that the injection really helped it much.  I have been working on the physical therapy exercises.Pain  Pain scale: discomfort.           Objective   See Exercise, Manual, and Modality Logs for complete treatment.   *Increased exercise repetitions where appropriate  *Initiated SAQ, sidelying hip abduction, and sidelying clamshell    Assessment & Plan     Assessment    Assessment details: Patient experiences discomfort with (R) knee extension activities such as LAQ and SAQ.  He is able to progress proximal/hip strengthening activities.  He is educated in identification of readiness for and implementation of exercise repetition progression during HEP.  He questions the use of a prescribed external knee support brace and is advised to use it while mowing the grass and performing other more strenuous activities only but not all the time.  He verbalizes understanding.  Updated HEP is prescribed to facilitate compliance and recall.          Progress strengthening /stabilization /functional activity         Timed:  Manual Therapy:         mins  35015;  Therapeutic Exercise:    29     mins  23867;     Neuromuscular Diana:        mins  89655;    Therapeutic Activity:          mins  23543;     Gait Training:           mins   03945;     Ultrasound:          mins  63672;    Untimed:  Electrical Stimulation:         mins  28407 ( );  Mechanical Traction:         mins  09682;   Dry Needling              ___  mins   20561    Timed Treatment:   29   mins   Total Treatment:     29   mins    Ting Mujica PT, DPT, OCS  Physical Therapist  [place] License #[id number]  Electronically signed by: Ting Mujica PT, 08/30/22, 11:26 AM EDT

## 2022-09-01 ENCOUNTER — TREATMENT (OUTPATIENT)
Dept: PHYSICAL THERAPY | Facility: CLINIC | Age: 72
End: 2022-09-01

## 2022-09-01 DIAGNOSIS — R26.2 DIFFICULTY WALKING: ICD-10-CM

## 2022-09-01 DIAGNOSIS — M25.561 CHRONIC PAIN OF RIGHT KNEE: Primary | ICD-10-CM

## 2022-09-01 DIAGNOSIS — M17.11 PRIMARY OSTEOARTHRITIS OF RIGHT KNEE: ICD-10-CM

## 2022-09-01 DIAGNOSIS — G89.29 CHRONIC PAIN OF RIGHT KNEE: Primary | ICD-10-CM

## 2022-09-01 PROCEDURE — 97110 THERAPEUTIC EXERCISES: CPT | Performed by: PHYSICAL THERAPIST

## 2022-09-01 NOTE — PROGRESS NOTES
Physical Therapy Daily Progress Note      Visit # 3      Subjective Evaluation    History of Present Illness    Subjective comment: Pt reports that his (R) knee is a little sore today.  1/10Pain  Current pain ratin           Objective   See Exercise, Manual, and Modality Logs for complete treatment.   Added sit to stand, standing heel raise, standing hip abd    Assessment & Plan     Assessment    Assessment details: Pt completed treatment with mild increase in (R) knee pain.  He was able to progress reps on most strengthening exercise with no issues.  He tolerated the introduction of closed chain exercises with a small increase in pain.  Continue to progress per POC                     Manual Therapy:    0     mins  68412;  Therapeutic Exercise:    31     mins  50136;     Neuromuscular Diana:    0    mins  25574;    Therapeutic Activity:     0     mins  03507;     Gait Trainin     mins  13051;     Ultrasound:     0     mins  93749;    Work Hardening           0      mins 28559  Iontophoresis               0   mins 96195  E-Stim                          _0_ mins 77099 ( )    Timed Treatment:   31   mins   Total Treatment:     31   mins    Immanuel Bhat PTA  Physical Therapist Assistant

## 2022-09-04 DIAGNOSIS — M54.42 ACUTE LOW BACK PAIN WITH LEFT-SIDED SCIATICA, UNSPECIFIED BACK PAIN LATERALITY: ICD-10-CM

## 2022-09-06 ENCOUNTER — TREATMENT (OUTPATIENT)
Dept: PHYSICAL THERAPY | Facility: CLINIC | Age: 72
End: 2022-09-06

## 2022-09-06 DIAGNOSIS — R26.2 DIFFICULTY WALKING: ICD-10-CM

## 2022-09-06 DIAGNOSIS — M25.561 CHRONIC PAIN OF RIGHT KNEE: Primary | ICD-10-CM

## 2022-09-06 DIAGNOSIS — G89.29 CHRONIC PAIN OF RIGHT KNEE: Primary | ICD-10-CM

## 2022-09-06 DIAGNOSIS — M17.11 PRIMARY OSTEOARTHRITIS OF RIGHT KNEE: ICD-10-CM

## 2022-09-06 PROCEDURE — 97110 THERAPEUTIC EXERCISES: CPT | Performed by: PHYSICAL THERAPIST

## 2022-09-06 RX ORDER — NAPROXEN 375 MG/1
TABLET ORAL
Qty: 60 TABLET | Refills: 0 | Status: SHIPPED | OUTPATIENT
Start: 2022-09-06

## 2022-09-06 NOTE — PROGRESS NOTES
Physical Therapy Daily Treatment Note      Patient: Saleem Aguilar   : 1950  Referring practitioner: Singh Goetz MD  Date of Initial Visit: Type: THERAPY  Noted: 2022  Today's Date: 2022  Patient seen for 4 sessions         Visit Diagnoses:     ICD-10-CM ICD-9-CM   1. Chronic pain of right knee  M25.561 719.46    G89.29 338.29   2. Primary osteoarthritis of right knee  M17.11 715.16   3. Difficulty walking  R26.2 719.7         Subjective Evaluation    History of Present Illness    Subjective comment: My knee is doing alright.  It's sore for a few minutes in the front after I do the exercises, but probably only 15-20 minutes or so.Pain  Current pain ratin           Objective   See Exercise, Manual, and Modality Logs for complete treatment.       Assessment & Plan     Assessment    Assessment details: Patient verbalizes decreased (R) knee soreness with performance of exercise today compared to previous visit.  His gait pattern has minimally improved including more equal LE weightshift, stance time, and step length.  He is able to achieve near full knee extension during most open chain LE strengthening activities.  He will benefit from progression of functional strengthening activities for the (R) LE, specifically closed chain, to improve functional activity and mobility performance.           Progress strengthening /stabilization /functional activity         Timed:  Manual Therapy:         mins  30674;  Therapeutic Exercise:    33     mins  11941;     Neuromuscular Diana:        mins  02803;    Therapeutic Activity:          mins  23323;     Gait Training:           mins  64833;     Ultrasound:          mins  15453;    Untimed:  Electrical Stimulation:         mins  04833 ( );  Mechanical Traction:         mins  32728;   Dry Needling              ___  mins   58842    Timed Treatment:   33   mins   Total Treatment:     33   mins    Ting Mujica, PT, DPT, OCS  Physical Therapist  [place] License  #[id number]  Electronically signed by: Ting Mujica PT, 09/06/22, 11:26 AM EDT

## 2022-09-08 ENCOUNTER — TREATMENT (OUTPATIENT)
Dept: PHYSICAL THERAPY | Facility: CLINIC | Age: 72
End: 2022-09-08

## 2022-09-08 DIAGNOSIS — G89.29 CHRONIC PAIN OF RIGHT KNEE: Primary | ICD-10-CM

## 2022-09-08 DIAGNOSIS — M25.561 CHRONIC PAIN OF RIGHT KNEE: Primary | ICD-10-CM

## 2022-09-08 DIAGNOSIS — R26.2 DIFFICULTY WALKING: ICD-10-CM

## 2022-09-08 DIAGNOSIS — M17.11 PRIMARY OSTEOARTHRITIS OF RIGHT KNEE: ICD-10-CM

## 2022-09-08 PROCEDURE — 97110 THERAPEUTIC EXERCISES: CPT | Performed by: PHYSICAL THERAPIST

## 2022-09-08 NOTE — PROGRESS NOTES
Physical Therapy Daily Treatment Note      Patient: Saleem Aguilar   : 1950  Referring practitioner: Singh Goetz MD  Date of Initial Visit: Type: THERAPY  Noted: 2022  Today's Date: 2022  Patient seen for 5 sessions         Visit Diagnoses:     ICD-10-CM ICD-9-CM   1. Chronic pain of right knee  M25.561 719.46    G89.29 338.29   2. Primary osteoarthritis of right knee  M17.11 715.16   3. Difficulty walking  R26.2 719.7         Subjective Evaluation    History of Present Illness    Subjective comment: My knee feels alright as long as I don't work it.  It gets sore from the exercises though. Pain  Current pain ratin  Location: (R) knee           Objective          Active Range of Motion     Right Knee   Flexion: 112 (supine) degrees     Passive Range of Motion     Right Knee   Flexion: 115 (supine) degrees       See Exercise, Manual, and Modality Logs for complete treatment.   *Initiated standing knee flexion and marching    Assessment & Plan     Assessment    Assessment details: Patient demonstrates good exercise recall and form with minimal intermittent cueing necessary to optimize performance.  He is able to progress standing LE strengthening with reported muscular fatigue and an episode of (R) hamstring cramping with initial repetition of standing knee flexion.  He is cued to decrease intensity of effort and instructed in gentle standing hamstring stretch after cramping subsides. Will continue to monitor HEP tolerance and (R) knee symptoms to progress accordingly.          Progress strengthening /stabilization /functional activity         Timed:  Manual Therapy:         mins  79682;  Therapeutic Exercise:    34     mins  72528;     Neuromuscular Diana:        mins  15042;    Therapeutic Activity:          mins  71088;     Gait Training:           mins  96605;     Ultrasound:          mins  98858;    Untimed:  Electrical Stimulation:         mins  50053 ( );  Mechanical Traction:          mins  99021;   Dry Needling              ___  mins   47559    Timed Treatment:   34   mins   Total Treatment:     34   mins    Ting Mujica PT, DPT, OCS  Physical Therapist  [place] License #[id number]  Electronically signed by: Ting Mujica PT, 09/08/22, 9:59 AM EDT

## 2022-09-13 ENCOUNTER — TREATMENT (OUTPATIENT)
Dept: PHYSICAL THERAPY | Facility: CLINIC | Age: 72
End: 2022-09-13

## 2022-09-13 DIAGNOSIS — M17.11 PRIMARY OSTEOARTHRITIS OF RIGHT KNEE: ICD-10-CM

## 2022-09-13 DIAGNOSIS — M25.561 CHRONIC PAIN OF RIGHT KNEE: Primary | ICD-10-CM

## 2022-09-13 DIAGNOSIS — G89.29 CHRONIC PAIN OF RIGHT KNEE: Primary | ICD-10-CM

## 2022-09-13 DIAGNOSIS — R26.2 DIFFICULTY WALKING: ICD-10-CM

## 2022-09-13 PROCEDURE — 97110 THERAPEUTIC EXERCISES: CPT | Performed by: PHYSICAL THERAPIST

## 2022-09-13 PROCEDURE — 97530 THERAPEUTIC ACTIVITIES: CPT | Performed by: PHYSICAL THERAPIST

## 2022-09-13 NOTE — PROGRESS NOTES
Physical Therapy Daily Progress Note      Visit # 6      Subjective Evaluation    History of Present Illness    Subjective comment: Pt reports that he has a little soreness in his (R) knee , but no pain.         Objective   See Exercise, Manual, and Modality Logs for complete treatment.   Added step FWD/lateral and TKE.    Assessment & Plan     Assessment    Assessment details: Pt completed treatment with no c/o pain.  Pt was able to progress reps on most strengthening exercises with no issues.  Pt tolerated the introduction of step ups fwd/lateral.  Pt had visible signs of fatigue as treatment progressed.                     Manual Therapy:    0     mins  36543;  Therapeutic Exercise:    24(31)     mins  19117;     Neuromuscular Diana:    0    mins  61471;    Therapeutic Activity:     14     mins  71560;     Gait Trainin     mins  14878;     Ultrasound:     0     mins  38354;    Work Hardening           0      mins 85104  Iontophoresis               0   mins 75337  E-Stim                          _0_ mins 34890 ( )    Timed Treatment:   38(45)   mins   Total Treatment:     45   mins    Immanuel Bhat PTA  Physical Therapist Assistant

## 2022-09-20 ENCOUNTER — TREATMENT (OUTPATIENT)
Dept: PHYSICAL THERAPY | Facility: CLINIC | Age: 72
End: 2022-09-20

## 2022-09-20 DIAGNOSIS — M17.11 PRIMARY OSTEOARTHRITIS OF RIGHT KNEE: ICD-10-CM

## 2022-09-20 DIAGNOSIS — G89.29 CHRONIC PAIN OF RIGHT KNEE: Primary | ICD-10-CM

## 2022-09-20 DIAGNOSIS — R26.2 DIFFICULTY WALKING: ICD-10-CM

## 2022-09-20 DIAGNOSIS — M25.561 CHRONIC PAIN OF RIGHT KNEE: Primary | ICD-10-CM

## 2022-09-20 PROCEDURE — 97110 THERAPEUTIC EXERCISES: CPT | Performed by: PHYSICAL THERAPIST

## 2022-09-20 NOTE — PROGRESS NOTES
Physical Therapy Daily Progress Note      Visit # 7      Subjective Evaluation    History of Present Illness    Subjective comment: Pt denies pain in (R) knee today, but does have pain in (R) hamstring.       Objective   See Exercise, Manual, and Modality Logs for complete treatment.   Added seated HS stretch    Assessment & Plan     Assessment    Assessment details: Pt completed treatment with no c/o pain.  Pt was able to progress reps with only c/o fatigue in (B) LE.  Continue to progress functional closed chain exercises as tolerated.                     Manual Therapy:    0     mins  99873;  Therapeutic Exercise:    38(50)     mins  30453;     Neuromuscular Diana:    0    mins  79404;    Therapeutic Activity:     0     mins  15135;     Gait Trainin     mins  23433;     Ultrasound:     0     mins  32810;    Work Hardening           0      mins 78085  Iontophoresis               0   mins 62464  E-Stim                          _0_ mins 53899 ( )    Timed Treatment:   38(50)   mins   Total Treatment:     50   mins    Immanuel Bhat PTA  Physical Therapist Assistant

## 2022-09-22 ENCOUNTER — TREATMENT (OUTPATIENT)
Dept: PHYSICAL THERAPY | Facility: CLINIC | Age: 72
End: 2022-09-22

## 2022-09-22 DIAGNOSIS — G89.29 CHRONIC PAIN OF RIGHT KNEE: Primary | ICD-10-CM

## 2022-09-22 DIAGNOSIS — M17.11 PRIMARY OSTEOARTHRITIS OF RIGHT KNEE: ICD-10-CM

## 2022-09-22 DIAGNOSIS — R26.2 DIFFICULTY WALKING: ICD-10-CM

## 2022-09-22 DIAGNOSIS — M25.561 CHRONIC PAIN OF RIGHT KNEE: Primary | ICD-10-CM

## 2022-09-22 PROCEDURE — 97530 THERAPEUTIC ACTIVITIES: CPT | Performed by: PHYSICAL THERAPIST

## 2022-09-22 PROCEDURE — 97110 THERAPEUTIC EXERCISES: CPT | Performed by: PHYSICAL THERAPIST

## 2022-09-22 NOTE — PROGRESS NOTES
Re-Assessment / Re-Certification    Patient: Saleem Aguilar   : 1950  Diagnosis/ICD-10 Code:  Chronic pain of right knee [M25.561, G89.29]  Referring practitioner: Singh Goetz MD  Date of Initial Visit: 2022  Today's Date: 2022  Patient seen for 8 sessions      Subjective:   Subjective Questionnaire: LEFS: 58/80  Clinical Progress: improved  Home Program Compliance: Yes  Treatment has included: therapeutic exercise, neuromuscular re-education and therapeutic activity    Subjective Evaluation    History of Present Illness    Subjective comment: I think my knee is doing a little better since starting therapy.  The pain is not as intense as it had been.  I can be up on my feet doing things for longer before it starts to bother me. Pain  Current pain ratin  Pain scale at highest: 3-4/10.         Objective          Active Range of Motion     Right Knee   Flexion: 123 degrees   Extension: 0 degrees   Extensor la degrees     Strength/Myotome Testing     Right Knee   Flexion: 4+  Extension: 4+  Quadriceps contraction: good      Assessment & Plan     Assessment    Assessment details: Patient remains motivated and compliant with PT interventions.  He reports improved (R) knee symptoms 0-4/10 with activities such as prolonged standing and walking, and he states he is able to participate in these activities for longer periods of time prior to onset of symptoms.  He demonstrates much improved (R) knee AROM, now WNL, and steady improvement in functional strength of (R) LE.  His LEFS score is 58/80 indicating a moderate perceived level of functional improvement.  He will benefit from a short course of continued skilled PT services to optimize functional activity and mobility tolerance for improved QOL.    Goals  Plan Goals: STGs: to be met in 2 weeks - ALL MET  1. Patient will be independent with initial HEP - MET  2. Patient will report improved (R) knee symptoms 0-3/10 for improved tolerance to ADLs and  "functional mobility - MET  3. Patient will demonstrate improved (R) knee AROM 0-110 deg for improved joint mobility - MET  4. Patient will perform sit to stand from 22\" surface x 10 reps without UE assist as evidence of improved functional strength - MET    LTGs: to be met in 4 weeks  1. Patient will be independent with progressed HEP - PARTIALLY MET  2. Patient will report improved (R) knee symptoms 0-2/10 for improved tolerance to functional activities and mobility - NOT MET  3. Patient will demonstrate improved (R) knee AROM 0-120 deg for improved joint mobility - MET  4. Patient will demonstrate improved (R) LE strength >/= 4+/5 to 5/5 for improved functional strength of LE - PARTIALLY MET  5. Patient will consistently ambulate with minimal compensation for improved mobility tolerance - MET  6. Patient will have improved LEFS score >/= 66/80 for subjective evidence of functional improvement - NOT MET      Progress toward previous goals: Partially Met  Recommendations: Continue as planned  Timeframe: 1 month  Prognosis to achieve goals: good    PT Signature: Ting Mujica, PT, DPT, OCS  [place] License # [id number]      Based upon review of the patient's progress and continued therapy plan, it is my medical opinion that Saleem Aguilar should continue physical therapy treatment at [facility]  [address] [place] [id number] [phone].    Signature: __________________________________  Singh Goetz MD    Manual Therapy:         mins  47059;  Therapeutic Exercise:    40     mins  97892;     Neuromuscular Diana:        mins  01080;    Therapeutic Activity:     18     mins  28907;     Gait Training:           mins  99364;     Ultrasound:          mins  90189;    Electrical Stimulation:         mins  73181 ( );  Dry Needling          mins self-pay    Timed Treatment:   58   mins   Total Treatment:     58   mins    Please sign and return via fax to [phone] " [phone]. Thank you, [facility].

## 2022-09-27 ENCOUNTER — TREATMENT (OUTPATIENT)
Dept: PHYSICAL THERAPY | Facility: CLINIC | Age: 72
End: 2022-09-27

## 2022-09-27 DIAGNOSIS — M17.11 PRIMARY OSTEOARTHRITIS OF RIGHT KNEE: ICD-10-CM

## 2022-09-27 DIAGNOSIS — M25.561 CHRONIC PAIN OF RIGHT KNEE: Primary | ICD-10-CM

## 2022-09-27 DIAGNOSIS — R26.2 DIFFICULTY WALKING: ICD-10-CM

## 2022-09-27 DIAGNOSIS — G89.29 CHRONIC PAIN OF RIGHT KNEE: Primary | ICD-10-CM

## 2022-09-27 PROCEDURE — 97110 THERAPEUTIC EXERCISES: CPT | Performed by: PHYSICAL THERAPIST

## 2022-09-27 NOTE — PROGRESS NOTES
Physical Therapy Daily Treatment Note      Patient: Saleem Aguilar   : 1950  Referring practitioner: Singh Goetz MD  Date of Initial Visit: Type: THERAPY  Noted: 2022  Today's Date: 2022  Patient seen for 9 sessions         Visit Diagnoses:     ICD-10-CM ICD-9-CM   1. Chronic pain of right knee  M25.561 719.46    G89.29 338.29   2. Primary osteoarthritis of right knee  M17.11 715.16   3. Difficulty walking  R26.2 719.7         Subjective Evaluation    History of Present Illness    Subjective comment: My knee feels pretty good most of the time.  It gets a little sore for a very short period when I exercise it but then it settles back down.       Objective   See Exercise, Manual, and Modality Logs for complete treatment.       Assessment & Plan     Assessment    Assessment details: Patient demonstrates good therapeutic exercise recall.  Spent some time discussing exercise progression appropriately.  Patient does report some muscular fatigue with repeated quadriceps emphasis activities. He will benefit from one additional PT session to finalize progressed HEP with a subsequent anticipated D/C. Patient is agreeable to this plan.          Anticipate DC next Visit         Timed:  Manual Therapy:         mins  22905;  Therapeutic Exercise:    47     mins  01753;     Neuromuscular Diana:        mins  77611;    Therapeutic Activity:          mins  98540;     Gait Training:           mins  07754;     Ultrasound:          mins  51810;    Untimed:  Electrical Stimulation:         mins  41723 ( );  Mechanical Traction:         mins  81061;   Dry Needling              ___  mins   66176    Timed Treatment:   47   mins   Total Treatment:     47   mins    Ting Mujica PT, DPT, OCS  Physical Therapist  [place] License #[id number]  Electronically signed by: Ting Mujiac PT, 22, 9:01 AM EDT

## 2022-09-29 ENCOUNTER — TREATMENT (OUTPATIENT)
Dept: PHYSICAL THERAPY | Facility: CLINIC | Age: 72
End: 2022-09-29

## 2022-09-29 DIAGNOSIS — R26.2 DIFFICULTY WALKING: ICD-10-CM

## 2022-09-29 DIAGNOSIS — M25.561 CHRONIC PAIN OF RIGHT KNEE: Primary | ICD-10-CM

## 2022-09-29 DIAGNOSIS — M17.11 PRIMARY OSTEOARTHRITIS OF RIGHT KNEE: ICD-10-CM

## 2022-09-29 DIAGNOSIS — G89.29 CHRONIC PAIN OF RIGHT KNEE: Primary | ICD-10-CM

## 2022-09-29 PROCEDURE — 97110 THERAPEUTIC EXERCISES: CPT | Performed by: PHYSICAL THERAPIST

## 2022-09-29 PROCEDURE — 97530 THERAPEUTIC ACTIVITIES: CPT | Performed by: PHYSICAL THERAPIST

## 2022-09-29 NOTE — PROGRESS NOTES
"   Physical Therapy Daily Treatment Note      Patient: Saleem Aguilar   : 1950  Referring practitioner: Singh Goetz MD  Date of Initial Visit: Type: THERAPY  Noted: 2022  Today's Date: 2022  Patient seen for 10 sessions         Visit Diagnoses:     ICD-10-CM ICD-9-CM   1. Chronic pain of right knee  M25.561 719.46    G89.29 338.29   2. Primary osteoarthritis of right knee  M17.11 715.16   3. Difficulty walking  R26.2 719.7         Subjective Evaluation    History of Present Illness    Subjective comment: My knee is still doing pretty well.       Objective          Active Range of Motion     Right Knee   Flexion: 122 (a little tight, sore) degrees   Extension: 0 degrees   Extensor la degrees     Strength/Myotome Testing     Right Knee   Flexion: 4+  Extension: 5  Quadriceps contraction: good      See Exercise, Manual, and Modality Logs for complete treatment.       Assessment & Plan     Assessment    Assessment details: Patient remains motivated and compliant with PT interventions.  He continues to report steady and consistent improvement in (R) knee symptoms and functional activity tolerance.  His quality of gait has notably improved to include more equal stance time, step length, and active knee flexion during swing phase.  His (R) knee AROM and strength are now functional.  He has met all established goals except for his LEFS score.  At this time he demonstrates independence with progressed HEP and will be discharged to independence with HEP.  Patient is agreeable to this and all questions have been answered to his satisfaction.    Goals  Plan Goals: STGs: ALL MET  1. Patient will be independent with initial HEP - MET  2. Patient will report improved (R) knee symptoms 0-3/10 for improved tolerance to ADLs and functional mobility - MET  3. Patient will demonstrate improved (R) knee AROM 0-110 deg for improved joint mobility - MET  4. Patient will perform sit to stand from 22\" surface x 10 reps " without UE assist as evidence of improved functional strength - MET    LTGs:   1. Patient will be independent with progressed HEP - MET  2. Patient will report improved (R) knee symptoms 0-2/10 for improved tolerance to functional activities and mobility - MET  3. Patient will demonstrate improved (R) knee AROM 0-120 deg for improved joint mobility - MET  4. Patient will demonstrate improved (R) LE strength >/= 4+/5 to 5/5 for improved functional strength of LE - MET  5. Patient will consistently ambulate with minimal compensation for improved mobility tolerance - MET  6. Patient will have improved LEFS score >/= 66/80 for subjective evidence of functional improvement - NOT MET          Other - discharge skilled PT services to independence with HEP.          Timed:  Manual Therapy:         mins  20092;  Therapeutic Exercise:    45     mins  76224;     Neuromuscular Diana:        mins  43724;    Therapeutic Activity:     9     mins  48709;     Gait Training:           mins  66426;     Ultrasound:          mins  32692;    Untimed:  Electrical Stimulation:         mins  36922 ( );  Mechanical Traction:         mins  28552;   Dry Needling              ___  mins   51431    Timed Treatment:   54   mins   Total Treatment:     54   mins    Ting Mujica PT, DPT, OCS  Physical Therapist  [place] License #[id number]  Electronically signed by: Ting Mujica PT, 09/29/22, 11:30 AM EDT

## 2022-09-30 ENCOUNTER — OFFICE VISIT (OUTPATIENT)
Dept: FAMILY MEDICINE CLINIC | Facility: CLINIC | Age: 72
End: 2022-09-30

## 2022-09-30 VITALS
BODY MASS INDEX: 28.53 KG/M2 | WEIGHT: 192.6 LBS | HEIGHT: 69 IN | SYSTOLIC BLOOD PRESSURE: 112 MMHG | DIASTOLIC BLOOD PRESSURE: 80 MMHG | RESPIRATION RATE: 16 BRPM

## 2022-09-30 DIAGNOSIS — I10 ESSENTIAL HYPERTENSION: Primary | ICD-10-CM

## 2022-09-30 DIAGNOSIS — S62.101A WRIST FRACTURE, CLOSED, RIGHT, INITIAL ENCOUNTER: ICD-10-CM

## 2022-09-30 DIAGNOSIS — K21.9 GASTROESOPHAGEAL REFLUX DISEASE WITHOUT ESOPHAGITIS: ICD-10-CM

## 2022-09-30 DIAGNOSIS — G89.29 CHRONIC RIGHT-SIDED LOW BACK PAIN WITH RIGHT-SIDED SCIATICA: ICD-10-CM

## 2022-09-30 DIAGNOSIS — M54.41 CHRONIC RIGHT-SIDED LOW BACK PAIN WITH RIGHT-SIDED SCIATICA: ICD-10-CM

## 2022-09-30 PROCEDURE — 99214 OFFICE O/P EST MOD 30 MIN: CPT | Performed by: INTERNAL MEDICINE

## 2022-09-30 NOTE — PROGRESS NOTES
09/30/2022    CC: Hand Injury (Follow up from [facility] urgent care.)  .        HPI  Hand Injury   The incident occurred more than 1 week ago. The incident occurred at home. The injury mechanism was a direct blow. The pain is present in the right wrist. The quality of the pain is described as aching. The pain does not radiate. The pain is moderate. The pain has been improving since the incident.        Abel Aguilar is a 72 y.o. male.      The following portions of the patient's history were reviewed and updated as appropriate: allergies, current medications, past family history, past medical history, past social history, past surgical history and problem list.    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, left   • 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, right   • Essential hypertension, benign   • Disease of tongue   • Gastroesophageal reflux disease   • Right shoulder pain   • Tremor   • Abnormal electrocardiogram   • Acute chest pain   • Benign essential tremor   • Carpal tunnel syndrome, bilateral   • Hypertension   • Inguinal hernia   • Memory loss   • PVC (premature ventricular contraction)   • Screening for colon cancer       Past Medical History  Past Medical History:   Diagnosis Date   • Benign positional vertigo 6/26/2019   • Cancer ([facility])    • COPD (chronic obstructive pulmonary disease) ([facility]) 7/23/2018   • Disease of tongue 7/17/2020   • Essential hypertension, benign 7/9/2012   • Gastroesophageal reflux disease 7/17/2020   • Major depression, single episode 12/19/2019   • 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, right 1/3/2017   • 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 No pertinent family history.    Social History  Social History    Tobacco Use      Smoking status: Former Smoker        Packs/day: 0.50        Years: 5.00        Pack years: 2.5        Types: Cigarettes        Quit date:         Years since quittin.7      Smokeless tobacco: Never Used       Is the Patient a current tobacco user? No    Allergies  No Known Allergies    Current Medications    Current Outpatient Medications:   •  aspirin 81 MG EC tablet, Take 81 mg by mouth Daily., Disp: , Rfl:   •  Biotin 5 MG capsule, Take  by mouth Daily., Disp: , Rfl:   •  escitalopram (LEXAPRO) 10 MG tablet, TAKE 1 TABLET BY MOUTH EVERY DAY, Disp: 90 tablet, Rfl: 1  •  lisinopril (PRINIVIL,ZESTRIL) 5 MG tablet, Take 2 tablets by mouth Daily. (Patient taking differently: Take 5 mg by mouth Daily.), Disp: 60 tablet, Rfl: 5  •  Misc Natural Products (OSTEO BI-FLEX TRIPLE STRENGTH PO), Take  by mouth., Disp: , Rfl:   •  Multiple Vitamins-Minerals (CENTRUM SILVER ADULT 50+ PO), Take  by mouth Daily., Disp: , Rfl:   •  naproxen (NAPROSYN) 375 MG tablet, TAKE 1 TABLET BY MOUTH TWICE A DAY WITH FOOD, Disp: 60 tablet, Rfl: 0  •  Omega-3 Fatty Acids (FISH OIL) 1200 MG capsule capsule, Take  by mouth., Disp: , Rfl:   •  propranolol (INDERAL) 10 MG tablet, TAKE 1 TABLET BY MOUTH EVERY DAY, Disp: 90 tablet, Rfl: 2  •  pyridoxine (VITAMIN B-6) 100 MG tablet tablet, Take  by mouth Daily., Disp: , Rfl:   •  vitamin C (ASCORBIC ACID) 500 MG tablet, Take 500 mg by mouth Daily., Disp: , Rfl:   •  VITAMIN D, CHOLECALCIFEROL, PO,  Take  by mouth Daily., Disp: , Rfl:   •  Zinc 50 MG tablet, Take  by mouth Daily., Disp: , Rfl:      Review of System  Review of Systems   HENT: Negative.    Eyes: Negative.    Respiratory: Negative.    Cardiovascular: Negative.    Endocrine: Negative.      I have reviewed and confirmed the accuracy of the ROS as documented by the MA/LPN/RN Tray Padron MD    Vitals:    09/30/22 1148   BP: 112/80   Resp: 16     Body mass index is 28.44 kg/m².    Objective     Physical Exam  Physical Exam  Cardiovascular:      Rate and Rhythm: Normal rate and regular rhythm.      Pulses: Normal pulses.      Heart sounds: Normal heart sounds.   Pulmonary:      Effort: Pulmonary effort is normal.      Breath sounds: Normal breath sounds.   Musculoskeletal:      Cervical back: Normal range of motion.   Neurological:      Mental Status: He is alert.         Assessment & Plan        X-ray done in the emergency room at [facility] showed a comminuted fracture at the base of the fifth metacarpal with intra-articular extension.  The distal fragment demonstrated a radial and dorsal displacement.  Note was made there was a positive ulnar variance and evidence of osteoarthritis of the radiocarpal joint with the left subchondral cystic change of the radial styloid.    Patient underwent TKR approximately 6 weeks or so ago and has been followed by Dr. Chavez orthopedic surgeon as he continues to do physical therapy.    Patient blood pressure is well controlled at 112/80 in the left arm sitting position standard cuff.    Note is made patient is on Naprosyn that he takes for joint pain as well as aspirin he has been taking chronically.  We have asked him to DC the aspirin because of interaction with NSAID and the likelihood of inducing ulcerations in the upper GI tract.  Transferred text    Diagnoses and all orders for this visit:    1. Essential hypertension (Primary)    2. Gastroesophageal reflux disease without esophagitis    3. Chronic right-sided  low back pain with right-sided sciatica    4. Wrist fracture, closed, right, initial encounter      Plan:  1.)  DC aspirin  2.)  Keep follow-up with Dr. Chavez regarding his right knee replacement.  3.)  Follow-up with [facility] orthopedics regarding his right wrist fracture.  4.)  Follow-up in the next several months for Medicare wellness review.       Tray Padron MD  09/30/2022

## 2022-10-05 ENCOUNTER — IMMUNIZATION (OUTPATIENT)
Dept: FAMILY MEDICINE CLINIC | Facility: CLINIC | Age: 72
End: 2022-10-05

## 2022-10-05 DIAGNOSIS — Z23 NEED FOR VACCINATION: Primary | ICD-10-CM

## 2022-10-05 PROCEDURE — 91312 COVID-19 (PFIZER) BIVALENT BOOSTER 12+YRS: CPT | Performed by: INTERNAL MEDICINE

## 2022-10-05 PROCEDURE — 0124A COVID-19 (PFIZER) BIVALENT BOOSTER 12+YRS: CPT | Performed by: INTERNAL MEDICINE

## 2022-10-19 ENCOUNTER — FLU SHOT (OUTPATIENT)
Dept: FAMILY MEDICINE CLINIC | Facility: CLINIC | Age: 72
End: 2022-10-19

## 2022-10-19 DIAGNOSIS — Z23 NEED FOR INFLUENZA VACCINATION: Primary | ICD-10-CM

## 2022-10-19 PROCEDURE — 90662 IIV NO PRSV INCREASED AG IM: CPT | Performed by: INTERNAL MEDICINE

## 2022-10-19 PROCEDURE — G0008 ADMIN INFLUENZA VIRUS VAC: HCPCS | Performed by: INTERNAL MEDICINE

## 2022-11-03 DIAGNOSIS — I10 ESSENTIAL HYPERTENSION: ICD-10-CM

## 2022-11-03 RX ORDER — LISINOPRIL 5 MG/1
TABLET ORAL
Qty: 180 TABLET | Refills: 1 | Status: SHIPPED | OUTPATIENT
Start: 2022-11-03

## 2022-11-07 DIAGNOSIS — F32.9 CURRENT EPISODE OF MAJOR DEPRESSIVE DISORDER WITHOUT PRIOR EPISODE, UNSPECIFIED DEPRESSION EPISODE SEVERITY: ICD-10-CM

## 2022-11-08 RX ORDER — ESCITALOPRAM OXALATE 10 MG/1
TABLET ORAL
Qty: 90 TABLET | Refills: 1 | Status: SHIPPED | OUTPATIENT
Start: 2022-11-08

## 2022-11-10 ENCOUNTER — DOCUMENTATION (OUTPATIENT)
Dept: PHYSICAL THERAPY | Facility: CLINIC | Age: 72
End: 2022-11-10

## 2022-11-10 DIAGNOSIS — M17.11 PRIMARY OSTEOARTHRITIS OF RIGHT KNEE: ICD-10-CM

## 2022-11-10 DIAGNOSIS — G89.29 CHRONIC PAIN OF RIGHT KNEE: Primary | ICD-10-CM

## 2022-11-10 DIAGNOSIS — M25.561 CHRONIC PAIN OF RIGHT KNEE: Primary | ICD-10-CM

## 2022-11-10 DIAGNOSIS — R26.2 DIFFICULTY WALKING: ICD-10-CM

## 2022-11-10 NOTE — PROGRESS NOTES
Discharge Summary  Discharge Summary from Physical Therapy Report  [address], [address], [place] [address]    Patient Information  Saleem Aguilar  1950    Dates  PT visit: 08/23/2022 - 09/29/2022  Number of Visits: 10     Discharge Status of Patient: See final Note dated 11/10/2022    Goals: All Met    Visit Diagnoses:    ICD-10-CM ICD-9-CM   1. Chronic pain of right knee  M25.561 719.46    G89.29 338.29   2. Primary osteoarthritis of right knee  M17.11 715.16   3. Difficulty walking  R26.2 719.7       Discharge Plan: Continue with current home exercise program as instructed    Date of Discharge 11/10/2022        Ting Mujica PT, DPT, OCS  Physical Therapist  [place] #[id number]  Electronically Signed by: Ting Mujica PT, 11/10/22, 8:04 AM EST

## 2022-12-29 ENCOUNTER — OFFICE VISIT (OUTPATIENT)
Dept: FAMILY MEDICINE CLINIC | Facility: CLINIC | Age: 72
End: 2022-12-29
Payer: MEDICARE

## 2022-12-29 VITALS
WEIGHT: 192.6 LBS | BODY MASS INDEX: 28.53 KG/M2 | SYSTOLIC BLOOD PRESSURE: 120 MMHG | RESPIRATION RATE: 16 BRPM | HEIGHT: 69 IN | DIASTOLIC BLOOD PRESSURE: 76 MMHG

## 2022-12-29 DIAGNOSIS — Z13.0 SCREENING FOR DEFICIENCY ANEMIA: Primary | ICD-10-CM

## 2022-12-29 DIAGNOSIS — Z00.00 MEDICARE ANNUAL WELLNESS VISIT, SUBSEQUENT: ICD-10-CM

## 2022-12-29 DIAGNOSIS — R35.1 NOCTURIA: ICD-10-CM

## 2022-12-29 DIAGNOSIS — Z13.220 SCREENING FOR HYPERLIPIDEMIA: ICD-10-CM

## 2022-12-29 PROCEDURE — 1170F FXNL STATUS ASSESSED: CPT | Performed by: INTERNAL MEDICINE

## 2022-12-29 PROCEDURE — 1159F MED LIST DOCD IN RCRD: CPT | Performed by: INTERNAL MEDICINE

## 2022-12-29 PROCEDURE — G0439 PPPS, SUBSEQ VISIT: HCPCS | Performed by: INTERNAL MEDICINE

## 2022-12-29 PROCEDURE — 99999 PR OFFICE/OUTPT VISIT,PROCEDURE ONLY: CPT | Performed by: INTERNAL MEDICINE

## 2022-12-29 NOTE — PROGRESS NOTES
The ABCs of the Annual Wellness Visit  Initial Medicare Wellness Visit    Chief Complaint   Patient presents with   • Medicare Wellness-subsequent     No other issues     Subjective   History of Present Illness:  Saleem Aguilar is a 72 y.o. male who presents for an Initial Medicare Wellness Visit.    The following portions of the patient's history were reviewed and   updated as appropriate: allergies, current medications, past family history, past medical history, past social history, past surgical history and problem list.     Compared to one year ago, the patient feels his physical   health is worse.    Compared to one year ago, the patient feels his mental   health is the same.    Recent Hospitalizations:  He was not admitted to the hospital during the last year.       Current Medical Providers:  Patient Care Team:  Tray Padron MD as PCP - General (Internal Medicine)    Outpatient Medications Prior to Visit   Medication Sig Dispense Refill   • aspirin 81 MG EC tablet Take 81 mg by mouth Daily.     • Biotin 5 MG capsule Take  by mouth Daily.     • escitalopram (LEXAPRO) 10 MG tablet TAKE 1 TABLET BY MOUTH EVERY DAY 90 tablet 1   • lisinopril (PRINIVIL,ZESTRIL) 5 MG tablet TAKE 2 TABLETS BY MOUTH EVERY  tablet 1   • Misc Natural Products (OSTEO BI-FLEX TRIPLE STRENGTH PO) Take  by mouth.     • Multiple Vitamins-Minerals (CENTRUM SILVER ADULT 50+ PO) Take  by mouth Daily.     • naproxen (NAPROSYN) 375 MG tablet TAKE 1 TABLET BY MOUTH TWICE A DAY WITH FOOD 60 tablet 0   • Omega-3 Fatty Acids (FISH OIL) 1200 MG capsule capsule Take  by mouth.     • propranolol (INDERAL) 10 MG tablet TAKE 1 TABLET BY MOUTH EVERY DAY 90 tablet 2   • pyridoxine (VITAMIN B-6) 100 MG tablet tablet Take  by mouth Daily.     • vitamin C (ASCORBIC ACID) 500 MG tablet Take 500 mg by mouth Daily.     • VITAMIN D, CHOLECALCIFEROL, PO Take  by mouth Daily.     • Zinc 50 MG tablet Take  by mouth Daily.       No facility-administered  medications prior to visit.       No opioid medication identified on active medication list. I have reviewed chart for other potential  high risk medication/s and harmful drug interactions in the elderly.          Aspirin is on active medication list. Aspirin use is not indicated based on review of current medical condition/s. Risk of harm outweighs potential benefits. Patient instructed to discontinue this medication.  .      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  ACP discussion was held with the patient during this visit. Patient has an advance directive in EMR which is still valid.     Review of Systems   Constitutional: Negative.    HENT: Negative.    Eyes: Negative.    Respiratory: Negative.    Cardiovascular: Negative.    Gastrointestinal: Negative.    Endocrine: Negative.    Genitourinary: Negative.    Musculoskeletal: Negative.    Skin: Negative.    Allergic/Immunologic: Negative.    Neurological: Negative.    Hematological: Negative.    Psychiatric/Behavioral: Negative.         Objective       Vitals:    12/29/22 1035   BP: 120/76   Resp: 16   Weight: 87.4 kg (192 lb 9.6 oz)   Height: 175.3 cm (69\")     Estimated body mass index is 28.44 kg/m² as calculated from the following:    Height as of this encounter: 175.3 cm  (69\").    Weight as of this encounter: 87.4 kg (192 lb 9.6 oz).    BMI is >= 25 and <30. (Overweight) The following options were offered after discussion;: none (medical contraindication)      Does the patient have evidence of cognitive impairment? No    Physical Exam  Constitutional:       Appearance: Normal appearance.   HENT:      Head: Normocephalic and atraumatic.   Cardiovascular:      Rate and Rhythm: Normal rate and regular rhythm.      Pulses: Normal pulses.      Heart sounds: Normal heart sounds.   Pulmonary:      Effort: Pulmonary effort is normal.      Breath sounds: Normal breath sounds.   Musculoskeletal:         General: Normal range of motion.      Cervical back: Normal range of motion and neck supple.   Skin:     General: Skin is warm.   Neurological:      General: No focal deficit present.      Mental Status: He is alert.       Lab Results   Component Value Date    CHLPL 151 2022    TRIG 77 2022    HDL 52 2022    LDL 84 2022    VLDL 15 2022          HEALTH RISK ASSESSMENT    Smoking Status:  Social History     Tobacco Use   Smoking Status Former   • Packs/day: 0.50   • Years: 5.00   • Pack years: 2.50   • Types: Cigarettes   • Quit date:    • Years since quittin.0   Smokeless Tobacco Never     Alcohol Consumption:  Social History     Substance and Sexual Activity   Alcohol Use Never     Fall Risk Screen:    RUPERTO Fall Risk Assessment has not been completed.    Depression Screen:   PHQ-2/PHQ-9 Depression Screening 2022   Retired PHQ-9 Total Score -   Retired Total Score -   Little Interest or Pleasure in Doing Things 0-->not at all   Feeling Down, Depressed or Hopeless 1-->several days   PHQ-9: Brief Depression Severity Measure Score 1       Health Habits and Functional and Cognitive Screening:  Functional & Cognitive Status 2022   Do you have difficulty preparing food and eating? No   Do you have difficulty bathing yourself, getting dressed or  grooming yourself? No   Do you have difficulty using the toilet? No   Do you have difficulty moving around from place to place? No   Do you have trouble with steps or getting out of a bed or a chair? No   Current Diet -   Dental Exam Up to date   Eye Exam Up to date   Exercise (times per week) -   Current Exercises Include Walking   Current Exercise Activities Include -   Do you need help using the phone?  No   Are you deaf or do you have serious difficulty hearing?  Yes   Do you need help with transportation? No   Do you need help shopping? No   Do you need help preparing meals?  No   Do you need help with housework?  No   Do you need help with laundry? No   Do you need help taking your medications? No   Do you need help managing money? No   Do you ever drive or ride in a car without wearing a seat belt? No   Have you felt unusual stress, anger or loneliness in the last month? No   Who do you live with? Spouse   If you need help, do you have trouble finding someone available to you? No   Have you been bothered in the last four weeks by sexual problems? No   Do you have difficulty concentrating, remembering or making decisions? Yes       Age-appropriate Screening Schedule:  Refer to the list below for future screening recommendations based on patient's age, sex and/or medical conditions. Orders for these recommended tests are listed in the plan section. The patient has been provided with a written plan.    Health Maintenance   Topic Date Due   • ZOSTER VACCINE (1 of 2) 07/11/2023 (Originally 6/2/2000)   • LIPID PANEL  12/29/2023   • TDAP/TD VACCINES (2 - Td or Tdap) 04/06/2028   • INFLUENZA VACCINE  Completed            Assessment & Plan    This 72-year-old patient presents at this time for Medicare wellness review.  He relates he is feeling fine having had no problems in the interim of visits.  His last visit with us was on 9/30/2022 his blood pressure is 112/80 at that time today it is 120/76 in the left arm  sitting position standard cuff when rechecked by me.  With his last visit we discontinued his aspirin and the lisinopril because of his excellent blood pressure.    Regarding anticipatory guidance.  We discussed the importance of keeping his cholesterol less than 100 from the LDL perspective.  We gave him a low-cholesterol diet sheet and reviewed this with him.    CMS Preventative Services Quick Reference  Risk Factors Identified During Encounter  None Identified  The above risks/problems have been discussed with the patient.  Follow up actions/plans if indicated are seen below in the Assessment/Plan Section.  Pertinent information has been shared with the patient in the After Visit Summary.    Diagnoses and all orders for this visit:    1. Screening for deficiency anemia (Primary)  -     CBC & Differential    2. Screening for hyperlipidemia  -     Lipid Panel With / Chol / HDL Ratio    3. Nocturia  -     Comprehensive Metabolic Panel  -     Urinalysis With Culture If Indicated -    4. Medicare annual wellness visit, subsequent    Other orders  -     Request Problem        Follow Up:  Return in about 5 months (around 5/29/2023).     An After Visit Summary and PPPS were made available to the patient.                 Plan:  1.)  Follow-up in about 5 to 6 months  2.)  Comprehensive medical profile, CBC, lipid profile

## 2023-01-03 LAB
ALBUMIN SERPL-MCNC: NORMAL G/DL
ALP SERPL-CCNC: NORMAL U/L
ALT SERPL-CCNC: NORMAL U/L
AST SERPL-CCNC: NORMAL U/L
BASOPHILS # BLD AUTO: 0.06 10*3/MM3 (ref 0–0.2)
BASOPHILS NFR BLD AUTO: 1.6 % (ref 0–1.5)
BILIRUB SERPL-MCNC: NORMAL MG/DL
BUN SERPL-MCNC: NORMAL MG/DL
CALCIUM SERPL-MCNC: NORMAL MG/DL
CHLORIDE SERPL-SCNC: NORMAL MMOL/L
CHOLEST SERPL-MCNC: 151 MG/DL (ref 0–200)
CHOLEST/HDLC SERPL: 2.9 {RATIO}
CO2 SERPL-SCNC: NORMAL MMOL/L
CREAT SERPL-MCNC: NORMAL MG/DL
EOSINOPHIL # BLD AUTO: 0.09 10*3/MM3 (ref 0–0.4)
EOSINOPHIL NFR BLD AUTO: 2.5 % (ref 0.3–6.2)
ERYTHROCYTE [DISTWIDTH] IN BLOOD BY AUTOMATED COUNT: 13.1 % (ref 12.3–15.4)
GLUCOSE SERPL-MCNC: NORMAL MG/DL
HCT VFR BLD AUTO: 44.5 % (ref 37.5–51)
HDLC SERPL-MCNC: 52 MG/DL (ref 40–60)
HGB BLD-MCNC: 13.5 G/DL (ref 13–17.7)
IMM GRANULOCYTES # BLD AUTO: 0.01 10*3/MM3 (ref 0–0.05)
IMM GRANULOCYTES NFR BLD AUTO: 0.3 % (ref 0–0.5)
LDLC SERPL CALC-MCNC: 84 MG/DL (ref 0–100)
LYMPHOCYTES # BLD AUTO: 1.49 10*3/MM3 (ref 0.7–3.1)
LYMPHOCYTES NFR BLD AUTO: 40.8 % (ref 19.6–45.3)
MCH RBC QN AUTO: 26.8 PG (ref 26.6–33)
MCHC RBC AUTO-ENTMCNC: 30.3 G/DL (ref 31.5–35.7)
MCV RBC AUTO: 88.5 FL (ref 79–97)
MONOCYTES # BLD AUTO: 0.37 10*3/MM3 (ref 0.1–0.9)
MONOCYTES NFR BLD AUTO: 10.1 % (ref 5–12)
NEUTROPHILS # BLD AUTO: 1.63 10*3/MM3 (ref 1.7–7)
NEUTROPHILS NFR BLD AUTO: 44.7 % (ref 42.7–76)
NRBC BLD AUTO-RTO: 0 /100 WBC (ref 0–0.2)
PLATELET # BLD AUTO: 282 10*3/MM3 (ref 140–450)
POTASSIUM SERPL-SCNC: NORMAL MMOL/L
PROT SERPL-MCNC: NORMAL G/DL
RBC # BLD AUTO: 5.03 10*6/MM3 (ref 4.14–5.8)
REQUEST PROBLEM: NORMAL
SODIUM SERPL-SCNC: NORMAL MMOL/L
TRIGL SERPL-MCNC: 77 MG/DL (ref 0–150)
VLDLC SERPL CALC-MCNC: 15 MG/DL (ref 5–40)
WBC # BLD AUTO: 3.65 10*3/MM3 (ref 3.4–10.8)

## 2023-05-08 DIAGNOSIS — F32.9 CURRENT EPISODE OF MAJOR DEPRESSIVE DISORDER WITHOUT PRIOR EPISODE, UNSPECIFIED DEPRESSION EPISODE SEVERITY: ICD-10-CM

## 2023-05-08 RX ORDER — ESCITALOPRAM OXALATE 10 MG/1
TABLET ORAL
Qty: 90 TABLET | Refills: 1 | Status: SHIPPED | OUTPATIENT
Start: 2023-05-08

## 2023-05-08 RX ORDER — PROPRANOLOL HYDROCHLORIDE 10 MG/1
TABLET ORAL
Qty: 90 TABLET | Refills: 2 | Status: SHIPPED | OUTPATIENT
Start: 2023-05-08

## 2023-07-31 ENCOUNTER — OFFICE VISIT (OUTPATIENT)
Dept: FAMILY MEDICINE CLINIC | Facility: CLINIC | Age: 73
End: 2023-07-31
Payer: MEDICARE

## 2023-07-31 VITALS
RESPIRATION RATE: 16 BRPM | HEART RATE: 63 BPM | DIASTOLIC BLOOD PRESSURE: 98 MMHG | BODY MASS INDEX: 27.85 KG/M2 | WEIGHT: 188 LBS | SYSTOLIC BLOOD PRESSURE: 152 MMHG | HEIGHT: 69 IN | OXYGEN SATURATION: 98 % | TEMPERATURE: 97.6 F

## 2023-07-31 DIAGNOSIS — K30 INDIGESTION: ICD-10-CM

## 2023-07-31 DIAGNOSIS — J01.10 ACUTE NON-RECURRENT FRONTAL SINUSITIS: ICD-10-CM

## 2023-07-31 DIAGNOSIS — R35.0 FREQUENT URINATION: Primary | ICD-10-CM

## 2023-07-31 PROCEDURE — 1159F MED LIST DOCD IN RCRD: CPT | Performed by: INTERNAL MEDICINE

## 2023-07-31 PROCEDURE — 1160F RVW MEDS BY RX/DR IN RCRD: CPT | Performed by: INTERNAL MEDICINE

## 2023-07-31 PROCEDURE — 3080F DIAST BP >= 90 MM HG: CPT | Performed by: INTERNAL MEDICINE

## 2023-07-31 PROCEDURE — 99214 OFFICE O/P EST MOD 30 MIN: CPT | Performed by: INTERNAL MEDICINE

## 2023-07-31 PROCEDURE — 3077F SYST BP >= 140 MM HG: CPT | Performed by: INTERNAL MEDICINE

## 2023-07-31 RX ORDER — FAMOTIDINE 20 MG/1
20 TABLET, FILM COATED ORAL 2 TIMES DAILY
Qty: 40 TABLET | Refills: 1 | Status: SHIPPED | OUTPATIENT
Start: 2023-07-31

## 2023-07-31 RX ORDER — AZITHROMYCIN 250 MG/1
TABLET, FILM COATED ORAL
Qty: 6 TABLET | Refills: 0 | Status: SHIPPED | OUTPATIENT
Start: 2023-07-31

## 2023-07-31 RX ORDER — FEXOFENADINE HCL 180 MG/1
180 TABLET ORAL DAILY
Qty: 30 TABLET | Refills: 1 | Status: SHIPPED | OUTPATIENT
Start: 2023-07-31

## 2023-07-31 NOTE — PROGRESS NOTES
07/31/2023    Assessment & Plan   This 73-year-old patient presents today with complaint of his stomach growling for the past several days.  He relates his there is no overt pain or discomfort but he is concerned about the growling.  He relates it over the past several days he is cut back on his food intake for instance he had a piece of toast this morning and what does not plan needed thing else until this afternoon.  He had no nausea vomiting constipation or diarrhea.    Patient also complains of a headache x1 week along the top part of his eyes he relates.  He relates the pain is dull lasting for several hours.  He has no tenderness to percussion over the maxillary or other sinuses.  File at the end of the visit the patient relates that he is getting concerned because he has had frequent urination for the past 2 days.  He denies any chills or fever and no pain with urination.  Diagnoses and all orders for this visit:    1. Frequent urination (Primary)  -     POC Blood, Urine, Qualitative, Dipstick  -     Basic metabolic panel; Future    2. Acute non-recurrent frontal sinusitis    3. Indigestion    Other orders  -     fexofenadine (Allegra Allergy) 180 MG tablet; Take 1 tablet by mouth Daily.  Dispense: 30 tablet; Refill: 1  -     azithromycin (Zithromax Z-Meño) 250 MG tablet; Take 2 tablets by mouth on day 1, then 1 tablet daily on days 2-5  Dispense: 6 tablet; Refill: 0  -     famotidine (Pepcid) 20 MG tablet; Take 1 tablet by mouth 2 (Two) Times a Day.  Dispense: 40 tablet; Refill: 1    Plan:  1.)  Zithromycin and Allegra for acute sinusitis.  2.)  Pepcid 1 tab p.o. twice daily for GI distress.  3.)  Patient will give us an update in the next several days.         CC: Headache and Abdominal Pain  .        HPI  Headache  Abdominal Pain  Associated symptoms include headaches.      Subjective   Saleem Aguilar is a 73 y.o. male.      The following portions of the patient's history were reviewed and updated as  appropriate: allergies, current medications, past family history, past medical history, past social history, past surgical history, and problem list.    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, left    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, right    Essential hypertension, benign    Disease of tongue    Gastroesophageal reflux disease    Right shoulder pain    Tremor    Abnormal electrocardiogram    Acute chest pain    Benign essential tremor    Carpal tunnel syndrome, bilateral    Hypertension    Inguinal hernia    Memory loss    PVC (premature ventricular contraction)    Screening for colon cancer       Past Medical History  Past Medical History:   Diagnosis Date    Benign positional vertigo 6/26/2019    Cancer     COPD (chronic obstructive pulmonary disease) 7/23/2018    Disease of tongue 7/17/2020    Essential hypertension, benign 7/9/2012    Gastroesophageal reflux disease 7/17/2020    Major depression, single episode 12/19/2019    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, right 1/3/2017    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 No pertinent family history.    Social History  Social History    Tobacco Use      Smoking status: Former        Packs/day: 0.50        Years: 5.00        Pack  years: 2.5        Types: Cigarettes        Quit date:         Years since quittin.6        Passive exposure: Past      Smokeless tobacco: Never       Is the Patient a current tobacco user? No    Allergies  No Known Allergies    Current Medications    Current Outpatient Medications:     aspirin 81 MG EC tablet, Take 1 tablet by mouth Daily., Disp: , Rfl:     Biotin 5 MG capsule, Take  by mouth Daily., Disp: , Rfl:     escitalopram (LEXAPRO) 10 MG tablet, TAKE 1 TABLET BY MOUTH EVERY DAY, Disp: 90 tablet, Rfl: 1    lisinopril (PRINIVIL,ZESTRIL) 5 MG tablet, TAKE 2 TABLETS BY MOUTH EVERY DAY, Disp: 180 tablet, Rfl: 1    Misc Natural Products (OSTEO BI-FLEX TRIPLE STRENGTH PO), Take  by mouth., Disp: , Rfl:     Multiple Vitamins-Minerals (CENTRUM SILVER ADULT 50+ PO), Take  by mouth Daily., Disp: , Rfl:     naproxen (NAPROSYN) 375 MG tablet, TAKE 1 TABLET BY MOUTH TWICE A DAY WITH FOOD, Disp: 60 tablet, Rfl: 0    Omega-3 Fatty Acids (FISH OIL) 1200 MG capsule capsule, Take  by mouth., Disp: , Rfl:     propranolol (INDERAL) 10 MG tablet, TAKE 1 TABLET BY MOUTH EVERY DAY, Disp: 90 tablet, Rfl: 2    pyridoxine (VITAMIN B-6) 100 MG tablet tablet, Take  by mouth Daily., Disp: , Rfl:     vitamin C (ASCORBIC ACID) 500 MG tablet, Take 1 tablet by mouth Daily., Disp: , Rfl:     VITAMIN D, CHOLECALCIFEROL, PO, Take  by mouth Daily., Disp: , Rfl:     Zinc 50 MG tablet, Take  by mouth Daily., Disp: , Rfl:     azithromycin (Zithromax Z-Meño) 250 MG tablet, Take 2 tablets by mouth on day 1, then 1 tablet daily on days 2-5, Disp: 6 tablet, Rfl: 0    famotidine (Pepcid) 20 MG tablet, Take 1 tablet by mouth 2 (Two) Times a Day., Disp: 40 tablet, Rfl: 1    fexofenadine (Allegra Allergy) 180 MG tablet, Take 1 tablet by mouth Daily., Disp: 30 tablet, Rfl: 1     Review of System  Review of Systems   Gastrointestinal:  Positive for abdominal pain.   I have reviewed and confirmed the accuracy of the ROS as documented by the MA/LPN/RN  Tray Padron MD    Vitals:    07/31/23 1127   BP: 152/98   Pulse: 63   Resp: 16   Temp: 97.6 øF (36.4 øC)   SpO2: 98%     Body mass index is 27.75 kg/mý.    Objective     Physical Exam  Physical Exam  Constitutional:       General: He is not in acute distress.     Appearance: Normal appearance.   HENT:      Head: Normocephalic and atraumatic.   Cardiovascular:      Rate and Rhythm: Normal rate and regular rhythm.   Pulmonary:      Effort: Pulmonary effort is normal. No respiratory distress.      Breath sounds: Normal breath sounds. No wheezing, rhonchi or rales.   Neurological:      General: No focal deficit present.      Mental Status: He is alert and oriented to person, place, and time.   Psychiatric:         Mood and Affect: Mood normal.         Behavior: Behavior normal.         Thought Content: Thought content normal.         Judgment: Judgment normal.           Tray Padron MD  07/31/2023

## 2023-08-02 ENCOUNTER — TELEPHONE (OUTPATIENT)
Dept: FAMILY MEDICINE CLINIC | Facility: CLINIC | Age: 73
End: 2023-08-02

## 2023-08-02 NOTE — TELEPHONE ENCOUNTER
Caller: Saleem Aguilar    Relationship: Self    Best call back number: [phone]     Who are you requesting to speak with (clinical staff, provider,  specific staff member): CLINICAL STAFF     What was the call regarding: STATES HAVING SWELLING UNDER LEFT EYE ON FACE AND THINKS IT HAS SOMETHING TO DO WITH THE 3 MEDICATION PRESCRIBED TO HIM AT LAST VISIT PLEASE CALL AND ADVISE

## 2023-09-25 RX ORDER — FEXOFENADINE HCL 180 MG/1
TABLET ORAL
Qty: 30 TABLET | Refills: 1 | Status: SHIPPED | OUTPATIENT
Start: 2023-09-25

## 2023-11-11 DIAGNOSIS — F32.9 CURRENT EPISODE OF MAJOR DEPRESSIVE DISORDER WITHOUT PRIOR EPISODE, UNSPECIFIED DEPRESSION EPISODE SEVERITY: ICD-10-CM

## 2023-11-13 RX ORDER — ESCITALOPRAM OXALATE 10 MG/1
TABLET ORAL
Qty: 90 TABLET | Refills: 1 | Status: SHIPPED | OUTPATIENT
Start: 2023-11-13

## 2023-11-16 ENCOUNTER — TELEPHONE (OUTPATIENT)
Dept: FAMILY MEDICINE CLINIC | Facility: CLINIC | Age: 73
End: 2023-11-16

## 2023-11-16 NOTE — TELEPHONE ENCOUNTER
Hub staff attempted to follow warm transfer process and was unsuccessful     Caller: Saleem Aguilar    Relationship to patient: Self    Best call back number: [phone]     Patient is needing: RECEIVED A NOTICE HE HAS OVERDUE LABS, PATIENT CALLING TO SCHEDULE.

## 2023-11-29 RX ORDER — FEXOFENADINE HCL 180 MG/1
TABLET ORAL
Qty: 30 TABLET | Refills: 1 | Status: SHIPPED | OUTPATIENT
Start: 2023-11-29

## 2023-12-18 ENCOUNTER — OFFICE VISIT (OUTPATIENT)
Dept: FAMILY MEDICINE CLINIC | Facility: CLINIC | Age: 73
End: 2023-12-18
Payer: MEDICARE

## 2023-12-18 VITALS
DIASTOLIC BLOOD PRESSURE: 80 MMHG | SYSTOLIC BLOOD PRESSURE: 130 MMHG | HEIGHT: 69 IN | WEIGHT: 191 LBS | TEMPERATURE: 98 F | BODY MASS INDEX: 28.29 KG/M2

## 2023-12-18 DIAGNOSIS — T50.905A ALLERGIC REACTION DUE TO CORRECT MEDICINAL SUBSTANCE PROPERLY ADMINISTERED: ICD-10-CM

## 2023-12-18 DIAGNOSIS — I10 PRIMARY HYPERTENSION: ICD-10-CM

## 2023-12-18 DIAGNOSIS — R05.9 COUGH, UNSPECIFIED TYPE: Primary | ICD-10-CM

## 2023-12-18 LAB
EXPIRATION DATE: NORMAL
FLUAV AG UPPER RESP QL IA.RAPID: NOT DETECTED
FLUBV AG UPPER RESP QL IA.RAPID: NOT DETECTED
INTERNAL CONTROL: NORMAL
Lab: NORMAL
SARS-COV-2 AG UPPER RESP QL IA.RAPID: NOT DETECTED

## 2023-12-18 RX ORDER — FEXOFENADINE HCL 180 MG/1
180 TABLET ORAL DAILY
Qty: 30 TABLET | Refills: 2 | Status: SHIPPED | OUTPATIENT
Start: 2023-12-18

## 2023-12-18 NOTE — PROGRESS NOTES
12/18/2023    Assessment & Plan   .  This 73-year-old patient presents at this time for a same-day appointment for evaluation of cough he relates that it is mostly dry has been present for about 3 to 4 days.  He has had no chills or fever.    His blood pressure was well-controlled at 130/80 in the left arm sitting position standard cuff.  He has a history of hypertension and is currently on lisinopril 5 mg p.o. every morning.  He relates that he feels like there is a tickling in his throat and like there is something in his throat that he cannot swallow or get rid of.    There is no tenderness to palpation over the sinuses.    He has been on the lisinopril for several years.  We discussed with him that this could be early manifestation of an ACE related cough.  Review of his record shows that he has never been on amlodipine so we will switch to amlodipine rather than a angiotensin blocker.        Diagnoses and all orders for this visit:    1. Cough, unspecified type (Primary)  -     POCT SARS-CoV-2 + Flu Antigen KENNEDY      BMI is >= 25 and <30. (Overweight) The following options were offered after discussion;: nutrition counseling/recommendations    Plan:  1.)  DC lisinopril  2.)  Amlodipine 5 mg 1 tab p.o. every morning  3.)  Follow-up in the next few weeks as scheduled for visit.  4.)  Fexofenadine 180 mg 1 tab p.o. daily for sinusitis.       CC: Cough (Mostly dry cough.  Started 3-4 days ago.  No other symptoms---no other issues)  .        HPI  History of Present Illness     Subjective   Saleem Aguilar is a 73 y.o. male.      The following portions of the patient's history were reviewed and updated as appropriate: allergies, current medications, past family history, past medical history, past social history, past surgical history, and problem list.    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, left    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, right    Essential hypertension, benign    Disease of tongue    Gastroesophageal reflux disease    Right shoulder pain    Tremor    Abnormal electrocardiogram    Acute chest pain    Benign essential tremor    Carpal tunnel syndrome, bilateral    Hypertension    Inguinal hernia    Memory loss    PVC (premature ventricular contraction)    Screening for colon cancer       Past Medical History  Past Medical History:   Diagnosis Date    Benign positional vertigo 2019    Cancer     COPD (chronic obstructive pulmonary disease) 2018    Disease of tongue 2020    Essential hypertension, benign 2012    Gastroesophageal reflux disease 2020    Major depression, single episode 2019    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, right 1/3/2017    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 No pertinent family history.    Social History  Social History    Tobacco Use      Smoking status: Former        Packs/day: 0.50        Years: 5.00        Additional pack years: 0.00        Total pack years: 2.50        Types: Cigarettes        Quit date:         Years since quittin.9        Passive exposure: Past      Smokeless tobacco: Never       Is the Patient a current tobacco user? No    Allergies  No Known Allergies    Current Medications    Current Outpatient  Medications:     Biotin 5 MG capsule, Take  by mouth Daily., Disp: , Rfl:     escitalopram (LEXAPRO) 10 MG tablet, TAKE 1 TABLET BY MOUTH EVERY DAY, Disp: 90 tablet, Rfl: 1    fexofenadine (ALLEGRA) 180 MG tablet, TAKE 1 TABLET BY MOUTH EVERY DAY, Disp: 30 tablet, Rfl: 1    lisinopril (PRINIVIL,ZESTRIL) 5 MG tablet, TAKE 2 TABLETS BY MOUTH EVERY DAY (Patient taking differently: Take 0.5 tablets by mouth Daily.), Disp: 180 tablet, Rfl: 1    Misc Natural Products (OSTEO BI-FLEX TRIPLE STRENGTH PO), Take  by mouth., Disp: , Rfl:     Multiple Vitamins-Minerals (CENTRUM SILVER ADULT 50+ PO), Take  by mouth Daily., Disp: , Rfl:     naproxen (NAPROSYN) 375 MG tablet, TAKE 1 TABLET BY MOUTH TWICE A DAY WITH FOOD, Disp: 60 tablet, Rfl: 0    Omega-3 Fatty Acids (FISH OIL) 1200 MG capsule capsule, Take  by mouth., Disp: , Rfl:     propranolol (INDERAL) 10 MG tablet, TAKE 1 TABLET BY MOUTH EVERY DAY, Disp: 90 tablet, Rfl: 2    pyridoxine (VITAMIN B-6) 100 MG tablet tablet, Take  by mouth Daily., Disp: , Rfl:     vitamin C (ASCORBIC ACID) 500 MG tablet, Take 1 tablet by mouth Daily., Disp: , Rfl:     VITAMIN D, CHOLECALCIFEROL, PO, Take  by mouth Daily., Disp: , Rfl:     Zinc 50 MG tablet, Take  by mouth Daily., Disp: , Rfl:     aspirin 81 MG EC tablet, Take 1 tablet by mouth Daily. (Patient not taking: Reported on 12/18/2023), Disp: , Rfl:     azithromycin (Zithromax Z-Meño) 250 MG tablet, Take 2 tablets by mouth on day 1, then 1 tablet daily on days 2-5 (Patient not taking: Reported on 12/18/2023), Disp: 6 tablet, Rfl: 0    famotidine (Pepcid) 20 MG tablet, Take 1 tablet by mouth 2 (Two) Times a Day. (Patient not taking: Reported on 12/18/2023), Disp: 40 tablet, Rfl: 1     Review of System  Review of Systems   Constitutional:  Negative for chills and fever.   Respiratory:  Negative for cough and shortness of breath.    Cardiovascular:  Negative for chest pain and palpitations.   Gastrointestinal:  Negative for constipation,  diarrhea, nausea and vomiting.   Neurological:  Negative for dizziness and headache.     I have reviewed and confirmed the accuracy of the ROS as documented by the MA/LPN/RN Tray Padron MD    Vitals:    12/18/23 1100   BP: 130/80   Temp: 98 °F (36.7 °C)     Body mass index is 28.21 kg/m².    Objective     Physical Exam  Physical Exam  Constitutional:       General: He is not in acute distress.     Appearance: Normal appearance.   HENT:      Head: Normocephalic and atraumatic.   Cardiovascular:      Rate and Rhythm: Normal rate and regular rhythm.   Pulmonary:      Effort: Pulmonary effort is normal. No respiratory distress.      Breath sounds: Normal breath sounds. No wheezing, rhonchi or rales.   Neurological:      General: No focal deficit present.      Mental Status: He is alert and oriented to person, place, and time.   Psychiatric:         Mood and Affect: Mood normal.         Behavior: Behavior normal.         Thought Content: Thought content normal.         Judgment: Judgment normal.             Tray Padron MD  12/18/2023

## 2023-12-20 ENCOUNTER — TELEPHONE (OUTPATIENT)
Dept: FAMILY MEDICINE CLINIC | Facility: CLINIC | Age: 73
End: 2023-12-20

## 2023-12-20 NOTE — TELEPHONE ENCOUNTER
Caller: Saleem Aguilar    Relationship to patient: Self    Best call back number: [phone]     Patient is needing: PATIENT WAS SEEN 12/18/23 AND WAS GIVEN A NEW PRESCRIPTION CALLED IN TO [facility]. WHEN HE CHECKED WITH [facility] IT IS THE SAME FEXOFENADINE THAT HE IS ALREADY TAKING. PATIENT THOUGHT IS WAS SOMETHING DIFFERENT TO HELP ALLEVIATE HIS SYMPTOMS. PLEASE CALL AND ADVISE.

## 2023-12-22 RX ORDER — PROPRANOLOL HYDROCHLORIDE 10 MG/1
TABLET ORAL
Qty: 90 TABLET | Refills: 2 | Status: SHIPPED | OUTPATIENT
Start: 2023-12-22

## 2023-12-22 RX ORDER — AMLODIPINE BESYLATE 5 MG/1
5 TABLET ORAL DAILY
Qty: 30 TABLET | Refills: 2 | Status: SHIPPED | OUTPATIENT
Start: 2023-12-22

## 2024-01-03 ENCOUNTER — HOSPITAL ENCOUNTER (EMERGENCY)
Facility: HOSPITAL | Age: 74
Discharge: HOME | End: 2024-01-04
Attending: EMERGENCY MEDICINE | Admitting: EMERGENCY MEDICINE
Payer: MEDICARE

## 2024-01-03 ENCOUNTER — APPOINTMENT (OUTPATIENT)
Dept: GENERAL RADIOLOGY | Facility: HOSPITAL | Age: 74
End: 2024-01-03
Payer: MEDICARE

## 2024-01-03 VITALS
HEIGHT: 69 IN | OXYGEN SATURATION: 96 % | RESPIRATION RATE: 18 BRPM | HEART RATE: 97 BPM | TEMPERATURE: 100.8 F | WEIGHT: 180 LBS | SYSTOLIC BLOOD PRESSURE: 163 MMHG | BODY MASS INDEX: 26.66 KG/M2 | DIASTOLIC BLOOD PRESSURE: 89 MMHG

## 2024-01-03 DIAGNOSIS — R05.4 COUGH SYNCOPE: ICD-10-CM

## 2024-01-03 DIAGNOSIS — U07.1 COVID-19: Primary | ICD-10-CM

## 2024-01-03 DIAGNOSIS — R55 COUGH SYNCOPE: ICD-10-CM

## 2024-01-03 LAB
ALBUMIN SERPL-MCNC: 4.1 G/DL (ref 3.5–5.2)
ALBUMIN/GLOB SERPL: 1.5 G/DL
ALP SERPL-CCNC: 97 U/L (ref 39–117)
ALT SERPL W P-5'-P-CCNC: 21 U/L (ref 1–41)
ANION GAP SERPL CALCULATED.3IONS-SCNC: 9 MMOL/L (ref 5–15)
AST SERPL-CCNC: 24 U/L (ref 1–40)
BASOPHILS # BLD AUTO: 0.03 10*3/MM3 (ref 0–0.2)
BASOPHILS NFR BLD AUTO: 0.6 % (ref 0–1.5)
BILIRUB SERPL-MCNC: 0.4 MG/DL (ref 0–1.2)
BUN SERPL-MCNC: 10 MG/DL (ref 8–23)
BUN/CREAT SERPL: 10.1 (ref 7–25)
CALCIUM SPEC-SCNC: 9.8 MG/DL (ref 8.6–10.5)
CHLORIDE SERPL-SCNC: 104 MMOL/L (ref 98–107)
CO2 SERPL-SCNC: 23 MMOL/L (ref 22–29)
CREAT SERPL-MCNC: 0.99 MG/DL (ref 0.76–1.27)
DEPRECATED RDW RBC AUTO: 39.5 FL (ref 37–54)
EGFRCR SERPLBLD CKD-EPI 2021: 80.4 ML/MIN/1.73
EOSINOPHIL # BLD AUTO: 0.05 10*3/MM3 (ref 0–0.4)
EOSINOPHIL NFR BLD AUTO: 0.9 % (ref 0.3–6.2)
ERYTHROCYTE [DISTWIDTH] IN BLOOD BY AUTOMATED COUNT: 13 % (ref 12.3–15.4)
GLOBULIN UR ELPH-MCNC: 2.8 GM/DL
GLUCOSE SERPL-MCNC: 111 MG/DL (ref 65–99)
HCT VFR BLD AUTO: 40.1 % (ref 37.5–51)
HGB BLD-MCNC: 12.8 G/DL (ref 13–17.7)
IMM GRANULOCYTES # BLD AUTO: 0.01 10*3/MM3 (ref 0–0.05)
IMM GRANULOCYTES NFR BLD AUTO: 0.2 % (ref 0–0.5)
LYMPHOCYTES # BLD AUTO: 0.84 10*3/MM3 (ref 0.7–3.1)
LYMPHOCYTES NFR BLD AUTO: 15.4 % (ref 19.6–45.3)
MCH RBC QN AUTO: 26.7 PG (ref 26.6–33)
MCHC RBC AUTO-ENTMCNC: 31.9 G/DL (ref 31.5–35.7)
MCV RBC AUTO: 83.5 FL (ref 79–97)
MONOCYTES # BLD AUTO: 0.58 10*3/MM3 (ref 0.1–0.9)
MONOCYTES NFR BLD AUTO: 10.6 % (ref 5–12)
NEUTROPHILS NFR BLD AUTO: 3.94 10*3/MM3 (ref 1.7–7)
NEUTROPHILS NFR BLD AUTO: 72.3 % (ref 42.7–76)
NRBC BLD AUTO-RTO: 0 /100 WBC (ref 0–0.2)
NT-PROBNP SERPL-MCNC: <36 PG/ML (ref 0–900)
PLATELET # BLD AUTO: 238 10*3/MM3 (ref 140–450)
PMV BLD AUTO: 8.4 FL (ref 6–12)
POTASSIUM SERPL-SCNC: 3.8 MMOL/L (ref 3.5–5.2)
PROT SERPL-MCNC: 6.9 G/DL (ref 6–8.5)
RBC # BLD AUTO: 4.8 10*6/MM3 (ref 4.14–5.8)
SODIUM SERPL-SCNC: 136 MMOL/L (ref 136–145)
TROPONIN T SERPL HS-MCNC: 9 NG/L
WBC NRBC COR # BLD AUTO: 5.45 10*3/MM3 (ref 3.4–10.8)

## 2024-01-03 PROCEDURE — 93005 ELECTROCARDIOGRAM TRACING: CPT

## 2024-01-03 PROCEDURE — 0202U NFCT DS 22 TRGT SARS-COV-2: CPT | Performed by: EMERGENCY MEDICINE

## 2024-01-03 PROCEDURE — 83880 ASSAY OF NATRIURETIC PEPTIDE: CPT

## 2024-01-03 PROCEDURE — 84484 ASSAY OF TROPONIN QUANT: CPT

## 2024-01-03 PROCEDURE — 80053 COMPREHEN METABOLIC PANEL: CPT

## 2024-01-03 PROCEDURE — 36415 COLL VENOUS BLD VENIPUNCTURE: CPT

## 2024-01-03 PROCEDURE — 71045 X-RAY EXAM CHEST 1 VIEW: CPT

## 2024-01-03 PROCEDURE — 85025 COMPLETE CBC W/AUTO DIFF WBC: CPT

## 2024-01-03 PROCEDURE — 99284 EMERGENCY DEPT VISIT MOD MDM: CPT

## 2024-01-03 RX ORDER — ACETAMINOPHEN 500 MG
1000 TABLET ORAL ONCE
Status: COMPLETED | OUTPATIENT
Start: 2024-01-03 | End: 2024-01-03

## 2024-01-03 RX ORDER — SODIUM CHLORIDE 0.9 % (FLUSH) 0.9 %
10 SYRINGE (ML) INJECTION AS NEEDED
Status: DISCONTINUED | OUTPATIENT
Start: 2024-01-03 | End: 2024-01-04 | Stop reason: HOSPADM

## 2024-01-03 RX ORDER — CODEINE PHOSPHATE/GUAIFENESIN 10-100MG/5
10 LIQUID (ML) ORAL ONCE
Status: COMPLETED | OUTPATIENT
Start: 2024-01-03 | End: 2024-01-03

## 2024-01-03 RX ADMIN — GUAIFENESIN AND CODEINE PHOSPHATE 10 ML: 100; 10 SOLUTION ORAL at 23:40

## 2024-01-03 RX ADMIN — ACETAMINOPHEN 1000 MG: 500 TABLET ORAL at 23:39

## 2024-01-04 LAB
B PARAPERT DNA SPEC QL NAA+PROBE: NOT DETECTED
B PERT DNA SPEC QL NAA+PROBE: NOT DETECTED
C PNEUM DNA NPH QL NAA+NON-PROBE: NOT DETECTED
FLUAV SUBTYP SPEC NAA+PROBE: NOT DETECTED
FLUBV RNA ISLT QL NAA+PROBE: NOT DETECTED
HADV DNA SPEC NAA+PROBE: NOT DETECTED
HCOV 229E RNA SPEC QL NAA+PROBE: NOT DETECTED
HCOV HKU1 RNA SPEC QL NAA+PROBE: NOT DETECTED
HCOV NL63 RNA SPEC QL NAA+PROBE: NOT DETECTED
HCOV OC43 RNA SPEC QL NAA+PROBE: NOT DETECTED
HMPV RNA NPH QL NAA+NON-PROBE: NOT DETECTED
HOLD SPECIMEN: NORMAL
HOLD SPECIMEN: NORMAL
HPIV1 RNA ISLT QL NAA+PROBE: NOT DETECTED
HPIV2 RNA SPEC QL NAA+PROBE: NOT DETECTED
HPIV3 RNA NPH QL NAA+PROBE: NOT DETECTED
HPIV4 P GENE NPH QL NAA+PROBE: NOT DETECTED
M PNEUMO IGG SER IA-ACNC: NOT DETECTED
QT INTERVAL: 375 MS
QTC INTERVAL: 467 MS
RHINOVIRUS RNA SPEC NAA+PROBE: NOT DETECTED
RSV RNA NPH QL NAA+NON-PROBE: NOT DETECTED
SARS-COV-2 RNA NPH QL NAA+NON-PROBE: DETECTED
WHOLE BLOOD HOLD COAG: NORMAL
WHOLE BLOOD HOLD SPECIMEN: NORMAL

## 2024-01-04 RX ORDER — BENZONATATE 200 MG/1
200 CAPSULE ORAL 3 TIMES DAILY PRN
Qty: 21 CAPSULE | Refills: 0 | Status: SHIPPED | OUTPATIENT
Start: 2024-01-04

## 2024-01-04 NOTE — DISCHARGE INSTRUCTIONS
Use Tylenol to help with any fever.  Return immediately for any chest pain, shortness of breath, or passing out.  Use medication for cough as prescribed.  Follow-up with your primary care doctor for further evaluation.  Return immediately for any further episodes of syncope.

## 2024-01-04 NOTE — ED TRIAGE NOTES
Pt reports SOA, cough, and headache beginning yesterday. Pt reports that today he felt so SOA that he passed out.

## 2024-01-04 NOTE — ED PROVIDER NOTES
EMERGENCY DEPARTMENT ENCOUNTER    Room Number:  19/19  PCP: Tray Padron MD  Patient Care Team:  Tray Padron MD as PCP - General (Internal Medicine)   Independent Historians: Patient, family    HPI:  Chief Complaint: Cough    A complete HPI/ROS/PMH/PSH/SH/FH are unobtainable due to: Nothing    Chronic or social conditions impacting patient care (Social Determinants of Health): None  (Financial Resource Strain / Food Insecurity / Transportation Needs / Physical Activity / Stress / Social Connections / Intimate Partner Violence / Housing Stability)    Context: Saleem Aguilar is a 73 y.o. male who presents to the ED c/o acute cough.  The patient reports that he has had a cough for the last week.  He reports that his primary care doctor thought he might have a cough related to his blood pressure medicine and his blood pressure medicines were switched.  He states that his cough started again yesterday.  He states he has been coughing very hard.  He reports he coughed so hard tonight that he passed out.  He denies any chest pain.  He does report some mild shortness of breath.  He was found to be febrile here.  He states he has had his vaccinations against COVID and flu.    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, left 01/03/2017    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, right 01/03/2017    Essential hypertension, benign 07/09/2012    Disease of tongue 07/17/2020    Gastroesophageal reflux disease 07/17/2020     Right shoulder pain 2020    Tremor 2020    Abnormal electrocardiogram 2015    Acute chest pain 2015    Benign essential tremor 2016    Carpal tunnel syndrome, bilateral 2016    Hypertension 2020    Inguinal hernia 10/04/2017    Memory loss 2016    PVC (premature ventricular contraction) 2015    Screening for colon cancer 2020     Resolved Ambulatory Problems     Diagnosis Date Noted    No Resolved Ambulatory Problems     Past Medical History:   Diagnosis Date    Cancer          PAST SURGICAL HISTORY  Past Surgical History:   Procedure Laterality Date    COLONOSCOPY N/A 2020    Procedure: COLONOSCOPY TO CECUM AND TERMINAL ILEUM;  Surgeon: Alejandro Noe MD;  Location: [facility];  Service: General;  Laterality: N/A;  SCREENING  post-- normal    HERNIA REPAIR      MENISCECTOMY Right     PROSTATECTOMY           FAMILY HISTORY  No family history on file.      SOCIAL HISTORY  Social History     Socioeconomic History    Marital status:    Tobacco Use    Smoking status: Former     Packs/day: 0.50     Years: 5.00     Additional pack years: 0.00     Total pack years: 2.50     Types: Cigarettes     Quit date:      Years since quittin.0     Passive exposure: Past    Smokeless tobacco: Never   Vaping Use    Vaping Use: Never used   Substance and Sexual Activity    Alcohol use: Never    Drug use: Never    Sexual activity: Yes     Partners: Female         ALLERGIES  Patient has no known allergies.        REVIEW OF SYSTEMS  Review of Systems  Included in HPI  All systems reviewed and negative except for those discussed in HPI.      PHYSICAL EXAM    I have reviewed the triage vital signs and nursing notes.    ED Triage Vitals   Temp Heart Rate Resp BP SpO2   24 2231 24 2231 24 2231 24 2242 24 2231   (!) 100.8 °F (38.2 °C) 97 18 163/89 96 %      Temp src Heart Rate Source Patient Position BP Location FiO2 (%)    01/03/24 2231 01/03/24 2231 01/03/24 2242 01/03/24 2242 --   Tympanic Monitor Lying Left arm        Physical Exam  GENERAL: Awake, alert, no acute distress, coughing persistently  SKIN: Warm, dry  HENT: Normocephalic, atraumatic  EYES: no scleral icterus  CV: regular rhythm, regular rate  RESPIRATORY: normal effort, lungs clear  ABDOMEN: soft, nontender, nondistended  MUSCULOSKELETAL: no deformity, no calf tenderness or swelling.  Equal pedal pulses.  NEURO: alert, moves all extremities, follows commands                                                               LAB RESULTS  Recent Results (from the past 24 hour(s))   ECG 12 Lead ED Triage Standing Order; SOA    Collection Time: 01/03/24 10:37 PM   Result Value Ref Range    QT Interval 375 ms    QTC Interval 467 ms   Comprehensive Metabolic Panel    Collection Time: 01/03/24 10:46 PM    Specimen: Blood   Result Value Ref Range    Glucose 111 (H) 65 - 99 mg/dL    BUN 10 8 - 23 mg/dL    Creatinine 0.99 0.76 - 1.27 mg/dL    Sodium 136 136 - 145 mmol/L    Potassium 3.8 3.5 - 5.2 mmol/L    Chloride 104 98 - 107 mmol/L    CO2 23.0 22.0 - 29.0 mmol/L    Calcium 9.8 8.6 - 10.5 mg/dL    Total Protein 6.9 6.0 - 8.5 g/dL    Albumin 4.1 3.5 - 5.2 g/dL    ALT (SGPT) 21 1 - 41 U/L    AST (SGOT) 24 1 - 40 U/L    Alkaline Phosphatase 97 39 - 117 U/L    Total Bilirubin 0.4 0.0 - 1.2 mg/dL    Globulin 2.8 gm/dL    A/G Ratio 1.5 g/dL    BUN/Creatinine Ratio 10.1 7.0 - 25.0    Anion Gap 9.0 5.0 - 15.0 mmol/L    eGFR 80.4 >60.0 mL/min/1.73   BNP    Collection Time: 01/03/24 10:46 PM    Specimen: Blood   Result Value Ref Range    proBNP <36.0 0.0 - 900.0 pg/mL   Single High Sensitivity Troponin T    Collection Time: 01/03/24 10:46 PM    Specimen: Blood   Result Value Ref Range    HS Troponin T 9 <22 ng/L   Green Top (Gel)    Collection Time: 01/03/24 10:46 PM   Result Value Ref Range    Extra Tube Hold for add-ons.    Lavender Top    Collection Time: 01/03/24 10:46 PM   Result Value  Ref Range    Extra Tube hold for add-on    Gold Top - SST    Collection Time: 01/03/24 10:46 PM   Result Value Ref Range    Extra Tube Hold for add-ons.    Light Blue Top    Collection Time: 01/03/24 10:46 PM   Result Value Ref Range    Extra Tube Hold for add-ons.    CBC Auto Differential    Collection Time: 01/03/24 10:46 PM    Specimen: Blood   Result Value Ref Range    WBC 5.45 3.40 - 10.80 10*3/mm3    RBC 4.80 4.14 - 5.80 10*6/mm3    Hemoglobin 12.8 (L) 13.0 - 17.7 g/dL    Hematocrit 40.1 37.5 - 51.0 %    MCV 83.5 79.0 - 97.0 fL    MCH 26.7 26.6 - 33.0 pg    MCHC 31.9 31.5 - 35.7 g/dL    RDW 13.0 12.3 - 15.4 %    RDW-SD 39.5 37.0 - 54.0 fl    MPV 8.4 6.0 - 12.0 fL    Platelets 238 140 - 450 10*3/mm3    Neutrophil % 72.3 42.7 - 76.0 %    Lymphocyte % 15.4 (L) 19.6 - 45.3 %    Monocyte % 10.6 5.0 - 12.0 %    Eosinophil % 0.9 0.3 - 6.2 %    Basophil % 0.6 0.0 - 1.5 %    Immature Grans % 0.2 0.0 - 0.5 %    Neutrophils, Absolute 3.94 1.70 - 7.00 10*3/mm3    Lymphocytes, Absolute 0.84 0.70 - 3.10 10*3/mm3    Monocytes, Absolute 0.58 0.10 - 0.90 10*3/mm3    Eosinophils, Absolute 0.05 0.00 - 0.40 10*3/mm3    Basophils, Absolute 0.03 0.00 - 0.20 10*3/mm3    Immature Grans, Absolute 0.01 0.00 - 0.05 10*3/mm3    nRBC 0.0 0.0 - 0.2 /100 WBC   Respiratory Panel PCR w/COVID-19(SARS-CoV-2) FABRICIO/ARMEN/DELILAH/PAD/COR/GEOFFREY In-House, NP Swab in UTM/VTM, 2 HR TAT - Swab, Nasopharynx    Collection Time: 01/03/24 11:33 PM    Specimen: Nasopharynx; Swab   Result Value Ref Range    ADENOVIRUS, PCR Not Detected Not Detected    Coronavirus 229E Not Detected Not Detected    Coronavirus HKU1 Not Detected Not Detected    Coronavirus NL63 Not Detected Not Detected    Coronavirus OC43 Not Detected Not Detected    COVID19 Detected (C) Not Detected - Ref. Range    Human Metapneumovirus Not Detected Not Detected    Human Rhinovirus/Enterovirus Not Detected Not Detected    Influenza A PCR Not Detected Not Detected    Influenza B PCR Not Detected Not  Detected    Parainfluenza Virus 1 Not Detected Not Detected    Parainfluenza Virus 2 Not Detected Not Detected    Parainfluenza Virus 3 Not Detected Not Detected    Parainfluenza Virus 4 Not Detected Not Detected    RSV, PCR Not Detected Not Detected    Bordetella pertussis pcr Not Detected Not Detected    Bordetella parapertussis PCR Not Detected Not Detected    Chlamydophila pneumoniae PCR Not Detected Not Detected    Mycoplasma pneumo by PCR Not Detected Not Detected       I ordered the above labs and independently reviewed the results.        RADIOLOGY  XR Chest 1 View    Result Date: 1/4/2024  Portable chest radiograph  HISTORY: Shortness of air  TECHNIQUE: Single PA portable radiograph of the chest  COMPARISON: None      FINDINGS AND IMPRESSION: No pulm consolidation, pleural effusion or pneumothorax is seen. Cardiac silhouette is within normal limits for size.  This report was finalized on 1/4/2024 12:11 AM by Dr. Jonny Wyatt M.D on Workstation: [id number]       I ordered the above noted radiological studies. Reviewed by me and discussed with radiologist.  See dictation for official radiology interpretation.      PROCEDURES    Procedures      MEDICATIONS GIVEN IN ER    Medications   sodium chloride 0.9 % flush 10 mL (has no administration in time range)   acetaminophen (TYLENOL) tablet 1,000 mg (1,000 mg Oral Given 1/3/24 2339)   guaiFENesin-codeine (GUAIFENESIN AC) 100-10 MG/5ML liquid 10 mL (10 mL Oral Given 1/3/24 2340)         ORDERS PLACED DURING THIS VISIT:  Orders Placed This Encounter   Procedures    Respiratory Panel PCR w/COVID-19(SARS-CoV-2) FABRICIO/ARMEN/DELILAH/PAD/COR/GEOFFREY In-House, NP Swab in UTM/VTM, 2 HR TAT - Swab, Nasopharynx    XR Chest 1 View    [place] Draw    Comprehensive Metabolic Panel    BNP    Single High Sensitivity Troponin T    CBC Auto Differential    NPO Diet NPO Type: Strict NPO    Undress & Gown    Continuous Pulse Oximetry    Vital Signs    Oxygen Therapy- Nasal Cannula; Titrate 1-6  LPM Per SpO2; 90 - 95%    ECG 12 Lead ED Triage Standing Order; SOA    Insert Peripheral IV    CBC & Differential    Green Top (Gel)    Lavender Top    Gold Top - SST    Light Blue Top         PROGRESS, DATA ANALYSIS, CONSULTS, AND MEDICAL DECISION MAKING    All labs have been independently interpreted by me.  All radiology studies have been reviewed by me and discussed with radiologist dictating the report.   EKG's independently viewed and interpreted by me.  Discussion below represents my analysis of pertinent findings related to patient's condition, differential diagnosis, treatment plan and final disposition.    Differential diagnosis includes but is not limited to pneumonia, arrhythmia, PE, viral syndrome, syncope, sepsis.    Clinical Scores:         [place] Syncope Rule Score: 1      ED Course as of 01/04/24 0105   Wed Jan 03, 2024 2302 Temp(!): 100.8 °F (38.2 °C) [TR]   2302 XR Chest 1 View  My independent interpretation of the chest x-ray is no dense consolidation [TR]   2302 EKG          EKG time: 2237  Rhythm/Rate: Normal sinus, rate 93  P waves and ID: Normal P, normal ID  QRS, axis: Right bundle branch block, left axis  ST and T waves: No acute    Independently Interpreted by me  Increased rate changed compared to prior 4/22/2021   [TR]   Thu Jan 04, 2024   0045 COVID19(!!): Detected [TR]   0045 HS Troponin T: 9 [TR]   0045 proBNP: <36.0 [TR]   0057 The patient has a normal troponin.  He has normal chemistries and blood counts.  He is positive for COVID and has a low-grade fever.  His symptoms have been going on for at least a week and he does not qualify for Paxlovid therapy.  His syncope was directly related to cough.  I see no evidence of PE, acute coronary syndrome, acute aortic syndrome, or pneumonia.  He has normal oxygenation.  He is not tachycardic.  His cough is greatly improved with codeine syrup.  I advised him that he needs to return immediately if he has any further episodes of  syncope, develops chest pain, or shortness of breath.  He is agreeable.  He is [place] syncope negative except for his shortness of breath which is explained by COVID.  He is agreeable to discharge home. [TR]      ED Course User Index  [TR] Obie Westbrook MD                   PPE: The patient wore a mask and I wore an N95 mask throughout the entire patient encounter.       AS OF 01:05 EST VITALS:    BP - 163/89  HR - 97  TEMP - (!) 100.8 °F (38.2 °C) (Tympanic)  O2 SATS - 96%        DIAGNOSIS  Final diagnoses:   COVID-19   Cough syncope         DISPOSITION  ED Disposition       ED Disposition   Discharge    Condition   Stable    Comment   --                  Note Disclaimer: At [facility], we believe that sharing information builds trust and better relationships. You are receiving this note because you recently visited [facility]. It is possible you will see health information before a provider has talked with you about it. This kind of information can be easy to misunderstand. To help you fully understand what it means for your health, we urge you to discuss this note with your provider.         Obie Westbrook MD  01/04/24 0059       Obie Westbrook MD  01/04/24 0105

## 2024-01-11 ENCOUNTER — OFFICE VISIT (OUTPATIENT)
Dept: FAMILY MEDICINE CLINIC | Facility: CLINIC | Age: 74
End: 2024-01-11
Payer: MEDICARE

## 2024-01-11 VITALS
SYSTOLIC BLOOD PRESSURE: 128 MMHG | HEIGHT: 69 IN | DIASTOLIC BLOOD PRESSURE: 80 MMHG | OXYGEN SATURATION: 97 % | BODY MASS INDEX: 27.85 KG/M2 | RESPIRATION RATE: 16 BRPM | TEMPERATURE: 97.7 F | HEART RATE: 66 BPM | WEIGHT: 188 LBS

## 2024-01-11 DIAGNOSIS — Z13.220 SCREENING FOR HYPERLIPIDEMIA: Primary | ICD-10-CM

## 2024-01-11 DIAGNOSIS — Z00.00 MEDICARE ANNUAL WELLNESS VISIT, SUBSEQUENT: ICD-10-CM

## 2024-01-11 RX ORDER — BENZONATATE 100 MG/1
CAPSULE ORAL
Qty: 20 CAPSULE | Refills: 1 | Status: SHIPPED | OUTPATIENT
Start: 2024-01-11

## 2024-01-11 NOTE — PROGRESS NOTES
The ABCs of the Annual Wellness Visit  Subsequent Medicare Wellness Visit    Chief Complaint   Patient presents with    Annual Exam     AWV        Subjective    History of Present Illness:  Saleem Aguilar is a 73 y.o. male who presents for a Subsequent Medicare Wellness Visit.    The following portions of the patient's history were reviewed and   updated as appropriate: allergies, current medications, past family history, past medical history, past social history, past surgical history, and problem list.    Compared to one year ago, the patient feels his physical   health is worse.    Compared to one year ago, the patient feels his mental   health is the same.    Recent Hospitalizations:  He was not admitted to the hospital during the last year.       Current Medical Providers:  Patient Care Team:  Tray Padron MD as PCP - General (Internal Medicine)    Outpatient Medications Prior to Visit   Medication Sig Dispense Refill    amLODIPine (NORVASC) 5 MG tablet Take 1 tablet by mouth Daily. 30 tablet 2    benzonatate (TESSALON) 200 MG capsule Take 1 capsule by mouth 3 (Three) Times a Day As Needed for Cough. 21 capsule 0    Biotin 5 MG capsule Take  by mouth Daily.      escitalopram (LEXAPRO) 10 MG tablet TAKE 1 TABLET BY MOUTH EVERY DAY 90 tablet 1    Misc Natural Products (OSTEO BI-FLEX TRIPLE STRENGTH PO) Take  by mouth.      Multiple Vitamins-Minerals (CENTRUM SILVER ADULT 50+ PO) Take  by mouth Daily.      naproxen (NAPROSYN) 375 MG tablet TAKE 1 TABLET BY MOUTH TWICE A DAY WITH FOOD 60 tablet 0    Omega-3 Fatty Acids (FISH OIL) 1200 MG capsule capsule Take  by mouth.      pyridoxine (VITAMIN B-6) 100 MG tablet tablet Take  by mouth Daily.      vitamin C (ASCORBIC ACID) 500 MG tablet Take 1 tablet by mouth Daily.      VITAMIN D, CHOLECALCIFEROL, PO Take  by mouth Daily.      Zinc 50 MG tablet Take  by mouth Daily.      aspirin 81 MG EC tablet Take 1 tablet by mouth Daily. (Patient not taking: Reported on  12/18/2023)      fexofenadine (Allegra Allergy) 180 MG tablet Take 1 tablet by mouth Daily. (Patient not taking: Reported on 1/11/2024) 30 tablet 2    fexofenadine (ALLEGRA) 180 MG tablet TAKE 1 TABLET BY MOUTH EVERY DAY (Patient not taking: Reported on 1/11/2024) 30 tablet 1    lisinopril (PRINIVIL,ZESTRIL) 5 MG tablet TAKE 2 TABLETS BY MOUTH EVERY DAY (Patient not taking: Reported on 1/11/2024) 180 tablet 1    propranolol (INDERAL) 10 MG tablet TAKE 1 TABLET BY MOUTH EVERY DAY (Patient not taking: Reported on 1/11/2024) 90 tablet 2     No facility-administered medications prior to visit.       No opioid medication identified on active medication list. I have reviewed chart for other potential  high risk medication/s and harmful drug interactions in the elderly.        Aspirin is on active medication list. Aspirin use is not indicated based on review of current medical condition/s. Risk of harm outweighs potential benefits. Patient instructed to discontinue this medication.  .      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  ACP discussion was held with the patient during this visit. Patient has an advance directive in EMR which is still valid.     Review of Systems   Constitutional: Negative.    HENT: Negative.     Eyes: Negative.    Respiratory: Negative.  "    Cardiovascular: Negative.    Gastrointestinal: Negative.    Endocrine: Negative.    Genitourinary: Negative.    Musculoskeletal: Negative.    Skin: Negative.    Allergic/Immunologic: Negative.    Neurological: Negative.    Hematological: Negative.    Psychiatric/Behavioral: Negative.           Objective    Vitals:    01/11/24 1402   BP: 128/80   BP Location: Right arm   Patient Position: Sitting   Cuff Size: Adult   Pulse: 66   Resp: 16   Temp: 97.7 °F (36.5 °C)   TempSrc: Oral   SpO2: 97%   Weight: 85.3 kg (188 lb)   Height: 175.3 cm (69.02\")     BMI Readings from Last 1 Encounters:   01/11/24 27.75 kg/m²   BMI is above normal parameters. Recommendations include: exercise counseling    Does the patient have evidence of cognitive impairment? No    Physical Exam  Vitals and nursing note reviewed.   Constitutional:       Appearance: He is well-developed.   HENT:      Head: Normocephalic and atraumatic.   Eyes:      Conjunctiva/sclera: Conjunctivae normal.   Cardiovascular:      Rate and Rhythm: Normal rate and regular rhythm.      Heart sounds: Normal heart sounds.   Pulmonary:      Effort: Pulmonary effort is normal.      Breath sounds: Normal breath sounds.   Abdominal:      General: Bowel sounds are normal.      Palpations: Abdomen is soft.   Musculoskeletal:         General: Normal range of motion.      Cervical back: Normal range of motion and neck supple.   Skin:     General: Skin is warm and dry.   Neurological:      Mental Status: He is alert and oriented to person, place, and time.   Psychiatric:         Behavior: Behavior normal.       Lab Results   Component Value Date    CHLPL 153 01/15/2024    TRIG 62 01/15/2024    HDL 49 01/15/2024    LDL 91 01/15/2024    VLDL 13 01/15/2024            HEALTH RISK ASSESSMENT    Smoking Status:  Social History     Tobacco Use   Smoking Status Former    Packs/day: 0.50    Years: 5.00    Additional pack years: 0.00    Total pack years: 2.50    Types: Cigarettes    Quit " date:     Years since quittin.0    Passive exposure: Past   Smokeless Tobacco Never     Alcohol Consumption:  Social History     Substance and Sexual Activity   Alcohol Use Never     Fall Risk Screen:    FRANCIADI Fall Risk Assessment was completed, and patient is at LOW risk for falls.Assessment completed on:2024    Depression Screenin/11/2024     2:45 PM   PHQ-2/PHQ-9 Depression Screening   Little Interest or Pleasure in Doing Things 0-->not at all   Feeling Down, Depressed or Hopeless 0-->not at all   PHQ-9: Brief Depression Severity Measure Score 0       Health Habits and Functional and Cognitive Screenin/29/2022    10:42 AM   Functional & Cognitive Status   Do you have difficulty preparing food and eating? No   Do you have difficulty bathing yourself, getting dressed or grooming yourself? No   Do you have difficulty using the toilet? No   Do you have difficulty moving around from place to place? No   Do you have trouble with steps or getting out of a bed or a chair? No   Dental Exam Up to date   Eye Exam Up to date   Current Exercises Include Walking   Do you need help using the phone?  No   Are you deaf or do you have serious difficulty hearing?  Yes   Do you need help to go to places out of walking distance? No   Do you need help shopping? No   Do you need help preparing meals?  No   Do you need help with housework?  No   Do you need help with laundry? No   Do you need help taking your medications? No   Do you need help managing money? No   Do you ever drive or ride in a car without wearing a seat belt? No   Have you felt unusual stress, anger or loneliness in the last month? No   Who do you live with? Spouse   If you need help, do you have trouble finding someone available to you? No   Have you been bothered in the last four weeks by sexual problems? No   Do you have difficulty concentrating, remembering or making decisions? Yes       Age-appropriate Screening Schedule:  Refer  to the list below for future screening recommendations based on patient's age, sex and/or medical conditions. Orders for these recommended tests are listed in the plan section. The patient has been provided with a written plan.    Health Maintenance   Topic Date Due    ZOSTER VACCINE (1 of 2) Never done    ANNUAL WELLNESS VISIT  12/29/2023    BMI FOLLOWUP  01/11/2025    LIPID PANEL  01/15/2025    TDAP/TD VACCINES (2 - Td or Tdap) 04/06/2028    COLORECTAL CANCER SCREENING  09/16/2030    HEPATITIS C SCREENING  Completed    COVID-19 Vaccine  Completed    INFLUENZA VACCINE  Completed    Pneumococcal Vaccine 65+  Completed    AAA SCREEN (ONE-TIME)  Completed              Assessment & Plan   This 73-year-old patient presents today for Medicare wellness review.  He relates he is feeling well having had no problems in the interim of visits.    His blood pressure shows good control today at 128/80 in the left arm sitting position standard cuff.  His weight is up 8 pounds from his last visit about a month or so ago.    .Regarding anticipatory guidance.  We discussed the importance of keeping his LDL cholesterol less than 100, We Gave him a low-cholesterol diet sheet and discussed that noting that our goal was to keep his LDL less than 100.  We reviewed his last LDL done 1 year ago which showed an LDL of 84.  His previous LDL was also 85 and within normal limits.  His HDL was normal at 52 with a triglyceride of 77.      CMS Preventative Services Quick Reference  Risk Factors Identified During Encounter  None Identified  The above risks/problems have been discussed with the patient.  Follow up actions/plans if indicated are seen below in the Assessment/Plan Section.  Pertinent information has been shared with the patient in the After Visit Summary.    Diagnoses and all orders for this visit:    1. Screening for hyperlipidemia (Primary)  -     Cancel: Lipid Panel; Future  -     Lipid Panel    2. Medicare annual wellness visit,  subsequent    Other orders  -     benzonatate (Tessalon Perles) 100 MG capsule; 1 tablet twice daily as needed for cough  Dispense: 20 capsule; Refill: 1        Plan:  1) follow-up in the next several months.       Follow Up:   No follow-ups on file.     An After Visit Summary and PPPS were made available to the patient.

## 2024-01-16 LAB
CHOLEST SERPL-MCNC: 153 MG/DL (ref 0–200)
HDLC SERPL-MCNC: 49 MG/DL (ref 40–60)
LDLC SERPL CALC-MCNC: 91 MG/DL (ref 0–100)
TRIGL SERPL-MCNC: 62 MG/DL (ref 0–150)
VLDLC SERPL CALC-MCNC: 13 MG/DL (ref 5–40)

## 2024-03-01 ENCOUNTER — OFFICE VISIT (OUTPATIENT)
Dept: FAMILY MEDICINE CLINIC | Facility: CLINIC | Age: 74
End: 2024-03-01
Payer: MEDICARE

## 2024-03-01 VITALS
DIASTOLIC BLOOD PRESSURE: 70 MMHG | HEIGHT: 69 IN | BODY MASS INDEX: 27.85 KG/M2 | WEIGHT: 188 LBS | SYSTOLIC BLOOD PRESSURE: 110 MMHG

## 2024-03-01 DIAGNOSIS — I10 PRIMARY HYPERTENSION: ICD-10-CM

## 2024-03-01 DIAGNOSIS — E78.00 PURE HYPERCHOLESTEROLEMIA: ICD-10-CM

## 2024-03-01 DIAGNOSIS — J32.0 CHRONIC MAXILLARY SINUSITIS: Primary | ICD-10-CM

## 2024-03-01 PROCEDURE — 1160F RVW MEDS BY RX/DR IN RCRD: CPT | Performed by: INTERNAL MEDICINE

## 2024-03-01 PROCEDURE — 99214 OFFICE O/P EST MOD 30 MIN: CPT | Performed by: INTERNAL MEDICINE

## 2024-03-01 PROCEDURE — 3078F DIAST BP <80 MM HG: CPT | Performed by: INTERNAL MEDICINE

## 2024-03-01 PROCEDURE — 3074F SYST BP LT 130 MM HG: CPT | Performed by: INTERNAL MEDICINE

## 2024-03-01 PROCEDURE — 1159F MED LIST DOCD IN RCRD: CPT | Performed by: INTERNAL MEDICINE

## 2024-03-11 ENCOUNTER — TELEPHONE (OUTPATIENT)
Dept: FAMILY MEDICINE CLINIC | Facility: CLINIC | Age: 74
End: 2024-03-11

## 2024-03-11 RX ORDER — FLUTICASONE PROPIONATE 50 MCG
2 SPRAY, SUSPENSION (ML) NASAL DAILY
Qty: 9.9 ML | Refills: 1 | Status: SHIPPED | OUTPATIENT
Start: 2024-03-11

## 2024-03-11 NOTE — TELEPHONE ENCOUNTER
Caller: Saleem Aguilar    Relationship: Self    Best call back number: [phone]    What is the best time to reach you: ANYTIME     Who are you requesting to speak with (clinical staff, provider,  specific staff member): DR. WREN.     What was the call regarding: PATIENT STATES HE WANTED TO LET DR. WREN KNOW THAT THE DRAINAGE IS STILL ON GOING AND IS WANTING TO KNOW WHAT DR. WREN ADVISES HIM TO DO.     PATIENT IS REQUESTING A CALL BACK.

## 2024-03-12 NOTE — PROGRESS NOTES
03/01/2024    Assessment & Plan     This 73-year-old patient presents today for follow-up of hyperlipidemia he relates he is taking his medications as prescribed.  He is watching his diet carefully trying to avoid high cholesterol foods.    He also complains of drainage in his throat off and on for the past 4 to 6 weeks he relates that his nose is constantly running.  He has no pain in the facial region denies any change in his hearing.  He is on a number of OTC vitamins and preparations.    Review of his labs from 1/15/2024 shows that his total cholesterol was excellent at 153.  His LDL was 91 and his HDL was 49.    I feel that the patient's numerous OTC preparations may be contributing to his allergy symptoms.  Will stop all of the OTC medications and all of his medications with the exception of the amlodipine Lexapro and Allegra.  Patient may need referral to allergy for further evaluation.      Diagnoses and all orders for this visit:    1. Chronic maxillary sinusitis (Primary)    2. Primary hypertension    3. Pure hypercholesterolemia    Other orders  -     fluticasone (FLONASE) 50 MCG/ACT nasal spray; 2 sprays into the nostril(s) as directed by provider Daily.  Dispense: 9.9 mL; Refill: 1                  CC: Hyperlipidemia (F/U.  Also has a lot of drainage and dizziness.---no other issues)  .        HPI  History of Present Illness     Subjective   Saleem Aguilar is a 73 y.o. male.      The following portions of the patient's history were reviewed and updated as appropriate: allergies, current medications, past family history, past medical history, past social history, past surgical history, and problem list.    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, left    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,  right    Essential hypertension, benign    Disease of tongue    Gastroesophageal reflux disease    Right shoulder pain    Tremor    Abnormal electrocardiogram    Acute chest pain    Benign essential tremor    Carpal tunnel syndrome, bilateral    Hypertension    Inguinal hernia    Memory loss    PVC (premature ventricular contraction)    Screening for colon cancer       Past Medical History  Past Medical History:   Diagnosis Date    Benign positional vertigo 2019    Cancer     COPD (chronic obstructive pulmonary disease) 2018    Disease of tongue 2020    Essential hypertension, benign 2012    Gastroesophageal reflux disease 2020    Major depression, single episode 2019    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, right 1/3/2017    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 ENDOSCOPY;  Service: General;  Laterality: N/A;  SCREENING  post-- normal    HERNIA REPAIR      MENISCECTOMY Right     PROSTATECTOMY         Family History  History reviewed. No pertinent family history.    Social History  Social History    Tobacco Use      Smoking status: Former        Packs/day: 0.00        Years: 0.5 packs/day for 5.0 years (2.5 ttl pk-yrs)        Types: Cigarettes        Start date:         Quit date:         Years since quittin.2        Passive exposure: Past      Smokeless tobacco: Never       Is the Patient a current tobacco user? No    Allergies  No Known Allergies    Current Medications    Current Outpatient Medications:     amLODIPine (NORVASC) 5 MG tablet, Take 1 tablet by mouth Daily., Disp: 30 tablet, Rfl: 2    Biotin 5 MG capsule, Take  by  mouth Daily., Disp: , Rfl:     escitalopram (LEXAPRO) 10 MG tablet, TAKE 1 TABLET BY MOUTH EVERY DAY, Disp: 90 tablet, Rfl: 1    fexofenadine (Allegra Allergy) 180 MG tablet, Take 1 tablet by mouth Daily., Disp: 30 tablet, Rfl: 2    Misc Natural Products (OSTEO BI-FLEX TRIPLE STRENGTH PO), Take  by mouth., Disp: , Rfl:     Multiple Vitamins-Minerals (CENTRUM SILVER ADULT 50+ PO), Take  by mouth Daily., Disp: , Rfl:     Omega-3 Fatty Acids (FISH OIL) 1200 MG capsule capsule, Take  by mouth., Disp: , Rfl:     pyridoxine (VITAMIN B-6) 100 MG tablet tablet, Take  by mouth Daily., Disp: , Rfl:     vitamin C (ASCORBIC ACID) 500 MG tablet, Take 1 tablet by mouth Daily., Disp: , Rfl:     VITAMIN D, CHOLECALCIFEROL, PO, Take  by mouth Daily., Disp: , Rfl:     Zinc 50 MG tablet, Take  by mouth Daily., Disp: , Rfl:     fexofenadine (ALLEGRA) 180 MG tablet, TAKE 1 TABLET BY MOUTH EVERY DAY, Disp: 30 tablet, Rfl: 1    fluticasone (FLONASE) 50 MCG/ACT nasal spray, 2 sprays into the nostril(s) as directed by provider Daily., Disp: 9.9 mL, Rfl: 1     Review of System  Review of Systems  I have reviewed and confirmed the accuracy of the ROS as documented by the MA/LPN/RN Tray Padron MD    Vitals:    03/01/24 0959   BP: 110/70     Body mass index is 27.76 kg/m².    Objective     Physical Exam  Physical Exam        Tray Padron MD  03/01/2024

## 2024-03-17 DIAGNOSIS — J32.9 CHRONIC SINUSITIS, UNSPECIFIED LOCATION: Primary | ICD-10-CM

## 2024-03-18 RX ORDER — AMLODIPINE BESYLATE 5 MG/1
5 TABLET ORAL DAILY
Qty: 90 TABLET | Refills: 1 | Status: SHIPPED | OUTPATIENT
Start: 2024-03-18

## 2024-03-21 RX ORDER — FEXOFENADINE HCL 180 MG/1
180 TABLET ORAL DAILY
Qty: 30 TABLET | Refills: 2 | Status: SHIPPED | OUTPATIENT
Start: 2024-03-21

## 2024-04-23 ENCOUNTER — TELEPHONE (OUTPATIENT)
Dept: FAMILY MEDICINE CLINIC | Facility: CLINIC | Age: 74
End: 2024-04-23

## 2024-04-23 NOTE — TELEPHONE ENCOUNTER
Caller: Saleem Aguilar    Relationship: Self    Best call back number: [phone] (Home)     What is the best time to reach you: ANYTIME, ASAP    Who are you requesting to speak with (clinical staff, provider,  specific staff member): CLINICAL STAFF/ DR WREN    Do you know the name of the person who called: Saleem Aguilar    What was the call regarding: PATIENT WAS TESTED FOR HEPATITIS C BACK IN 12/2020 BY DR WREN AND NOW HIS WIFE HAS RECENTLY BEEN FOUND TO BE POSITIVE FOR THIS AND PATIENT IS ASKING IF HE NEEDS TO BE RETESTED    PLEASE ADVISE PATIENT REGARDING THIS ASAP    Is it okay if the provider responds through MyChart: PLEASE CALL PATIENT BACK REGARDING THIS ASAP

## 2024-04-25 RX ORDER — AZELASTINE HYDROCHLORIDE 137 UG/1
SPRAY, METERED NASAL
COMMUNITY
Start: 2024-04-15

## 2024-04-26 ENCOUNTER — OFFICE VISIT (OUTPATIENT)
Dept: FAMILY MEDICINE CLINIC | Facility: CLINIC | Age: 74
End: 2024-04-26
Payer: MEDICARE

## 2024-04-26 VITALS
SYSTOLIC BLOOD PRESSURE: 122 MMHG | WEIGHT: 188 LBS | HEIGHT: 69 IN | DIASTOLIC BLOOD PRESSURE: 84 MMHG | BODY MASS INDEX: 27.85 KG/M2

## 2024-04-26 DIAGNOSIS — R53.83 OTHER FATIGUE: Primary | ICD-10-CM

## 2024-04-26 DIAGNOSIS — R53.83 OTHER FATIGUE: ICD-10-CM

## 2024-04-26 DIAGNOSIS — I10 PRIMARY HYPERTENSION: Chronic | ICD-10-CM

## 2024-04-26 PROCEDURE — 3079F DIAST BP 80-89 MM HG: CPT | Performed by: INTERNAL MEDICINE

## 2024-04-26 PROCEDURE — 99214 OFFICE O/P EST MOD 30 MIN: CPT | Performed by: INTERNAL MEDICINE

## 2024-04-26 PROCEDURE — 1125F AMNT PAIN NOTED PAIN PRSNT: CPT | Performed by: INTERNAL MEDICINE

## 2024-04-26 PROCEDURE — 3074F SYST BP LT 130 MM HG: CPT | Performed by: INTERNAL MEDICINE

## 2024-04-27 LAB — HCV IGG SERPL QL IA: NON REACTIVE

## 2024-05-10 ENCOUNTER — TELEPHONE (OUTPATIENT)
Dept: FAMILY MEDICINE CLINIC | Facility: CLINIC | Age: 74
End: 2024-05-10
Payer: MEDICARE

## 2024-05-20 DIAGNOSIS — F32.9 CURRENT EPISODE OF MAJOR DEPRESSIVE DISORDER WITHOUT PRIOR EPISODE, UNSPECIFIED DEPRESSION EPISODE SEVERITY: ICD-10-CM

## 2024-05-20 RX ORDER — ESCITALOPRAM OXALATE 10 MG/1
TABLET ORAL
Qty: 90 TABLET | Refills: 1 | Status: SHIPPED | OUTPATIENT
Start: 2024-05-20

## 2024-05-27 NOTE — PROGRESS NOTES
04/26/2024    Assessment & Plan     This pleasant 73-year-old presents today for discussion regarding hepatitis C.  He relates he is feeling well having last been seen approximately a month ago.  His weight is 188 pounds unchanged from the past.  He relates that his wife was recently found to have positive hepatitis C antibodies about 3 weeks ago.  He wants to know about his infectivity.  He does not use IV drugs and relates that he has not been sexually active with anyone other than his wife for the past several years.  His wife denies a past history of IV drug use.    His blood pressures normal at 122/84 in the left arm sitting position standard cuff.    Physical examination is unremarkable.  He has no hepatosplenomegaly.  His last LDL done on 1/24 Showed a normal liver profile.    The patient was previously shown to have no  hepatitis C antibodies back on 12/20.  Diagnoses and all orders for this visit:    1. Other fatigue (Primary)  -     Hepatitis C antibody; Future    2. Primary hypertension         Plan:  1.)  Serum hepatitis C antibody evaluation  2.)  Repeat hepatitis C antibody evaluation and 6 to 8 weeks  3.)  Comprehensive metabolic profile  4.)  Follow-up in 6 to 8 weeks.         CC: Hepatitis C (PATIENT WAS TESTED FOR HEPATITIS C BACK IN 12/2020 BY DR WREN AND NOW HIS WIFE HAS RECENTLY BEEN FOUND TO BE POSITIVE FOR THIS--no other issues)  .        HPI  Hepatitis C  Pertinent negatives include no chest pain, chills, coughing, fever, nausea or vomiting.        Abel Aguilar is a 73 y.o. male.      The following portions of the patient's history were reviewed and updated as appropriate: allergies, current medications, past family history, past medical history, past social history, past surgical history, and problem list.    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, left    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, right    Essential hypertension, benign    Disease of tongue    Gastroesophageal reflux disease    Right shoulder pain    Tremor    Abnormal electrocardiogram    Acute chest pain    Benign essential tremor    Carpal tunnel syndrome, bilateral    Hypertension    Inguinal hernia    Memory loss    PVC (premature ventricular contraction)    Screening for colon cancer       Past Medical History  Past Medical History:   Diagnosis Date    Benign positional vertigo 2019    Cancer     COPD (chronic obstructive pulmonary disease) 2018    Disease of tongue 2020    Essential hypertension, benign 2012    Gastroesophageal reflux disease 2020    Major depression, single episode 2019    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, right 1/3/2017    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 No pertinent family history.    Social History  Social History    Tobacco Use      Smoking status: Former        Packs/day: 0.00        Years: 0.5 packs/day for 5.0 years (2.5 ttl pk-yrs)        Types: Cigarettes        Start date:         Quit date:         Years since quittin.4        Passive exposure: Past      Smokeless tobacco: Never       Is the Patient a current tobacco user? No    Allergies  No Known Allergies    Current Medications    Current Outpatient  Medications:     amLODIPine (NORVASC) 5 MG tablet, TAKE 1 TABLET BY MOUTH EVERY DAY, Disp: 90 tablet, Rfl: 1    Azelastine HCl 137 MCG/SPRAY solution, SPRAY 1 TO 2 SPRAYS INTO EACH NOSTRIL TWICE A DAY AS NEEDED, Disp: , Rfl:     Biotin 5 MG capsule, Take  by mouth Daily., Disp: , Rfl:     fexofenadine (ALLEGRA) 180 MG tablet, TAKE 1 TABLET BY MOUTH EVERY DAY, Disp: 30 tablet, Rfl: 2    fluticasone (FLONASE) 50 MCG/ACT nasal spray, 2 sprays into the nostril(s) as directed by provider Daily., Disp: 9.9 mL, Rfl: 1    Misc Natural Products (OSTEO BI-FLEX TRIPLE STRENGTH PO), Take  by mouth., Disp: , Rfl:     Multiple Vitamins-Minerals (CENTRUM SILVER ADULT 50+ PO), Take  by mouth Daily., Disp: , Rfl:     Omega-3 Fatty Acids (FISH OIL) 1200 MG capsule capsule, Take  by mouth., Disp: , Rfl:     pyridoxine (VITAMIN B-6) 100 MG tablet tablet, Take  by mouth Daily., Disp: , Rfl:     vitamin C (ASCORBIC ACID) 500 MG tablet, Take 1 tablet by mouth Daily., Disp: , Rfl:     VITAMIN D, CHOLECALCIFEROL, PO, Take  by mouth Daily., Disp: , Rfl:     Zinc 50 MG tablet, Take  by mouth Daily., Disp: , Rfl:     escitalopram (LEXAPRO) 10 MG tablet, TAKE 1 TABLET BY MOUTH EVERY DAY, Disp: 90 tablet, Rfl: 1     Review of System  Review of Systems   Constitutional:  Negative for chills and fever.   Respiratory:  Negative for cough and shortness of breath.    Cardiovascular:  Negative for chest pain and palpitations.   Gastrointestinal:  Negative for constipation, diarrhea, nausea and vomiting.   Neurological:  Negative for dizziness and headache.     I have reviewed and confirmed the accuracy of the ROS as documented by the MA/LPN/RN Tray Padron MD    Vitals:    04/26/24 0732   BP: 122/84     Body mass index is 27.76 kg/m².    Objective     Physical Exam  Physical Exam  Vitals and nursing note reviewed.   Constitutional:       Appearance: He is well-developed.   HENT:      Head: Normocephalic and atraumatic.   Eyes:       Conjunctiva/sclera: Conjunctivae normal.   Cardiovascular:      Rate and Rhythm: Normal rate and regular rhythm.      Heart sounds: Normal heart sounds.   Pulmonary:      Effort: Pulmonary effort is normal.      Breath sounds: Normal breath sounds.   Abdominal:      General: Bowel sounds are normal.      Palpations: Abdomen is soft.   Musculoskeletal:         General: Normal range of motion.      Cervical back: Normal range of motion and neck supple.   Skin:     General: Skin is warm and dry.   Neurological:      Mental Status: He is alert and oriented to person, place, and time.   Psychiatric:         Behavior: Behavior normal.             Tray Padron MD  04/26/2024

## 2024-07-02 ENCOUNTER — OFFICE VISIT (OUTPATIENT)
Dept: FAMILY MEDICINE CLINIC | Facility: CLINIC | Age: 74
End: 2024-07-02
Payer: MEDICARE

## 2024-07-02 VITALS
HEIGHT: 69 IN | SYSTOLIC BLOOD PRESSURE: 120 MMHG | BODY MASS INDEX: 27.99 KG/M2 | DIASTOLIC BLOOD PRESSURE: 80 MMHG | WEIGHT: 189 LBS

## 2024-07-02 DIAGNOSIS — D50.9 IRON DEFICIENCY ANEMIA, UNSPECIFIED IRON DEFICIENCY ANEMIA TYPE: ICD-10-CM

## 2024-07-02 DIAGNOSIS — R53.83 OTHER FATIGUE: Primary | ICD-10-CM

## 2024-07-02 PROCEDURE — 99213 OFFICE O/P EST LOW 20 MIN: CPT | Performed by: INTERNAL MEDICINE

## 2024-07-02 PROCEDURE — 1125F AMNT PAIN NOTED PAIN PRSNT: CPT | Performed by: INTERNAL MEDICINE

## 2024-07-02 PROCEDURE — 3079F DIAST BP 80-89 MM HG: CPT | Performed by: INTERNAL MEDICINE

## 2024-07-02 PROCEDURE — 3074F SYST BP LT 130 MM HG: CPT | Performed by: INTERNAL MEDICINE

## 2024-07-05 LAB
1,25(OH)2D SERPL-MCNC: 87.3 PG/ML (ref 24.8–81.5)
ALBUMIN SERPL-MCNC: 4.1 G/DL (ref 3.8–4.8)
ALP SERPL-CCNC: 118 IU/L (ref 44–121)
ALT SERPL-CCNC: 15 IU/L (ref 0–44)
AST SERPL-CCNC: 19 IU/L (ref 0–40)
BASOPHILS # BLD AUTO: 0.1 X10E3/UL (ref 0–0.2)
BASOPHILS NFR BLD AUTO: 1 %
BILIRUB SERPL-MCNC: 0.3 MG/DL (ref 0–1.2)
BUN SERPL-MCNC: 15 MG/DL (ref 8–27)
BUN/CREAT SERPL: 18 (ref 10–24)
CALCIUM SERPL-MCNC: 10.7 MG/DL (ref 8.6–10.2)
CHLORIDE SERPL-SCNC: 105 MMOL/L (ref 96–106)
CO2 SERPL-SCNC: 23 MMOL/L (ref 20–29)
CREAT SERPL-MCNC: 0.85 MG/DL (ref 0.76–1.27)
EGFRCR SERPLBLD CKD-EPI 2021: 91 ML/MIN/1.73
EOSINOPHIL # BLD AUTO: 0.1 X10E3/UL (ref 0–0.4)
EOSINOPHIL NFR BLD AUTO: 2 %
ERYTHROCYTE [DISTWIDTH] IN BLOOD BY AUTOMATED COUNT: 13.7 % (ref 11.6–15.4)
GLOBULIN SER CALC-MCNC: 3.3 G/DL (ref 1.5–4.5)
GLUCOSE SERPL-MCNC: 87 MG/DL (ref 70–99)
HCT VFR BLD AUTO: 42.8 % (ref 37.5–51)
HGB BLD-MCNC: 13.8 G/DL (ref 13–17.7)
IMM GRANULOCYTES # BLD AUTO: 0 X10E3/UL (ref 0–0.1)
IMM GRANULOCYTES NFR BLD AUTO: 0 %
IRON SERPL-MCNC: 84 UG/DL (ref 38–169)
LYMPHOCYTES # BLD AUTO: 1.9 X10E3/UL (ref 0.7–3.1)
LYMPHOCYTES NFR BLD AUTO: 42 %
MCH RBC QN AUTO: 27.9 PG (ref 26.6–33)
MCHC RBC AUTO-ENTMCNC: 32.2 G/DL (ref 31.5–35.7)
MCV RBC AUTO: 87 FL (ref 79–97)
MONOCYTES # BLD AUTO: 0.4 X10E3/UL (ref 0.1–0.9)
MONOCYTES NFR BLD AUTO: 8 %
NEUTROPHILS # BLD AUTO: 2.1 X10E3/UL (ref 1.4–7)
NEUTROPHILS NFR BLD AUTO: 47 %
PLATELET # BLD AUTO: 249 X10E3/UL (ref 150–450)
POTASSIUM SERPL-SCNC: 4.5 MMOL/L (ref 3.5–5.2)
PROT SERPL-MCNC: 7.4 G/DL (ref 6–8.5)
RBC # BLD AUTO: 4.95 X10E6/UL (ref 4.14–5.8)
SODIUM SERPL-SCNC: 139 MMOL/L (ref 134–144)
VIT B12 SERPL-MCNC: 793 PG/ML (ref 232–1245)
WBC # BLD AUTO: 4.5 X10E3/UL (ref 3.4–10.8)

## 2024-07-09 NOTE — PROGRESS NOTES
"07/02/2024    Assessment & Plan   This pleasant 74-year-old patient presents today for follow-up of labs.  He relates that he has had episodes of fatigue and dizziness and he feels like his hands quick at times.  The fatigue is said to be very intermittent with no pattern discernible.  He is retired and relates no unusual stresses or pressure.  He also admits to more concerned about his recent memory and recall.  On closer discussion we find that he frequently misses breakfast and this may have some bearing on his feeling fatigued late in the morning or early afternoon.    His blood pressure shows good control at 120/80 in the left arm sitting position standard cuff.    Will try to adjust his diet so that he is more consistently eating breakfast and we will schedule him for [facility] evaluation in the next few weeks.        Review of his labs from 4/26/2024 shows that his hepatitis C antibody was normal.  His lipid panel was likewise normal.  His LDL was 91 with an HDL of 49 and a total cholesterol of 153.    Comprehensive metabolic profile done in January 2024 showed a glucose level of 111 and otherwise labs were normal.  Diagnoses and all orders for this visit:    1. Other fatigue (Primary)  -     Comprehensive metabolic panel  -     Iron  -     Vitamin B12  -     Vitamin D 1,25 dihydroxy    2. Iron deficiency anemia, unspecified iron deficiency anemia type  -     CBC and Differential  -     Iron         Plan:  1.)  Comprehensive metabolic profile, CBC, vitamin D level  2.)  Vitamin B12  3.)  Schedule for a [facility] evaluation to evaluate his short-term memory.           CC: Fatigue (Follow up labs.  Has occasional dizziness and \"quakes\" in his hands.)  .        HPI  History of Present Illness     Subjective   Saleem Aguilar is a 74 y.o. male.      The following portions of the patient's history were reviewed and updated as appropriate: allergies, current medications, past family history, past medical history, past social " history, past surgical history, and problem list.    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, left    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, right    Essential hypertension, benign    Disease of tongue    Gastroesophageal reflux disease    Right shoulder pain    Tremor    Abnormal electrocardiogram    Acute chest pain    Benign essential tremor    Carpal tunnel syndrome, bilateral    Hypertension    Inguinal hernia    Memory loss    PVC (premature ventricular contraction)    Screening for colon cancer       Past Medical History  Past Medical History:   Diagnosis Date    Benign positional vertigo 6/26/2019    Cancer     COPD (chronic obstructive pulmonary disease) 7/23/2018    Disease of tongue 7/17/2020    Essential hypertension, benign 7/9/2012    Gastroesophageal reflux disease 7/17/2020    Major depression, single episode 12/19/2019    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, right 1/3/2017    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 ENDOSCOPY;  Service: General;  Laterality: N/A;  SCREENING  post-- normal    HERNIA REPAIR      MENISCECTOMY Right     PROSTATECTOMY         Family History  History reviewed. No pertinent family history.    Social History  Social History    Tobacco Use      Smoking status: Former        Packs/day: 0.00        Years: 0.5 packs/day for 5.0 years (2.5 ttl pk-yrs)        Types: Cigarettes        Start date: 1971        Quit date: 1976         Years since quittin.5        Passive exposure: Past      Smokeless tobacco: Never       Is the Patient a current tobacco user? No    Allergies  No Known Allergies    Current Medications    Current Outpatient Medications:     amLODIPine (NORVASC) 5 MG tablet, TAKE 1 TABLET BY MOUTH EVERY DAY, Disp: 90 tablet, Rfl: 1    Azelastine HCl 137 MCG/SPRAY solution, SPRAY 1 TO 2 SPRAYS INTO EACH NOSTRIL TWICE A DAY AS NEEDED, Disp: , Rfl:     Biotin 5 MG capsule, Take  by mouth Daily., Disp: , Rfl:     escitalopram (LEXAPRO) 10 MG tablet, TAKE 1 TABLET BY MOUTH EVERY DAY, Disp: 90 tablet, Rfl: 1    fexofenadine (ALLEGRA) 180 MG tablet, TAKE 1 TABLET BY MOUTH EVERY DAY, Disp: 30 tablet, Rfl: 2    fluticasone (FLONASE) 50 MCG/ACT nasal spray, 2 sprays into the nostril(s) as directed by provider Daily., Disp: 9.9 mL, Rfl: 1    Misc Natural Products (OSTEO BI-FLEX TRIPLE STRENGTH PO), Take  by mouth., Disp: , Rfl:     Multiple Vitamins-Minerals (CENTRUM SILVER ADULT 50+ PO), Take  by mouth Daily., Disp: , Rfl:     Omega-3 Fatty Acids (FISH OIL) 1200 MG capsule capsule, Take  by mouth., Disp: , Rfl:     pyridoxine (VITAMIN B-6) 100 MG tablet tablet, Take  by mouth Daily., Disp: , Rfl:     vitamin C (ASCORBIC ACID) 500 MG tablet, Take 1 tablet by mouth Daily., Disp: , Rfl:     VITAMIN D, CHOLECALCIFEROL, PO, Take  by mouth Daily., Disp: , Rfl:     Zinc 50 MG tablet, Take  by mouth Daily., Disp: , Rfl:      Review of System  Review of Systems  I have reviewed and confirmed the accuracy of the ROS as documented by the MA/LPN/RN Tray Padron MD    Vitals:    24 1215   BP: 120/80     Body mass index is 27.91 kg/m².    Objective     Physical Exam  Physical Exam        Tray Padron MD  2024

## 2024-07-11 RX ORDER — FEXOFENADINE HCL 180 MG/1
180 TABLET ORAL DAILY
Qty: 30 TABLET | Refills: 2 | Status: SHIPPED | OUTPATIENT
Start: 2024-07-11

## 2024-08-02 ENCOUNTER — OFFICE VISIT (OUTPATIENT)
Dept: FAMILY MEDICINE CLINIC | Facility: CLINIC | Age: 74
End: 2024-08-02
Payer: MEDICARE

## 2024-08-02 VITALS
BODY MASS INDEX: 27.85 KG/M2 | SYSTOLIC BLOOD PRESSURE: 110 MMHG | HEIGHT: 69 IN | WEIGHT: 188 LBS | DIASTOLIC BLOOD PRESSURE: 76 MMHG

## 2024-08-02 DIAGNOSIS — R41.3 MEMORY LOSS: Primary | ICD-10-CM

## 2024-08-02 PROCEDURE — 1160F RVW MEDS BY RX/DR IN RCRD: CPT | Performed by: INTERNAL MEDICINE

## 2024-08-02 PROCEDURE — 3074F SYST BP LT 130 MM HG: CPT | Performed by: INTERNAL MEDICINE

## 2024-08-02 PROCEDURE — 1125F AMNT PAIN NOTED PAIN PRSNT: CPT | Performed by: INTERNAL MEDICINE

## 2024-08-02 PROCEDURE — 99214 OFFICE O/P EST MOD 30 MIN: CPT | Performed by: INTERNAL MEDICINE

## 2024-08-02 PROCEDURE — 3078F DIAST BP <80 MM HG: CPT | Performed by: INTERNAL MEDICINE

## 2024-08-02 PROCEDURE — 1159F MED LIST DOCD IN RCRD: CPT | Performed by: INTERNAL MEDICINE

## 2024-08-13 NOTE — PROGRESS NOTES
08/02/2024    Assessment & Plan   This pleasant 74-year-old presents at this time with concern about his recent memory.  We administered the Tha cognitive assessment version 8.1.  Patient scored total of 18.    Executive function was 3/5  Naming 3/3  Attention 4/6  Language 1/2  Abstraction 2/2  Delayed recall 2/5  Orientation 6/6    Total score 21    I feel that this indicates the need for further evaluation and monitoring.  We will discuss this with the patient at the follow-up visit.        Total time spent was 30 minutes during which we administered the test, reviewed his record and provided interpretation.  Diagnoses and all orders for this visit:    1. Memory loss (Primary)         Plan:  1.)  Will discuss this at the patient's follow-up visit.  Will consider readministering in 6 months.         CC: Memory Loss (MOCA)  .        HPI  Memory Loss  Pertinent negatives include no chest pain, chills, coughing, fever, nausea or vomiting.        Abel Aguilar is a 74 y.o. male.      The following portions of the patient's history were reviewed and updated as appropriate: allergies, current medications, past family history, past medical history, past social history, past surgical history, and problem list.    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, left    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, right    Essential hypertension, benign    Disease of tongue    Gastroesophageal reflux disease    Right shoulder pain    Tremor    Abnormal electrocardiogram    Acute chest pain    Benign essential tremor    Carpal tunnel syndrome, bilateral    Hypertension    Inguinal hernia    Memory loss    PVC (premature ventricular contraction)    Screening for colon cancer       Past Medical History  Past  Medical History:   Diagnosis Date    Benign positional vertigo 2019    Cancer     COPD (chronic obstructive pulmonary disease) 2018    Disease of tongue 2020    Essential hypertension, benign 2012    Gastroesophageal reflux disease 2020    Major depression, single episode 2019    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, right 1/3/2017    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 ENDOSCOPY;  Service: General;  Laterality: N/A;  SCREENING  post-- normal    HERNIA REPAIR      MENISCECTOMY Right     PROSTATECTOMY         Family History  History reviewed. No pertinent family history.    Social History  Social History    Tobacco Use      Smoking status: Former        Packs/day: 0.00        Years: 0.5 packs/day for 5.0 years (2.5 ttl pk-yrs)        Types: Cigarettes        Start date:         Quit date:         Years since quittin.6        Passive exposure: Past      Smokeless tobacco: Never       Is the Patient a current tobacco user? No    Allergies  No Known Allergies    Current Medications    Current Outpatient Medications:     amLODIPine (NORVASC) 5 MG tablet, TAKE 1 TABLET BY MOUTH EVERY DAY, Disp: 90 tablet, Rfl: 1    Azelastine HCl 137 MCG/SPRAY solution, SPRAY 1 TO 2 SPRAYS INTO EACH NOSTRIL TWICE A DAY AS NEEDED, Disp: , Rfl:     Biotin 5 MG capsule, Take  by mouth Daily., Disp: , Rfl:     escitalopram (LEXAPRO) 10 MG tablet, TAKE 1 TABLET BY MOUTH EVERY DAY, Disp: 90 tablet, Rfl: 1    fexofenadine (ALLEGRA) 180 MG tablet, TAKE 1 TABLET BY MOUTH EVERY DAY, Disp: 30 tablet, Rfl: 2    fluticasone (FLONASE) 50 MCG/ACT nasal spray, 2  sprays into the nostril(s) as directed by provider Daily., Disp: 9.9 mL, Rfl: 1    Misc Natural Products (OSTEO BI-FLEX TRIPLE STRENGTH PO), Take  by mouth., Disp: , Rfl:     Multiple Vitamins-Minerals (CENTRUM SILVER ADULT 50+ PO), Take  by mouth Daily., Disp: , Rfl:     Omega-3 Fatty Acids (FISH OIL) 1200 MG capsule capsule, Take  by mouth., Disp: , Rfl:     pyridoxine (VITAMIN B-6) 100 MG tablet tablet, Take  by mouth Daily., Disp: , Rfl:     vitamin C (ASCORBIC ACID) 500 MG tablet, Take 1 tablet by mouth Daily., Disp: , Rfl:     VITAMIN D, CHOLECALCIFEROL, PO, Take  by mouth Daily., Disp: , Rfl:     Zinc 50 MG tablet, Take  by mouth Daily., Disp: , Rfl:      Review of System  Review of Systems   Constitutional:  Negative for chills and fever.   Respiratory:  Negative for cough and shortness of breath.    Cardiovascular:  Negative for chest pain and palpitations.   Gastrointestinal:  Negative for constipation, diarrhea, nausea and vomiting.   Neurological:  Positive for memory problem. Negative for dizziness and headache.     I have reviewed and confirmed the accuracy of the ROS as documented by the MA/LPN/RN Tray Padron MD    Vitals:    08/02/24 1116   BP: 110/76     Body mass index is 27.76 kg/m².    Objective     Physical Exam  Physical Exam  Constitutional:       General: He is not in acute distress.     Appearance: Normal appearance.   HENT:      Head: Normocephalic and atraumatic.   Cardiovascular:      Rate and Rhythm: Normal rate and regular rhythm.   Pulmonary:      Effort: Pulmonary effort is normal. No respiratory distress.      Breath sounds: Normal breath sounds. No wheezing, rhonchi or rales.   Neurological:      General: No focal deficit present.      Mental Status: He is alert and oriented to person, place, and time.   Psychiatric:         Mood and Affect: Mood normal.         Behavior: Behavior normal.         Thought Content: Thought content normal.         Judgment: Judgment normal.              Tray Padron MD  08/02/2024

## 2024-08-23 ENCOUNTER — OFFICE VISIT (OUTPATIENT)
Dept: FAMILY MEDICINE CLINIC | Facility: CLINIC | Age: 74
End: 2024-08-23
Payer: MEDICARE

## 2024-08-23 VITALS
HEIGHT: 69 IN | BODY MASS INDEX: 27.99 KG/M2 | SYSTOLIC BLOOD PRESSURE: 118 MMHG | DIASTOLIC BLOOD PRESSURE: 76 MMHG | WEIGHT: 189 LBS

## 2024-08-23 DIAGNOSIS — N40.0 BENIGN PROSTATIC HYPERPLASIA WITHOUT LOWER URINARY TRACT SYMPTOMS: ICD-10-CM

## 2024-08-23 DIAGNOSIS — R41.3 DECLINE IN VERBAL MEMORY: Primary | ICD-10-CM

## 2024-08-23 DIAGNOSIS — M67.40 GANGLION CYST: ICD-10-CM

## 2024-08-23 DIAGNOSIS — R41.3 DECLINE IN VERBAL MEMORY: ICD-10-CM

## 2024-08-23 PROCEDURE — 1125F AMNT PAIN NOTED PAIN PRSNT: CPT | Performed by: INTERNAL MEDICINE

## 2024-08-23 PROCEDURE — 99213 OFFICE O/P EST LOW 20 MIN: CPT | Performed by: INTERNAL MEDICINE

## 2024-08-23 PROCEDURE — 3078F DIAST BP <80 MM HG: CPT | Performed by: INTERNAL MEDICINE

## 2024-08-23 PROCEDURE — 3074F SYST BP LT 130 MM HG: CPT | Performed by: INTERNAL MEDICINE

## 2024-08-24 LAB
HIV 1+2 AB+HIV1 P24 AG SERPL QL IA: NON REACTIVE
PSA SERPL-MCNC: <0.1 NG/ML (ref 0–4)
RPR SER QL: NON REACTIVE
TSH SERPL DL<=0.005 MIU/L-ACNC: 0.68 UIU/ML (ref 0.45–4.5)

## 2024-08-27 ENCOUNTER — TELEPHONE (OUTPATIENT)
Dept: FAMILY MEDICINE CLINIC | Facility: CLINIC | Age: 74
End: 2024-08-27
Payer: MEDICARE

## 2024-08-27 NOTE — TELEPHONE ENCOUNTER
----- Message from Tray Padron sent at 8/24/2024  8:21 PM EDT -----  Call patient to notify of results    PSA is less than 0.1.  This is good

## 2024-09-04 NOTE — PROGRESS NOTES
08/23/2024    Assessment & Plan   This 74-year-old patient presents today for follow-up for discussion regarding memory loss.  We administered the MoCA examination to the patient with the last visit his total score was 21.  Also his blood pressure was found to be depressed and he was taken off his lisinopril his blood pressure within 8/2 visit was down to 110/76 initially today was 118/76 in the left arm sitting position standard cuff on repeat by me is 118/82.  I discussed with the patient that his MoCA score was lower than expected and that we will look for causes for that at this point the next consideration would be an MRI of the brain to evaluate for any lesions, hydrocephalus or other problems causing a change in his recent memory.  He also notes a tremor of his right hand when at rest that he relates has been present for the past month or so.   On examination today no tremor is seen.  He ambulates without difficulty.    Patient also complains of a protuberance on his right dorsum of the wrist.  This appears to be a ganglion cyst but will have further evaluation by hand surgery.    Patient also relates that he is concerned about prostate cancer in spite of the fact he had a prostatectomy greater than 15 years ago.PSA done was normal at less than 0.1.    Diagnoses and all orders for this visit:    1. Decline in verbal memory (Primary)  -     TSH  -     HIV-1/O/2 ANTIGEN/ANTIBODY, 4TH GENERATION; Future  -     RPR; Future  -     MRI Brain Without Contrast; Future  -     Ambulatory Referral to Neurology    2. Benign prostatic hyperplasia without lower urinary tract symptoms  -     PSA DIAGNOSTIC ONLY; Future    3. Ganglion cyst  -     Ambulatory Referral to Hand Surgery                  CC: Memory Loss (Follow up MOCA and wrist issue---no other issues)  .        HPI  Memory Loss  Pertinent negatives include no chest pain, chills, coughing, fever, nausea or vomiting.      Subjective   Saleem Aguilar is a 74 y.o. male.       The following portions of the patient's history were reviewed and updated as appropriate: allergies, current medications, past family history, past medical history, past social history, past surgical history, and problem list.    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, left    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, right    Essential hypertension, benign    Disease of tongue    Gastroesophageal reflux disease    Right shoulder pain    Tremor    Abnormal electrocardiogram    Acute chest pain    Benign essential tremor    Carpal tunnel syndrome, bilateral    Hypertension    Inguinal hernia    Memory loss    PVC (premature ventricular contraction)    Screening for colon cancer       Past Medical History  Past Medical History:   Diagnosis Date    Benign positional vertigo 6/26/2019    Cancer     COPD (chronic obstructive pulmonary disease) 7/23/2018    Disease of tongue 7/17/2020    Essential hypertension, benign 7/9/2012    Gastroesophageal reflux disease 7/17/2020    Major depression, single episode 12/19/2019    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, right 1/3/2017    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 No pertinent family history.    Social History  Social History    Tobacco Use       Smoking status: Former        Packs/day: 0.00        Years: 0.5 packs/day for 5.0 years (2.5 ttl pk-yrs)        Types: Cigarettes        Start date:         Quit date:         Years since quittin.7        Passive exposure: Past      Smokeless tobacco: Never       Is the Patient a current tobacco user? No    Allergies  No Known Allergies    Current Medications    Current Outpatient Medications:     amLODIPine (NORVASC) 5 MG tablet, TAKE 1 TABLET BY MOUTH EVERY DAY, Disp: 90 tablet, Rfl: 1    Azelastine HCl 137 MCG/SPRAY solution, SPRAY 1 TO 2 SPRAYS INTO EACH NOSTRIL TWICE A DAY AS NEEDED, Disp: , Rfl:     Biotin 5 MG capsule, Take  by mouth Daily., Disp: , Rfl:     escitalopram (LEXAPRO) 10 MG tablet, TAKE 1 TABLET BY MOUTH EVERY DAY, Disp: 90 tablet, Rfl: 1    fexofenadine (ALLEGRA) 180 MG tablet, TAKE 1 TABLET BY MOUTH EVERY DAY, Disp: 30 tablet, Rfl: 2    fluticasone (FLONASE) 50 MCG/ACT nasal spray, 2 sprays into the nostril(s) as directed by provider Daily., Disp: 9.9 mL, Rfl: 1    Misc Natural Products (OSTEO BI-FLEX TRIPLE STRENGTH PO), Take  by mouth., Disp: , Rfl:     Multiple Vitamins-Minerals (CENTRUM SILVER ADULT 50+ PO), Take  by mouth Daily., Disp: , Rfl:     Omega-3 Fatty Acids (FISH OIL) 1200 MG capsule capsule, Take  by mouth., Disp: , Rfl:     pyridoxine (VITAMIN B-6) 100 MG tablet tablet, Take  by mouth Daily., Disp: , Rfl:     vitamin C (ASCORBIC ACID) 500 MG tablet, Take 1 tablet by mouth Daily., Disp: , Rfl:     VITAMIN D, CHOLECALCIFEROL, PO, Take  by mouth Daily., Disp: , Rfl:     Zinc 50 MG tablet, Take  by mouth Daily., Disp: , Rfl:      Review of System  Review of Systems   Constitutional:  Negative for chills and fever.   Respiratory:  Negative for cough and shortness of breath.    Cardiovascular:  Negative for chest pain and palpitations.   Gastrointestinal:  Negative for constipation, diarrhea, nausea and vomiting.   Neurological:  Positive for memory problem. Negative  for dizziness and headache.   I have reviewed and confirmed the accuracy of the ROS as documented by the MA/LPN/RN Tray Padron MD    Vitals:    08/23/24 1144   BP: 118/76     Body mass index is 27.91 kg/m².    Objective     Physical Exam  Physical Exam  Constitutional:       General: He is not in acute distress.     Appearance: Normal appearance.   HENT:      Head: Normocephalic and atraumatic.   Cardiovascular:      Rate and Rhythm: Normal rate and regular rhythm.   Pulmonary:      Effort: Pulmonary effort is normal. No respiratory distress.      Breath sounds: Normal breath sounds. No wheezing, rhonchi or rales.   Neurological:      General: No focal deficit present.      Mental Status: He is alert and oriented to person, place, and time.   Psychiatric:         Mood and Affect: Mood normal.         Behavior: Behavior normal.         Thought Content: Thought content normal.         Judgment: Judgment normal.         rTay Padron MD  08/23/2024

## 2024-09-06 RX ORDER — AMLODIPINE BESYLATE 5 MG/1
5 TABLET ORAL DAILY
Qty: 90 TABLET | Refills: 1 | Status: SHIPPED | OUTPATIENT
Start: 2024-09-06

## 2024-09-16 ENCOUNTER — OFFICE VISIT (OUTPATIENT)
Dept: NEUROLOGY | Facility: CLINIC | Age: 74
End: 2024-09-16
Payer: MEDICARE

## 2024-09-16 VITALS
SYSTOLIC BLOOD PRESSURE: 120 MMHG | WEIGHT: 191.4 LBS | OXYGEN SATURATION: 98 % | DIASTOLIC BLOOD PRESSURE: 78 MMHG | HEART RATE: 70 BPM | HEIGHT: 69 IN | BODY MASS INDEX: 28.35 KG/M2

## 2024-09-16 DIAGNOSIS — R42 EPISODIC LIGHTHEADEDNESS: ICD-10-CM

## 2024-09-16 DIAGNOSIS — G31.84 MILD COGNITIVE IMPAIRMENT: Primary | ICD-10-CM

## 2024-09-16 DIAGNOSIS — G25.0 ESSENTIAL TREMOR: ICD-10-CM

## 2024-09-16 DIAGNOSIS — G56.23 ULNAR NEUROPATHY OF BOTH UPPER EXTREMITIES: ICD-10-CM

## 2024-10-03 ENCOUNTER — HOSPITAL ENCOUNTER (OUTPATIENT)
Facility: HOSPITAL | Age: 74
Discharge: HOME | End: 2024-10-03
Admitting: INTERNAL MEDICINE
Payer: MEDICARE

## 2024-10-03 PROCEDURE — 70551 MRI BRAIN STEM W/O DYE: CPT

## 2024-10-04 ENCOUNTER — OFFICE VISIT (OUTPATIENT)
Dept: FAMILY MEDICINE CLINIC | Facility: CLINIC | Age: 74
End: 2024-10-04
Payer: MEDICARE

## 2024-10-04 VITALS
HEIGHT: 69 IN | BODY MASS INDEX: 28.29 KG/M2 | DIASTOLIC BLOOD PRESSURE: 80 MMHG | WEIGHT: 191 LBS | SYSTOLIC BLOOD PRESSURE: 120 MMHG

## 2024-10-04 DIAGNOSIS — R41.3 DECLINE IN VERBAL MEMORY: Primary | ICD-10-CM

## 2024-10-04 DIAGNOSIS — G25.0 BENIGN ESSENTIAL TREMOR: ICD-10-CM

## 2024-10-04 DIAGNOSIS — I10 PRIMARY HYPERTENSION: ICD-10-CM

## 2024-10-04 DIAGNOSIS — R41.3 MEMORY LOSS: ICD-10-CM

## 2024-10-04 PROCEDURE — 99214 OFFICE O/P EST MOD 30 MIN: CPT | Performed by: INTERNAL MEDICINE

## 2024-10-04 PROCEDURE — 3074F SYST BP LT 130 MM HG: CPT | Performed by: INTERNAL MEDICINE

## 2024-10-04 PROCEDURE — 1159F MED LIST DOCD IN RCRD: CPT | Performed by: INTERNAL MEDICINE

## 2024-10-04 PROCEDURE — 3078F DIAST BP <80 MM HG: CPT | Performed by: INTERNAL MEDICINE

## 2024-10-04 PROCEDURE — 1125F AMNT PAIN NOTED PAIN PRSNT: CPT | Performed by: INTERNAL MEDICINE

## 2024-10-04 PROCEDURE — 1160F RVW MEDS BY RX/DR IN RCRD: CPT | Performed by: INTERNAL MEDICINE

## 2024-10-04 NOTE — PROGRESS NOTES
10/04/2024    Assessment & Plan       This 74-year-old patient presents today for follow-up of memory problems.  In the interim of visits he was seen by neurologist Dr. Felipe for evaluation of his tremor, his recent memory problems and his arm tingling.    The patient had expressed ongoing problems with recent memory and after our administration of the Tha cognitive assessment and the patient scoring 21/30 he was sent for evaluation from neurology.  Dr. Felipe administered the MMSE and felt that the score in his office was not consistent with the MoCA evaluation and he is asked for neuropsychological testing to further delineate the patient's memory concerns.  Dr. Felipe was able to evaluate the records from Dr. Owens the patient's previous neurologist and found that the patient had previously been prescribed propranolol for a benign essential tremor although the reasoning for he is just discontinuation was not expressed.  In any case Dr. Felipe felt that the patient did have a benign intention tremor and recommended that propranolol be considered in treatment.  Patient's blood pressure has been well-controlled with amlodipine it should be noted.  After discussion with the patient we find that he is extremely concerned about the tremor and is willing to have medication changed if it means treating this particular problem.  With that and then we will taper him off the amlodipine and start propranolol and continue to monitor his blood pressure on that agent.    Dr. Felipe will continue to monitor the patient's possible ulnar neuropathy but for now the tremor and memory problems take precedent.    Patient's blood pressure was initially 90/64 on recheck by me in the left arm sitting position standard cuff was 120/80.    The patient was given a calendar depicting his tapering of amlodipine from 5 mg to 2.5 mg for 1 week and then off with the start of propranolol 10 mg daily we will see him in follow-up in 3 to 4  weeks.      Diagnoses and all orders for this visit:    1. Decline in verbal memory (Primary)    2. Memory loss    3. Benign essential tremor    4. Primary hypertension         Plan:  1.)  Follow-up in 3 to 4 weeks  2.)  Restart propranolol 10 mg daily--note the patient had been on propranolol until February 2024.         CC: Memory Loss (MOCA)  .        HPI  History of Present Illness     Subjective   Saleem Aguilar is a 74 y.o. male.      The following portions of the patient's history were reviewed and updated as appropriate: allergies, current medications, past family history, past medical history, past social history, past surgical history, and problem list.    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, left    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, right    Essential hypertension, benign    Disease of tongue    Gastroesophageal reflux disease    Right shoulder pain    Tremor    Abnormal electrocardiogram    Acute chest pain    Benign essential tremor    Carpal tunnel syndrome, bilateral    Hypertension    Inguinal hernia    Memory loss    PVC (premature ventricular contraction)    Screening for colon cancer       Past Medical History  Past Medical History:   Diagnosis Date    Benign positional vertigo 6/26/2019    Cancer     COPD (chronic obstructive pulmonary disease) 7/23/2018    Disease of tongue 7/17/2020    Essential hypertension, benign 7/9/2012    Gastroesophageal reflux disease 7/17/2020    Major depression, single episode 12/19/2019    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, right 1/3/2017    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 No pertinent family history.    Social History  Social History    Tobacco Use      Smoking status: Former        Packs/day: 0.00        Years: 0.5 packs/day for 5.0 years (2.5 ttl pk-yrs)        Types: Cigarettes        Start date:         Quit date:         Years since quittin.7        Passive exposure: Past      Smokeless tobacco: Never       Is the Patient a current tobacco user? No    Allergies  No Known Allergies    Current Medications    Current Outpatient Medications:     amLODIPine (NORVASC) 5 MG tablet, TAKE 1 TABLET BY MOUTH EVERY DAY, Disp: 90 tablet, Rfl: 1    Azelastine HCl 137 MCG/SPRAY solution, SPRAY 1 TO 2 SPRAYS INTO EACH NOSTRIL TWICE A DAY AS NEEDED, Disp: , Rfl:     escitalopram (LEXAPRO) 10 MG tablet, TAKE 1 TABLET BY MOUTH EVERY DAY, Disp: 90 tablet, Rfl: 1    fexofenadine (ALLEGRA) 180 MG tablet, TAKE 1 TABLET BY MOUTH EVERY DAY, Disp: 30 tablet, Rfl: 2    fluticasone (FLONASE) 50 MCG/ACT nasal spray, 2 sprays into the nostril(s) as directed by provider Daily., Disp: 9.9 mL, Rfl: 1    Misc Natural Products (OSTEO BI-FLEX TRIPLE STRENGTH PO), Take  by mouth., Disp: , Rfl:     Multiple Vitamins-Minerals (CENTRUM SILVER ADULT 50+ PO), Take  by mouth Daily., Disp: , Rfl:     Omega-3 Fatty Acids (FISH OIL) 1200 MG capsule capsule, Take  by mouth., Disp: , Rfl:     pyridoxine (VITAMIN B-6) 100 MG tablet tablet, Take  by mouth Daily., Disp: , Rfl:     vitamin C (ASCORBIC ACID) 500 MG tablet, Take 1 tablet by mouth Daily., Disp: , Rfl:      Review of System  Review of Systems   Constitutional:  Negative for chills and fever.    Respiratory:  Negative for cough and shortness of breath.    Cardiovascular:  Negative for chest pain and palpitations.   Gastrointestinal:  Negative for constipation, diarrhea, nausea and vomiting.   Neurological:  Negative for dizziness and headache.     I have reviewed and confirmed the accuracy of the ROS as documented by the MA/LPN/RN Tray Padron MD    Vitals:    10/04/24 1408   BP: 120/80     Body mass index is 28.21 kg/m².    Objective     Physical Exam  Physical Exam  Constitutional:       General: He is not in acute distress.     Appearance: Normal appearance.   HENT:      Head: Normocephalic and atraumatic.   Cardiovascular:      Rate and Rhythm: Normal rate and regular rhythm.   Pulmonary:      Effort: Pulmonary effort is normal. No respiratory distress.      Breath sounds: Normal breath sounds. No wheezing, rhonchi or rales.   Neurological:      General: No focal deficit present.      Mental Status: He is alert and oriented to person, place, and time.   Psychiatric:         Mood and Affect: Mood normal.         Behavior: Behavior normal.         Thought Content: Thought content normal.         Judgment: Judgment normal.             Tray Padron MD  10/04/2024

## 2024-10-11 ENCOUNTER — TELEPHONE (OUTPATIENT)
Dept: FAMILY MEDICINE CLINIC | Facility: CLINIC | Age: 74
End: 2024-10-11
Payer: MEDICARE

## 2024-10-11 NOTE — TELEPHONE ENCOUNTER
----- Message from Tray Padron sent at 10/10/2024 10:03 PM EDT -----  Call patient to notify of results    Recent MRI of the brain looks essentially unchanged from the past

## 2024-10-21 ENCOUNTER — OFFICE VISIT (OUTPATIENT)
Dept: NEUROLOGY | Facility: CLINIC | Age: 74
End: 2024-10-21
Payer: MEDICARE

## 2024-10-21 VITALS
HEART RATE: 67 BPM | WEIGHT: 191 LBS | SYSTOLIC BLOOD PRESSURE: 124 MMHG | BODY MASS INDEX: 28.21 KG/M2 | DIASTOLIC BLOOD PRESSURE: 88 MMHG | OXYGEN SATURATION: 95 %

## 2024-10-21 DIAGNOSIS — G25.0 ESSENTIAL TREMOR: ICD-10-CM

## 2024-10-21 DIAGNOSIS — G56.23 ULNAR NEUROPATHY OF BOTH UPPER EXTREMITIES: Primary | ICD-10-CM

## 2024-10-21 DIAGNOSIS — M67.431 GANGLION CYST OF DORSUM OF RIGHT WRIST: ICD-10-CM

## 2024-10-21 PROCEDURE — 3074F SYST BP LT 130 MM HG: CPT | Performed by: PSYCHIATRY & NEUROLOGY

## 2024-10-21 PROCEDURE — 1159F MED LIST DOCD IN RCRD: CPT | Performed by: PSYCHIATRY & NEUROLOGY

## 2024-10-21 PROCEDURE — 1160F RVW MEDS BY RX/DR IN RCRD: CPT | Performed by: PSYCHIATRY & NEUROLOGY

## 2024-10-21 PROCEDURE — 3079F DIAST BP 80-89 MM HG: CPT | Performed by: PSYCHIATRY & NEUROLOGY

## 2024-10-21 PROCEDURE — 99213 OFFICE O/P EST LOW 20 MIN: CPT | Performed by: PSYCHIATRY & NEUROLOGY

## 2024-10-21 RX ORDER — PROPRANOLOL HCL 10 MG
10 TABLET ORAL DAILY
Qty: 90 TABLET | Refills: 1 | Status: SHIPPED | OUTPATIENT
Start: 2024-10-21

## 2024-10-21 NOTE — PROGRESS NOTES
"Date of visit: 10/21/2024  Patient ID: Saleem Aguilar  Age: 74 y.o.     Chief compliant:   Chief Complaint   Patient presents with    Mild cognitive impairment         Interval history (10/21/2024):   Patient denies any new or progressive symptoms regarding his tremor, dizziness, or memory.  Reviewed his EMG appointment which is pending on 11/11/2024.  Wife reports neuropsychological testing is scheduled on 11/16/2024.  Reviewed documentation from Dr. Padron, PCP, who established a plan to wean off amlodipine then begin propranolol.  Patient has this plan written down on a paper in his pocket, but then questions why he is taking amlodipine still.  Appears propranolol was not filled at his pharmacy.  Reviewed new plan to start propranolol after 1 week fitting with recommendations by Dr. Padron.        Initial HPI (9/16/2024):   \"Patient has been referred from his PCP regarding multiple issues as listed below.     Tremor - He has issues with \"quakes\" that he notices when he is active. Rarely in the left hand, but moreso in the right hand which is his dominant hand. This has been ongoing for a while, but worse over the last year. Not occurring at rest. Occurs with writing, using tools. No significant issues with eating. He was previously on propranolol (10 mg?). Per chart review, he was previously prescribed primidone He denies any issues and is unsure why this was stopped. No particular triggers.     Dizziness - Going on for the last year. He gets some lightheadedness when he's active. He has to stop and drink water. Has occurred when he is pushing a lawnmower or a grocery cart in the store. It is lightheaded as though he might pass out and feels winded. He has had this happen when he stands up too fast. It only lasts a few moments to minutes. He drinks a lot of water throughout the days.  He has occasional missteps, but no other falls. It is affecting his activity as he is less active. It occurs most days.  He rolled out " "of bed in the middle of the night. Then upon getting up and walking, he lost his balance and hit his head. He denies any vivid dreams. He is not acting out his dreams, but has been talking loudly in his sleep.      Memory - he occasionally won't remember the name of individuals. He will get lost in conversation. He needs to write down appointments. This has been moreso in the past few years. Per Dr. 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 He is driving without issue. Continues to manage the finances. He had a MoCA score of 21 on 8/2/2024 per PCP note.      He reports numbness on the outside of both hands and 4th and 5th digits. Not particularly worse in the morning. It seems to be random.  He reports being told he has carpal tunnel after an EMG when seeing his previous neurologist (2016).      Family history - no neurological history \"       Review of Systems   Neurological:  Positive for dizziness and numbness. Negative for speech difficulty, weakness and headaches.   Psychiatric/Behavioral:  Positive for confusion. Negative for behavioral problems.         The following portions of the patient's history were reviewed and updated as appropriate: allergies, current medications, past family history, past medical history, past social history, past surgical history and problem list.    Vitals:    10/21/24 1326   BP: 124/88   Pulse: 67   SpO2: 95%       Neurological Exam  Mental Status  Awake, alert and oriented to person, place and time. Speech is normal. Language is fluent with no aphasia. Attention and concentration are normal. Fund of knowledge is appropriate for level of education.    Cranial Nerves  CN II: Visual acuity is normal. Visual fields full to confrontation.  CN III, IV, VI: Extraocular movements intact bilaterally. Pupils equal round and reactive to light bilaterally.  CN V: " Facial sensation is normal.  CN VII: Full and symmetric facial movement.  CN XI: Shoulder shrug strength is normal.    Motor   Strength is 5/5 in all four extremities except as noted.    Sensory  Light touch is normal in upper and lower extremities.     Coordination    Finger-to-nose, rapid alternating movements and heel-to-shin normal bilaterally without dysmetria.    Gait  Casual gait is normal including stance, stride, and arm swing.      Assessment/Plan:    Portions of this assessment have been copied from previous documentation which has been thoroughly reviewed and updated as appropriate.    Saleem Aguilar is a 74 y.o. male presenting with multiple complaints and multiple separate, unrelated neurological diagnoses.     Patient presents with forgetfulness largely consistent with amnestic MCI with no major limitation in his function, and similar symptoms described dating back to 2016.  MoCA score of 21/30 seem out of proportion to his symptoms with MMSE score of 25/30.  Patient has a pending referral to neuropsychology for further detailed evaluation.  Patient wishes to avoid medications if possible for any treatment and will defer at this time until further evaluation.     He has prior EMG evidence of bilateral ulnar neuropathy also in 2016, and continues to report numbness particular of his fourth and fifth digit which fits within this range.  No additional signs consistent with carpal tunnel syndrome.  Will explore further with an EMG to grade severity.  Counseled patient regarding precautions while sleeping to avoid further compression.     Patient's action greater than postural tremor appears consistent with benign essential tremor.  He previously tolerated treatment with propranolol at a very low dose of 10 mg daily, but this was switched for management of his blood pressure.  Discussed with patient that propranolol would be a good option to trial again due to his response and has a plan for transition from  amlodipine to propranolol, but this was successfully followed up on.  Patient provided a prescription for propranolol and plan for weaning amlodipine after 1 week.  Alternative consideration of topiramate         Diagnoses and all orders for this visit:    1. 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 Ganglion cyst of dorsum of right wrist  -     Ambulatory Referral to Orthopedic Surgery    3. Essential tremor  -     propranolol (INDERAL) 10 MG tablet; Take 1 tablet by mouth Daily.  Dispense: 90 tablet; Refill: 1    Follow-up results of EMG on 11/11/2024  Follow-up results of neuropsychiatric testing on 11/16/2024       Mike Felipe MD    I spent 20 minutes caring for this patient on this date of service. This time includes time spent by me in the following activities as necessary: preparing for the visit, reviewing tests, medical records and previous visits, obtaining and/or reviewing a separately obtained history, performing a medically appropriate exam and/or evaluation, counseling and educating the patient, and/or communicating with other healthcare professionals, documenting information in the medical record, independently interpreting results and communicating that information with the patient, and developing a medically appropriate treatment plan with consideration of other conditions, medications, and treatments.

## 2024-10-21 NOTE — PATIENT INSTRUCTIONS
Starting tomorrow on 10/22/2024, please reduce your amlodipine to 2.5 mg daily for 1 week, then STOP. Starting on 10/28/2024 (Monday), begin taking propranolol once daily

## 2024-10-31 ENCOUNTER — OFFICE VISIT (OUTPATIENT)
Dept: FAMILY MEDICINE CLINIC | Facility: CLINIC | Age: 74
End: 2024-10-31
Payer: MEDICARE

## 2024-10-31 VITALS
WEIGHT: 186 LBS | HEIGHT: 69 IN | SYSTOLIC BLOOD PRESSURE: 120 MMHG | BODY MASS INDEX: 27.55 KG/M2 | DIASTOLIC BLOOD PRESSURE: 78 MMHG

## 2024-10-31 DIAGNOSIS — I10 PRIMARY HYPERTENSION: ICD-10-CM

## 2024-10-31 DIAGNOSIS — G25.0 BENIGN ESSENTIAL TREMOR: Primary | ICD-10-CM

## 2024-10-31 PROCEDURE — 1125F AMNT PAIN NOTED PAIN PRSNT: CPT | Performed by: INTERNAL MEDICINE

## 2024-10-31 PROCEDURE — 99213 OFFICE O/P EST LOW 20 MIN: CPT | Performed by: INTERNAL MEDICINE

## 2024-10-31 PROCEDURE — 3074F SYST BP LT 130 MM HG: CPT | Performed by: INTERNAL MEDICINE

## 2024-10-31 PROCEDURE — 1160F RVW MEDS BY RX/DR IN RCRD: CPT | Performed by: INTERNAL MEDICINE

## 2024-10-31 PROCEDURE — 1159F MED LIST DOCD IN RCRD: CPT | Performed by: INTERNAL MEDICINE

## 2024-10-31 PROCEDURE — 3078F DIAST BP <80 MM HG: CPT | Performed by: INTERNAL MEDICINE

## 2024-11-11 ENCOUNTER — HOSPITAL ENCOUNTER (OUTPATIENT)
Dept: NEUROLOGY | Facility: HOSPITAL | Age: 74
Discharge: HOME | End: 2024-11-11
Admitting: PSYCHIATRY & NEUROLOGY
Payer: MEDICARE

## 2024-11-11 DIAGNOSIS — G56.23 ULNAR NEUROPATHY OF BOTH UPPER EXTREMITIES: ICD-10-CM

## 2024-11-11 PROCEDURE — 95911 NRV CNDJ TEST 9-10 STUDIES: CPT | Performed by: PSYCHIATRY & NEUROLOGY

## 2024-11-11 PROCEDURE — 95886 MUSC TEST DONE W/N TEST COMP: CPT | Performed by: PSYCHIATRY & NEUROLOGY

## 2024-11-11 NOTE — PROCEDURES
"EMG and Nerve Conduction Studies    I.      Instrument used: Neuromax 1002  II.     Please see data sheets for tabular summary of NCS and details on methods, temperatures and lab standards.   III.    EMG muscles tested for upper extremity studies include the deltoid, biceps, triceps, pronator teres, extensor digitorum communis, first dorsal interosseous and abductor pollicis brevis.    IV.   EMG muscles tested for lower extremity studies include the vastus lateralis, tibialis anterior, peroneus longus, medial gastrocnemius and extensor digitorum brevis.    V.    Additional muscles tested as needed.  Paraspinal muscles tested as needed.   VI.   Please see data sheets for tabular summary of EMG findings.   VII. The complete report includes the data sheets.      Indication: Numbness in the hands  History: 74-year-old -American male with numbness in the hands mostly on the ulnar aspect.  The left is worse than the right.  Denies elbow injury histories.  Symptoms started about a year and a half ago.  He also has a tremor in his hands the right side is worse than the left.  He denies history of diabetes or thyroid disease.      Ht: 68 inches  Wt: 185 pounds  HbA1C: No results found for: \"HGBA1C\"  TSH:   Lab Results   Component Value Date    TSH 0.677 08/23/2024       Technical summary:  Nerve conduction studies were obtained in both arms.  Skin temperatures were at least 32 °C measured on the palms.  Needle examination was obtained on selected muscles in both arms.    Results:  1.  Prolonged median antidromic sensory distal latencies bilaterally at 3.8 ms on the left and 4.4 ms on the right with a normal amplitude on the left but a low amplitude on the right at 16 µV.  2.  Prolonged ulnar antidromic sensory distal latencies bilaterally at 3.7 ms on the left and 4.0 ms on the right with low amplitudes of 10 µV on the left and 8.7 µV on the right.  3.  Normal left radial sensory distal latency and amplitude.  4.  " Normal left median motor distal latency at 4.3 ms with a normal conduction velocity and amplitudes.  Mildly prolonged right median motor distal latency at 4.4 ms with normal conduction velocity and amplitudes.  5.  Slow ulnar motor conduction velocities in the short segments across the elbows at 41.1 m/s on the left and 38 m/s on the right.  The velocities below the elbows were normal.  The distal latencies and amplitudes were normal.  6.  Needle examination of selected muscles in both arms showed normal insertional activities throughout.  There was question of an increased number of large motor units in the first dorsal interosseous muscles but recruitment was essentially full.  All other muscles tested showed normal motor units and recruitment patterns.    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  In addition there are bilateral median neuropathies at the wrists, mild on the left and mild to moderate on the right.  There was no evidence of a cervical radiculopathy on either side by this study.  This combination with for abnormal nerves is consistent with a more diffuse peripheral neuropathy.  Study results were discussed with the patient.    Shalom Robledo M.D.              Dictated utilizing Dragon dictation.

## 2024-11-14 ENCOUNTER — OFFICE VISIT (OUTPATIENT)
Dept: FAMILY MEDICINE CLINIC | Facility: CLINIC | Age: 74
End: 2024-11-14
Payer: MEDICARE

## 2024-11-14 VITALS
DIASTOLIC BLOOD PRESSURE: 88 MMHG | HEIGHT: 69 IN | WEIGHT: 186 LBS | BODY MASS INDEX: 27.55 KG/M2 | SYSTOLIC BLOOD PRESSURE: 112 MMHG

## 2024-11-14 DIAGNOSIS — S01.01XA SCALP LACERATION, INITIAL ENCOUNTER: ICD-10-CM

## 2024-11-14 DIAGNOSIS — G25.0 BENIGN ESSENTIAL TREMOR: ICD-10-CM

## 2024-11-14 DIAGNOSIS — S02.2XXA CLOSED FRACTURE OF NASAL BONE, INITIAL ENCOUNTER: Primary | ICD-10-CM

## 2024-11-14 RX ORDER — LIDOCAINE 50 MG/G
1 PATCH TOPICAL EVERY 24 HOURS
COMMUNITY
Start: 2024-11-14

## 2024-11-14 NOTE — PROGRESS NOTES
10/31/2024    Assessment & Plan       This pleasant 74-year-old presents today for follow-up of hypertension and sinusitis.  He was seen in urgent care 3 days or so ago with sinusitis and has taken that medication as prescribed.  He was also seen in the interim by neurology for his tremor of his hands.  He was started on propranolol which he relates has disappeared at this point.  Somewhat but his blood pressure has decreased from his last visit of 133/84  2 days to today's initially 120/78.  On recheck by me however it was 96/70 in the left arm sitting position standard cuff.  He denies any unusual fatigue.  We note he has lost 4 pounds from his last visit down to 186 pounds.    He relates that his sinusitis has cleared also.      Plan:  1.)  Decrease propranolol to 5 mg p.o. daily  2.)  Follow-up in 1 month to reevaluate blood pressure and hand tremor.    Diagnoses and all orders for this visit:    1. Benign essential tremor (Primary)    2. Primary hypertension                  CC: Hypertension (F/U med change.) and pansinusitis (Follow up from urgent care 10/28/24.)  .        HPI  Hypertension  Pertinent negatives include no chest pain, palpitations or shortness of breath.        Subjective   Saleem Aguilar is a 74 y.o. male.      The following portions of the patient's history were reviewed and updated as appropriate: allergies, current medications, past family history, past medical history, past social history, past surgical history, and problem list.    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, left    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, right    Essential hypertension, benign    Disease of tongue    Gastroesophageal reflux disease    Right shoulder pain    Tremor    Abnormal electrocardiogram     Acute chest pain    Benign essential tremor    Carpal tunnel syndrome, bilateral    Hypertension    Inguinal hernia    Memory loss    PVC (premature ventricular contraction)    Screening for colon cancer       Past Medical History  Past Medical History:   Diagnosis Date    Benign positional vertigo 2019    Cancer     COPD (chronic obstructive pulmonary disease) 2018    Disease of tongue 2020    Essential hypertension, benign 2012    Gastroesophageal reflux disease 2020    Major depression, single episode 2019    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, right 1/3/2017    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 No pertinent family history.    Social History  Social History    Tobacco Use      Smoking status: Former        Packs/day: 0.00        Years: 0.5 packs/day for 5.0 years (2.5 ttl pk-yrs)        Types: Cigarettes        Start date:         Quit date:         Years since quittin.9        Passive exposure: Past      Smokeless tobacco: Never       Is the Patient a current tobacco user? No    Allergies  No Known Allergies    Current Medications    Current Outpatient Medications:     Azelastine HCl 137 MCG/SPRAY solution, SPRAY 1 TO 2 SPRAYS INTO EACH NOSTRIL TWICE A DAY AS NEEDED, Disp: , Rfl:     azithromycin (ZITHROMAX) 250 MG tablet, Take 2 tabs on first day.  Take 1 tab a day for additional 4 days., Disp: 6 tablet, Rfl: 0    benzonatate (TESSALON) 200 MG  capsule, Take 1 capsule by mouth 3 (Three) Times a Day As Needed for Cough., Disp: 15 capsule, Rfl: 0    escitalopram (LEXAPRO) 10 MG tablet, TAKE 1 TABLET BY MOUTH EVERY DAY, Disp: 90 tablet, Rfl: 1    fluticasone (FLONASE) 50 MCG/ACT nasal spray, 2 sprays into the nostril(s) as directed by provider Daily., Disp: 9.9 mL, Rfl: 1    Misc Natural Products (OSTEO BI-FLEX TRIPLE STRENGTH PO), Take  by mouth., Disp: , Rfl:     Multiple Vitamins-Minerals (CENTRUM SILVER ADULT 50+ PO), Take  by mouth Daily., Disp: , Rfl:     Omega-3 Fatty Acids (FISH OIL) 1200 MG capsule capsule, Take  by mouth., Disp: , Rfl:     propranolol (INDERAL) 10 MG tablet, Take 1 tablet by mouth Daily., Disp: 90 tablet, Rfl: 1    pyridoxine (VITAMIN B-6) 100 MG tablet tablet, Take  by mouth Daily., Disp: , Rfl:     vitamin C (ASCORBIC ACID) 500 MG tablet, Take 1 tablet by mouth Daily., Disp: , Rfl:      Review of System  Review of Systems   Constitutional: Negative.  Negative for chills and fever.   HENT: Negative.     Eyes: Negative.    Respiratory: Negative.  Negative for cough and shortness of breath.    Cardiovascular: Negative.  Negative for chest pain and palpitations.   Gastrointestinal: Negative.  Negative for constipation, diarrhea, nausea and vomiting.   Endocrine: Negative.    Genitourinary: Negative.    Musculoskeletal: Negative.    Skin: Negative.    Allergic/Immunologic: Negative.    Neurological:  Negative for dizziness and headache.   Hematological: Negative.    Psychiatric/Behavioral: Negative.       I have reviewed and confirmed the accuracy of the ROS as documented by the MA/LPN/RN Tray Padron MD    Vitals:    10/31/24 1103   BP: 120/78     Body mass index is 27.47 kg/m².    Objective     Physical Exam  Physical Exam        Tray Padron MD  10/31/2024

## 2024-11-14 NOTE — PROGRESS NOTES
11/14/2024    Assessment & Plan   This pleasant 74-year-old presents today for follow-up of hypertension.  He relates he is feeling well this morning he fell out of bed and sustained bilateral nasal fracture.  He has no difficulty breathing at this time.  He has no bleeding.  He also received a laceration of the scalp he has a bandage in place and there is no evidence of bleeding at this point.  He relates he has a headache over the contusion area.  CT scan of the head showed no Evidence of acute cortical infarction mass lesion or hemorrhage and there is no acute calvarial fracture.    Patient's blood pressure shows good control at 112/88 in the left arm sitting position standard cuff.  His BMI is 27.5.    Referral will be made to ENT for evaluation and treatment of the nasal fracture.  Will evaluate the patient in the next several days for suture removal.    Diagnoses and all orders for this visit:    1. Closed fracture of nasal bone, initial encounter (Primary)  -     Ambulatory Referral to ENT (Otolaryngology)    2. Benign essential tremor    3. Scalp laceration, initial encounter                  CC: Fall (Follow up from [facility] 11/14/24.)  .        HPI  History of Present Illness     Subjective   Saleem Aguilar is a 74 y.o. male.      The following portions of the patient's history were reviewed and updated as appropriate: allergies, current medications, past family history, past medical history, past social history, past surgical history, and problem list.    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, left    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, right    Essential hypertension, benign    Disease of tongue    Gastroesophageal reflux disease    Right shoulder pain    Tremor    Abnormal  electrocardiogram    Acute chest pain    Benign essential tremor    Carpal tunnel syndrome, bilateral    Hypertension    Inguinal hernia    Memory loss    PVC (premature ventricular contraction)    Screening for colon cancer       Past Medical History  Past Medical History:   Diagnosis Date    Benign positional vertigo 2019    Cancer     COPD (chronic obstructive pulmonary disease) 2018    Disease of tongue 2020    Essential hypertension, benign 2012    Gastroesophageal reflux disease 2020    Major depression, single episode 2019    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, right 1/3/2017    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 No pertinent family history.    Social History  Social History    Tobacco Use      Smoking status: Former        Packs/day: 0.00        Years: 0.5 packs/day for 5.0 years (2.5 ttl pk-yrs)        Types: Cigarettes        Start date:         Quit date:         Years since quittin.9        Passive exposure: Past      Smokeless tobacco: Never       Is the Patient a current tobacco user? No    Allergies  No Known Allergies    Current Medications    Current Outpatient Medications:     Azelastine HCl 137 MCG/SPRAY solution, SPRAY 1 TO 2 SPRAYS INTO EACH NOSTRIL TWICE A DAY AS NEEDED, Disp: , Rfl:     azithromycin (ZITHROMAX) 250 MG tablet, Take 2 tabs on first day.  Take 1 tab a day for additional 4 days., Disp: 6 tablet, Rfl: 0    benzonatate  (TESSALON) 200 MG capsule, Take 1 capsule by mouth 3 (Three) Times a Day As Needed for Cough., Disp: 15 capsule, Rfl: 0    escitalopram (LEXAPRO) 10 MG tablet, TAKE 1 TABLET BY MOUTH EVERY DAY, Disp: 90 tablet, Rfl: 1    fluticasone (FLONASE) 50 MCG/ACT nasal spray, 2 sprays into the nostril(s) as directed by provider Daily., Disp: 9.9 mL, Rfl: 1    lidocaine (LIDODERM) 5 %, Place 1 patch on the skin as directed by provider Daily., Disp: , Rfl:     Misc Natural Products (OSTEO BI-FLEX TRIPLE STRENGTH PO), Take  by mouth., Disp: , Rfl:     Multiple Vitamins-Minerals (CENTRUM SILVER ADULT 50+ PO), Take  by mouth Daily., Disp: , Rfl:     Omega-3 Fatty Acids (FISH OIL) 1200 MG capsule capsule, Take  by mouth., Disp: , Rfl:     propranolol (INDERAL) 10 MG tablet, Take 1 tablet by mouth Daily., Disp: 90 tablet, Rfl: 1    pyridoxine (VITAMIN B-6) 100 MG tablet tablet, Take  by mouth Daily., Disp: , Rfl:     vitamin C (ASCORBIC ACID) 500 MG tablet, Take 1 tablet by mouth Daily., Disp: , Rfl:      Review of System  Review of Systems   Constitutional:  Negative for chills and fever.   Respiratory:  Negative for cough and shortness of breath.    Cardiovascular:  Negative for chest pain and palpitations.   Gastrointestinal:  Negative for constipation, diarrhea, nausea and vomiting.   Neurological:  Negative for dizziness and headache.     I have reviewed and confirmed the accuracy of the ROS as documented by the MA/LPN/RN Tray Padron MD    Vitals:    11/14/24 1340   BP: 112/88     Body mass index is 27.47 kg/m².    Objective     Physical Exam  Physical Exam  Constitutional:       General: He is not in acute distress.     Appearance: Normal appearance.   HENT:      Head: Normocephalic and atraumatic.   Cardiovascular:      Rate and Rhythm: Normal rate and regular rhythm.   Pulmonary:      Effort: Pulmonary effort is normal. No respiratory distress.      Breath sounds: Normal breath sounds. No wheezing, rhonchi or rales.    Neurological:      General: No focal deficit present.      Mental Status: He is alert and oriented to person, place, and time.   Psychiatric:         Mood and Affect: Mood normal.         Behavior: Behavior normal.         Thought Content: Thought content normal.         Judgment: Judgment normal.             Tray Padron MD  11/14/2024

## 2024-11-19 ENCOUNTER — OFFICE VISIT (OUTPATIENT)
Dept: FAMILY MEDICINE CLINIC | Facility: CLINIC | Age: 74
End: 2024-11-19
Payer: MEDICARE

## 2024-11-19 VITALS
DIASTOLIC BLOOD PRESSURE: 84 MMHG | HEIGHT: 69 IN | BODY MASS INDEX: 27.64 KG/M2 | OXYGEN SATURATION: 98 % | SYSTOLIC BLOOD PRESSURE: 130 MMHG | HEART RATE: 68 BPM | WEIGHT: 186.6 LBS

## 2024-11-19 DIAGNOSIS — S01.81XD FOREHEAD LACERATION, SUBSEQUENT ENCOUNTER: Primary | ICD-10-CM

## 2024-11-19 DIAGNOSIS — Z48.02 ENCOUNTER FOR REMOVAL OF SUTURES: ICD-10-CM

## 2024-11-19 PROCEDURE — 1160F RVW MEDS BY RX/DR IN RCRD: CPT | Performed by: NURSE PRACTITIONER

## 2024-11-19 PROCEDURE — 1125F AMNT PAIN NOTED PAIN PRSNT: CPT | Performed by: NURSE PRACTITIONER

## 2024-11-19 PROCEDURE — 3075F SYST BP GE 130 - 139MM HG: CPT | Performed by: NURSE PRACTITIONER

## 2024-11-19 PROCEDURE — 1159F MED LIST DOCD IN RCRD: CPT | Performed by: NURSE PRACTITIONER

## 2024-11-19 PROCEDURE — 15853 REMOVAL SUTR/STAPL XREQ ANES: CPT | Performed by: NURSE PRACTITIONER

## 2024-11-19 PROCEDURE — 3079F DIAST BP 80-89 MM HG: CPT | Performed by: NURSE PRACTITIONER

## 2024-11-19 PROCEDURE — 99213 OFFICE O/P EST LOW 20 MIN: CPT | Performed by: NURSE PRACTITIONER

## 2024-11-19 NOTE — PROGRESS NOTES
"Chief Complaint  Suture / Staple Removal    Subjective        HPI   History of Present Illness      Saleem presents to [facility] to have sutures removed from his center forehead.  He denies bleeding or drainage from laceration site.  He denies any other complaints today.          Objective   Vital Signs:   Vitals:    11/19/24 1127   BP: 130/84   BP Location: Left arm   Patient Position: Sitting   Cuff Size: Adult   Pulse: 68   SpO2: 98%   Weight: 84.6 kg (186 lb 9.6 oz)   Height: 175.3 cm (69.02\")            1/11/2024     2:45 PM   PHQ-2/PHQ-9 Depression Screening   Retired Little Interest or Pleasure in Doing Things 0-->not at all   Retired Feeling Down, Depressed or Hopeless 0-->not at all   Retired PHQ-9: Brief Depression Severity Measure Score 0                 Physical Exam  Vitals and nursing note reviewed.   Constitutional:       Appearance: Normal appearance.   HENT:      Head: Normocephalic and atraumatic.        Comments: Healing laceration with 5 sutures, wound approximated, no erythema, no drainage.  Mild swelling approx 5mm out from wound edges.  Neurological:      Mental Status: He is alert.          Result Review :         Suture Removal    Date/Time: 11/19/2024 12:10 PM    Performed by: Courtney Lennon APRN  Authorized by: Courtney Lennon APRN  Consent: Verbal consent obtained.  Risks and benefits: risks, benefits and alternatives were discussed  Consent given by: patient  Patient understanding: patient states understanding of the procedure being performed  Body area: head/neck  Location details: forehead  Wound Appearance: clean  Sutures Removed: 5  Patient tolerance: patient tolerated the procedure well with no immediate complications            Assessment and Plan    Assessment & Plan  Forehead laceration, subsequent encounter  Improving.       Encounter for removal of sutures  5 sutures removed, patient tolerated well.             Follow Up   Return if symptoms " worsen or fail to improve.  Patient was given instructions and counseling regarding his condition or for health maintenance advice. Please see specific information pulled into the AVS if appropriate.

## 2025-01-07 ENCOUNTER — TELEPHONE (OUTPATIENT)
Dept: NEUROLOGY | Facility: CLINIC | Age: 75
End: 2025-01-07
Payer: MEDICARE

## 2025-01-07 NOTE — TELEPHONE ENCOUNTER
Answering service message:    From: ADRIANO GREWAL: [phone]  Pt Name:  : 50  RM #:  Msg: CANCEL APPT  AT 1PM AND PLEASE       CALL TO RESCHEDULE    Appt is canceled, please reach out to reschedule

## 2025-01-09 ENCOUNTER — TELEPHONE (OUTPATIENT)
Dept: NEUROLOGY | Facility: CLINIC | Age: 75
End: 2025-01-09

## 2025-01-09 ENCOUNTER — OFFICE VISIT (OUTPATIENT)
Dept: NEUROLOGY | Facility: CLINIC | Age: 75
End: 2025-01-09
Payer: MEDICARE

## 2025-01-09 VITALS
BODY MASS INDEX: 25.55 KG/M2 | OXYGEN SATURATION: 98 % | WEIGHT: 173 LBS | HEART RATE: 86 BPM | SYSTOLIC BLOOD PRESSURE: 120 MMHG | DIASTOLIC BLOOD PRESSURE: 70 MMHG

## 2025-01-09 DIAGNOSIS — G31.84 MILD COGNITIVE IMPAIRMENT: ICD-10-CM

## 2025-01-09 DIAGNOSIS — G25.0 ESSENTIAL TREMOR: Primary | ICD-10-CM

## 2025-01-09 DIAGNOSIS — G56.23 ULNAR NEUROPATHY OF BOTH UPPER EXTREMITIES: ICD-10-CM

## 2025-01-09 NOTE — PROGRESS NOTES
Date of visit: 1/9/2025   Patient ID: Saleem Aguilar  Age: 74 y.o.   Chief compliant:   Chief Complaint   Patient presents with    Ulnar neuropathy of both upper extremities     Portions of this assessment have been copied from previous documentation which has been thoroughly reviewed and updated as appropriate.       Diagnoses and all orders for this visit:    1. Ulnar neuropathy of both upper extremities (Primary)    2. Essential tremor    3. Mild cognitive impairment         Saleem Aguilar is a 74 y.o. male presenting with multiple complaints and multiple separate, unrelated neurological diagnoses.     Patient presents with forgetfulness largely consistent with amnestic MCI with no major limitation in his function, and similar symptoms described dating back to 2016.  MoCA score of 21/30 seem out of proportion to his symptoms with MMSE score of 25/30.  Patient has a pending referral to neuropsychology for further detailed evaluation.  Patient wishes to avoid medications if possible for any treatment and will defer at this time until further evaluation.     He has prior EMG evidence of bilateral ulnar neuropathy also in 2016, and continues to report numbness particular of his fourth and fifth digit which fits within this range.  No additional signs consistent with carpal tunnel syndrome.  His EMG notes bilateral median and bilateral ulnar neuropathy which raises question of a peripheral neuropathy.  He has no findings consistent with a length-dependent peripheral neuropathy in the lower extremities.     Patient's action greater than postural tremor appears consistent with benign essential tremor.  He previously tolerated treatment with propranolol at a very low dose of 10 mg daily and was started back on this, but appears he had low blood pressures with systolic of 93.  He has not had any increase in dizziness with starting this medication.      Plan:  Counseled patient regarding precautions while sleeping to avoid further  compression.  He does not wish to consider any surgical options at this time.  Message sent to his primary care provider regarding coordinating dose adjustment of propranolol.  Will increase again to 10 mg at this appointment.  If his blood pressure is low at his PCP follow-up, likely he should be off this medication entirely and can consider alternatives such as primidone or a low-dose of gabapentin.  Continue follow-up with Dr. Huddleston with neuropsychology    Mike Felipe MD          Interval history (1/9/2025):   Reviewed EMG. He reports it has slightly improved. He has some trouble opening jars. It is not constant. Numbness is more noticed on waking if he slept on it wrong or can occur randomly during the day. It doesn't hurt. His ganglion cyst has gone away. He did not see the hand surgeon.  He saw Dr. Huddleston for neurocognitive testing. It sounds like he has had an initial appointment, but formal testing is upcoming in the next month.  He is taking propranolol 5 mg.  Appears off other medications for blood pressure currently, although he is personally unclear what blood pressure medications he takes.  Appears he saw Dr. Padron and there was concern for low blood pressures, so his dose was reduced from 10 mg to 5 mg.   He feels like he is not having any issues with either dose.  He rolled out of bed and hit his head on the nightstand since has seen an ENT    Interval history (10/21/2024):   Patient denies any new or progressive symptoms regarding his tremor, dizziness, or memory.  Reviewed his EMG appointment which is pending on 11/11/2024.  Wife reports neuropsychological testing is scheduled on 11/16/2024.  Reviewed documentation from Dr. Padron, PCP, who established a plan to wean off amlodipine then begin propranolol.  Patient has this plan written down on a paper in his pocket, but then questions why he is taking amlodipine still.  Appears propranolol was not filled at his pharmacy.  Reviewed new plan to  "start propranolol after 1 week fitting with recommendations by Dr. Padron.        Initial HPI (9/16/2024):   \"Patient has been referred from his PCP regarding multiple issues as listed below.     Tremor - He has issues with \"quakes\" that he notices when he is active. Rarely in the left hand, but moreso in the right hand which is his dominant hand. This has been ongoing for a while, but worse over the last year. Not occurring at rest. Occurs with writing, using tools. No significant issues with eating. He was previously on propranolol (10 mg?). Per chart review, he was previously prescribed primidone He denies any issues and is unsure why this was stopped. No particular triggers.     Dizziness - Going on for the last year. He gets some lightheadedness when he's active. He has to stop and drink water. Has occurred when he is pushing a lawnmower or a grocery cart in the store. It is lightheaded as though he might pass out and feels winded. He has had this happen when he stands up too fast. It only lasts a few moments to minutes. He drinks a lot of water throughout the days.  He has occasional missteps, but no other falls. It is affecting his activity as he is less active. It occurs most days.  He rolled out of bed in the middle of the night. Then upon getting up and walking, he lost his balance and hit his head. He denies any vivid dreams. He is not acting out his dreams, but has been talking loudly in his sleep.      Memory - he occasionally won't remember the name of individuals. He will get lost in conversation. He needs to write down appointments. This has been moreso in the past few years. Per Dr. 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 He is driving without issue. Continues to manage the finances. He had a MoCA score of 21 on 8/2/2024 per PCP note.      He reports numbness on the outside " "of both hands and 4th and 5th digits. Not particularly worse in the morning. It seems to be random.  He reports being told he has carpal tunnel after an EMG when seeing his previous neurologist (2016).      Family history - no neurological history \"      Review of Systems   Neurological:  Positive for tremors. Negative for light-headedness and headaches.        The following portions of the patient's history were reviewed and updated as appropriate: allergies, current medications, past family history, past medical history, past social history, past surgical history and problem list.    Vitals:    01/09/25 1611   BP: 120/70   Pulse: 86   SpO2: 98%       Neurological Exam  Mental Status  Awake, alert and oriented to person, place and time. Speech is normal. Language is fluent with no aphasia. Attention and concentration are normal. Fund of knowledge is appropriate for level of education.    Cranial Nerves  CN II: Visual acuity is normal. Visual fields full to confrontation.  CN III, IV, VI: Extraocular movements intact bilaterally. Pupils equal round and reactive to light bilaterally.  CN V: Facial sensation is normal.  CN VII: Full and symmetric facial movement.  CN XI: Shoulder shrug strength is normal.    Motor   Paratonia. The following abnormal movements were seen: Minimal postural tremor, moderate action tremor. No resting tremor  .   Strength is 5/5 in all four extremities except as noted.    Sensory  Light touch is normal in upper and lower extremities.     Coordination    Appropriate hand-eye coordination.    Gait  Casual gait is normal including stance, stride, and arm swing.    EMG reviewed    I spent 38 minutes caring for this patient on this date of service. This time includes time spent by me in the following activities as necessary: preparing for the visit, reviewing tests, medical records and previous visits, obtaining and/or reviewing a separately obtained history, performing a medically appropriate " exam and/or evaluation, counseling and educating the patient, and/or communicating with other healthcare professionals, documenting information in the medical record, independently interpreting results and communicating that information with the patient, and developing a medically appropriate treatment plan with consideration of other conditions, medications, and treatments.

## 2025-01-09 NOTE — TELEPHONE ENCOUNTER
Per provider request, called pt to offer earlier appt today.  No answer  LVM requesting pt call us back and let us know if he would like to come in earlier

## 2025-01-13 ENCOUNTER — OFFICE VISIT (OUTPATIENT)
Dept: FAMILY MEDICINE CLINIC | Facility: CLINIC | Age: 75
End: 2025-01-13
Payer: MEDICARE

## 2025-01-13 VITALS
HEIGHT: 69 IN | SYSTOLIC BLOOD PRESSURE: 100 MMHG | WEIGHT: 190 LBS | BODY MASS INDEX: 28.14 KG/M2 | DIASTOLIC BLOOD PRESSURE: 70 MMHG

## 2025-01-13 DIAGNOSIS — Z00.00 MEDICARE ANNUAL WELLNESS VISIT, SUBSEQUENT: Primary | ICD-10-CM

## 2025-01-13 DIAGNOSIS — D50.9 IRON DEFICIENCY ANEMIA, UNSPECIFIED IRON DEFICIENCY ANEMIA TYPE: ICD-10-CM

## 2025-01-13 DIAGNOSIS — I10 PRIMARY HYPERTENSION: ICD-10-CM

## 2025-01-13 DIAGNOSIS — E78.5 HYPERLIPIDEMIA, UNSPECIFIED HYPERLIPIDEMIA TYPE: ICD-10-CM

## 2025-01-13 DIAGNOSIS — R35.1 NOCTURIA: ICD-10-CM

## 2025-01-13 PROCEDURE — 1160F RVW MEDS BY RX/DR IN RCRD: CPT | Performed by: INTERNAL MEDICINE

## 2025-01-13 PROCEDURE — 1159F MED LIST DOCD IN RCRD: CPT | Performed by: INTERNAL MEDICINE

## 2025-01-13 PROCEDURE — 1126F AMNT PAIN NOTED NONE PRSNT: CPT | Performed by: INTERNAL MEDICINE

## 2025-01-13 PROCEDURE — G0439 PPPS, SUBSEQ VISIT: HCPCS | Performed by: INTERNAL MEDICINE

## 2025-01-13 PROCEDURE — 3078F DIAST BP <80 MM HG: CPT | Performed by: INTERNAL MEDICINE

## 2025-01-13 PROCEDURE — 1170F FXNL STATUS ASSESSED: CPT | Performed by: INTERNAL MEDICINE

## 2025-01-13 PROCEDURE — 3074F SYST BP LT 130 MM HG: CPT | Performed by: INTERNAL MEDICINE

## 2025-01-13 NOTE — PROGRESS NOTES
Subjective   The ABCs of the Annual Wellness Visit  Medicare Wellness Visit      Saleem Aguilar is a 74 y.o. patient who presents for a Medicare Wellness Visit.    The following portions of the patient's history were reviewed and   updated as appropriate: allergies, current medications, past family history, past medical history, past social history, past surgical history, and problem list.    Compared to one year ago, the patient's physical   health is worse.  Compared to one year ago, the patient's mental   health is worse.    Recent Hospitalizations:  He was admitted within the past 365 days at  hospital.     Current Medical Providers:  Patient Care Team:  Tray Padron MD as PCP - General (Internal Medicine)    Outpatient Medications Prior to Visit   Medication Sig Dispense Refill    Azelastine HCl 137 MCG/SPRAY solution SPRAY 1 TO 2 SPRAYS INTO EACH NOSTRIL TWICE A DAY AS NEEDED      fluticasone (FLONASE) 50 MCG/ACT nasal spray 2 sprays into the nostril(s) as directed by provider Daily. 9.9 mL 1    Multiple Vitamins-Minerals (CENTRUM SILVER ADULT 50+ PO) Take  by mouth Daily.      Omega-3 Fatty Acids (FISH OIL) 1200 MG capsule capsule Take  by mouth.      propranolol (INDERAL) 10 MG tablet Take 1 tablet by mouth Daily. 90 tablet 1    pyridoxine (VITAMIN B-6) 100 MG tablet tablet Take  by mouth Daily.      vitamin C (ASCORBIC ACID) 500 MG tablet Take 1 tablet by mouth Daily.       No facility-administered medications prior to visit.     No opioid medication identified on active medication list. I have reviewed chart for other potential  high risk medication/s and harmful drug interactions in the elderly.      Aspirin is not on active medication list.  Aspirin use is not indicated based on review of current medical condition/s. Risk of harm outweighs potential benefits.  .    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  ACP discussion was held with the patient during this visit. Patient has an advance directive in EMR which is still valid.             Objective   Vitals:    25 0943   BP: 100/70   Weight: 86.2 kg (190 lb)   Height: 175.3 cm (69\")   PainSc: 0-No pain       Estimated body mass index is 28.06 kg/m² as calculated from the following:    Height as of this encounter: 175.3 cm (69\").    Weight as of this encounter: 86.2 kg (190 lb).                Does the patient have evidence of cognitive impairment? No                                                                                                Health  Risk Assessment    Smoking Status:  Social History     Tobacco Use   Smoking Status Former    Current packs/day: 0.00    Average packs/day: 0.5 packs/day for 5.0 years (2.5 ttl pk-yrs)    Types: Cigarettes    Start date:     Quit date:     Years since quittin.0    Passive exposure: Past   Smokeless Tobacco Never     Alcohol Consumption:  Social History     Substance and Sexual Activity   Alcohol Use Never       Fall Risk Screen  STEADI Fall Risk Assessment was completed, and patient is at HIGH risk for falls. Assessment completed on:2025    Depression Screening   Little interest or pleasure in doing things? Several days   Feeling down, depressed, or hopeless? Not at all   PHQ-2 Total Score 1      Health Habits and Functional and " Cognitive Screenin/13/2025     9:53 AM   Functional & Cognitive Status   Do you have difficulty preparing food and eating? No   Do you have difficulty bathing yourself, getting dressed or grooming yourself? No   Do you have difficulty using the toilet? No   Do you have difficulty moving around from place to place? No   Do you have trouble with steps or getting out of a bed or a chair? No   Current Diet Well Balanced Diet   Dental Exam Not up to date   Eye Exam Not up to date   Exercise (times per week) 0 times per week   Current Exercises Include No Regular Exercise   Do you need help using the phone?  No   Are you deaf or do you have serious difficulty hearing?  No   Do you need help to go to places out of walking distance? No   Do you need help shopping? No   Do you need help preparing meals?  No   Do you need help with housework?  No   Do you need help with laundry? No   Do you need help taking your medications? No   Do you need help managing money? No   Do you ever drive or ride in a car without wearing a seat belt? No   Have you felt unusual stress, anger or loneliness in the last month? No   Who do you live with? Spouse   If you need help, do you have trouble finding someone available to you? No   Have you been bothered in the last four weeks by sexual problems? No   Do you have difficulty concentrating, remembering or making decisions? Yes           Age-appropriate Screening Schedule:  Refer to the list below for future screening recommendations based on patient's age, sex and/or medical conditions. Orders for these recommended tests are listed in the plan section. The patient has been provided with a written plan.    Health Maintenance List  Health Maintenance   Topic Date Due    LIPID PANEL  01/15/2025    ZOSTER VACCINE (1 of 2) 2025 (Originally 2000)    ANNUAL WELLNESS VISIT  2026    BMI FOLLOWUP  2026    TDAP/TD VACCINES (2 - Td or Tdap) 2028    COLORECTAL CANCER  "SCREENING  09/16/2030    HEPATITIS C SCREENING  Completed    COVID-19 Vaccine  Completed    INFLUENZA VACCINE  Completed    Pneumococcal Vaccine 65+  Completed    AAA SCREEN ONCE  Completed                                                                                                                                                CMS Preventative Services Quick Reference  Risk Factors Identified During Encounter  None Identified    The above risks/problems have been discussed with the patient.  Pertinent information has been shared with the patient in the After Visit Summary.  An After Visit Summary and PPPS were made available to the patient.    Follow Up:   Next Medicare Wellness visit to be scheduled in 1 year.         Additional E&M Note during same encounter follows:  Patient has additional, significant, and separately identifiable condition(s)/problem(s) that require work above and beyond the Medicare Wellness Visit     Chief Complaint  Medicare Wellness-subsequent    Subjective   HPI      Review of Systems   Constitutional:  Negative for chills and fever.   Respiratory:  Negative for cough and shortness of breath.    Cardiovascular:  Negative for chest pain and palpitations.   Gastrointestinal:  Negative for constipation, diarrhea, nausea and vomiting.   Neurological:  Negative for dizziness.              Objective   Vital Signs:  /70   Ht 175.3 cm (69\")   Wt 86.2 kg (190 lb)   BMI 28.06 kg/m²   Physical Exam  Constitutional:       General: He is not in acute distress.     Appearance: Normal appearance.   HENT:      Head: Normocephalic and atraumatic.   Cardiovascular:      Rate and Rhythm: Normal rate and regular rhythm.   Pulmonary:      Effort: Pulmonary effort is normal. No respiratory distress.      Breath sounds: Normal breath sounds. No wheezing, rhonchi or rales.   Neurological:      General: No focal deficit present.      Mental Status: He is alert and oriented to person, place, and time. "   Psychiatric:         Mood and Affect: Mood normal.         Behavior: Behavior normal.         Thought Content: Thought content normal.         Judgment: Judgment normal.                 Assessment and Plan     This pleasant 74-year-old presents at this time for Medicare wellness review he relates in the interim of visits he was seen by Dr. Felipe, neurologist who wanted him to restart his propranolol medication.  It was much confusion from the patient regarding which medicine he was on which medication had been tapered that he is amlodipine versus propranolol.  He does not have his medication with him for verification.  Will schedule him for follow-up visit to evaluate changes in his medication including amlodipine propranolol in relation to the tremor.    Patient is here today for Medicare wellness review.    Regarding anticipatory guidance he does not denies chest pain or shortness of breath.       We discussed with him the importance of keeping his LDL Cholesterol less than 100.  Review of his labs from 1/15/2024 showed that his LDL was 91 with a triglyceride normal at 62 and a normal total cholesterol at 153.  We will give him a low-cholesterol diet sheet for implementation at our direction.Will recheck his lipid profile today.    The 10-year ASCVD risk score (Bhaskar RUFFIN, et al., 2019) is: 13%    Values used to calculate the score:      Age: 74 years      Sex: Male      Is Non- : Yes      Diabetic: No      Tobacco smoker: No      Systolic Blood Pressure: 100 mmHg      Is BP treated: Yes      HDL Cholesterol: 49 mg/dL      Total Cholesterol: 153 mg/dL   .        Will repeat his lipid profile.            No orders of the defined types were placed in this encounter.            Follow Up   No follow-ups on file.  Patient was given instructions and counseling regarding his condition or for health maintenance advice. Please see specific information pulled into the AVS if appropriate.

## 2025-01-27 ENCOUNTER — OFFICE VISIT (OUTPATIENT)
Dept: FAMILY MEDICINE CLINIC | Facility: CLINIC | Age: 75
End: 2025-01-27
Payer: MEDICARE

## 2025-01-27 VITALS
DIASTOLIC BLOOD PRESSURE: 80 MMHG | BODY MASS INDEX: 27.7 KG/M2 | SYSTOLIC BLOOD PRESSURE: 130 MMHG | WEIGHT: 187 LBS | HEIGHT: 69 IN

## 2025-01-27 DIAGNOSIS — E78.5 HYPERLIPIDEMIA, UNSPECIFIED HYPERLIPIDEMIA TYPE: ICD-10-CM

## 2025-01-27 DIAGNOSIS — S02.2XXA CLOSED FRACTURE OF NASAL BONE, INITIAL ENCOUNTER: ICD-10-CM

## 2025-01-27 DIAGNOSIS — I10 PRIMARY HYPERTENSION: Primary | ICD-10-CM

## 2025-01-27 LAB
ALBUMIN SERPL-MCNC: 4 G/DL (ref 3.5–5.2)
ALBUMIN/GLOB SERPL: 1.2 G/DL
ALP SERPL-CCNC: 122 U/L (ref 39–117)
ALT SERPL-CCNC: 21 U/L (ref 1–41)
AST SERPL-CCNC: 22 U/L (ref 1–40)
BASOPHILS # BLD AUTO: 0.04 10*3/MM3 (ref 0–0.2)
BASOPHILS NFR BLD AUTO: 1 % (ref 0–1.5)
BILIRUB SERPL-MCNC: 0.4 MG/DL (ref 0–1.2)
BUN SERPL-MCNC: 12 MG/DL (ref 8–23)
BUN/CREAT SERPL: 13.8 (ref 7–25)
CALCIUM SERPL-MCNC: 10.9 MG/DL (ref 8.6–10.5)
CHLORIDE SERPL-SCNC: 104 MMOL/L (ref 98–107)
CHOLEST SERPL-MCNC: 137 MG/DL (ref 0–200)
CHOLEST/HDLC SERPL: 2.45 {RATIO}
CO2 SERPL-SCNC: 28 MMOL/L (ref 22–29)
CREAT SERPL-MCNC: 0.87 MG/DL (ref 0.76–1.27)
EGFRCR SERPLBLD CKD-EPI 2021: 90.5 ML/MIN/1.73
EOSINOPHIL # BLD AUTO: 0.14 10*3/MM3 (ref 0–0.4)
EOSINOPHIL NFR BLD AUTO: 3.5 % (ref 0.3–6.2)
ERYTHROCYTE [DISTWIDTH] IN BLOOD BY AUTOMATED COUNT: 13.4 % (ref 12.3–15.4)
GLOBULIN SER CALC-MCNC: 3.4 GM/DL
GLUCOSE SERPL-MCNC: 122 MG/DL (ref 65–99)
HCT VFR BLD AUTO: 41.8 % (ref 37.5–51)
HDLC SERPL-MCNC: 56 MG/DL (ref 40–60)
HGB BLD-MCNC: 13.9 G/DL (ref 13–17.7)
IMM GRANULOCYTES # BLD AUTO: 0.01 10*3/MM3 (ref 0–0.05)
IMM GRANULOCYTES NFR BLD AUTO: 0.3 % (ref 0–0.5)
LDLC SERPL CALC-MCNC: 66 MG/DL (ref 0–100)
LYMPHOCYTES # BLD AUTO: 1.7 10*3/MM3 (ref 0.7–3.1)
LYMPHOCYTES NFR BLD AUTO: 42.6 % (ref 19.6–45.3)
MCH RBC QN AUTO: 28.8 PG (ref 26.6–33)
MCHC RBC AUTO-ENTMCNC: 33.3 G/DL (ref 31.5–35.7)
MCV RBC AUTO: 86.7 FL (ref 79–97)
MONOCYTES # BLD AUTO: 0.35 10*3/MM3 (ref 0.1–0.9)
MONOCYTES NFR BLD AUTO: 8.8 % (ref 5–12)
NEUTROPHILS # BLD AUTO: 1.75 10*3/MM3 (ref 1.7–7)
NEUTROPHILS NFR BLD AUTO: 43.8 % (ref 42.7–76)
NRBC BLD AUTO-RTO: 0 /100 WBC (ref 0–0.2)
PLATELET # BLD AUTO: 285 10*3/MM3 (ref 140–450)
POTASSIUM SERPL-SCNC: 4.2 MMOL/L (ref 3.5–5.2)
PROT SERPL-MCNC: 7.4 G/DL (ref 6–8.5)
RBC # BLD AUTO: 4.82 10*6/MM3 (ref 4.14–5.8)
SODIUM SERPL-SCNC: 140 MMOL/L (ref 136–145)
TRIGL SERPL-MCNC: 76 MG/DL (ref 0–150)
VLDLC SERPL CALC-MCNC: 15 MG/DL (ref 5–40)
WBC # BLD AUTO: 3.99 10*3/MM3 (ref 3.4–10.8)

## 2025-01-27 PROCEDURE — 1126F AMNT PAIN NOTED NONE PRSNT: CPT | Performed by: INTERNAL MEDICINE

## 2025-01-27 PROCEDURE — G2211 COMPLEX E/M VISIT ADD ON: HCPCS | Performed by: INTERNAL MEDICINE

## 2025-01-27 PROCEDURE — 3075F SYST BP GE 130 - 139MM HG: CPT | Performed by: INTERNAL MEDICINE

## 2025-01-27 PROCEDURE — 3079F DIAST BP 80-89 MM HG: CPT | Performed by: INTERNAL MEDICINE

## 2025-01-27 PROCEDURE — 99214 OFFICE O/P EST MOD 30 MIN: CPT | Performed by: INTERNAL MEDICINE

## 2025-01-27 RX ORDER — FEXOFENADINE HCL 180 MG/1
1 TABLET ORAL DAILY
COMMUNITY
Start: 2025-01-15

## 2025-01-27 RX ORDER — AMLODIPINE BESYLATE 5 MG/1
5 TABLET ORAL DAILY
COMMUNITY

## 2025-01-27 RX ORDER — GINSENG 100 MG
CAPSULE ORAL
COMMUNITY

## 2025-01-27 NOTE — PROGRESS NOTES
01/27/2025    Assessment & Plan     Diagnoses and all orders for this visit:    1. Primary hypertension (Primary)    2. Hyperlipidemia, unspecified hyperlipidemia type    3. Closed fracture of nasal bone, initial encounter      Assessment & Plan  1. Nasal obstruction.  He was previously evaluated by Dr. You, an ENT specialist, who recommended a follow-up visit after 1 month if nasal breathing difficulties persisted. He continues to report difficulty breathing through his nose. Afrin was prescribed for nasal congestion associated with a nondisplaced nasal fracture. A referral to Dr. You will be initiated for further evaluation.    2. Hypertension.  His blood pressure is elevated at 146/90, likely due to not taking his medication today in preparation for fasting labs. He is currently on amlodipine 5 mg. Fasting lipid profile will be obtained.    3. Tremor.  He was previously seen by neurology for a tremor and was started on propranolol. However, due to complaints of weakness and tiredness, the dosage was reduced to 10 mg orally once daily. He reports a decrease in tremor severity but is experiencing shortness of breath.    4. Hyperlipidemia.  His LDL cholesterol was 91 last year, which is within the target range of less than 100. A recheck of his cholesterol levels is needed this year. He is advised to avoid butter and other high-cholesterol foods.    Follow-up  The patient will follow up in the next several weeks for hypertension management.    Results  Laboratory Studies  LDL cholesterol was 91 last year.                CC: Hypertension (F/U---no other issues)  .        HPI  History of Present Illness   History of Present Illness  The patient is a 62-year-old male who presents today for follow-up.    He reports an improvement in his overall health status compared to previous observations. He was able to attend a service yesterday. His dietary intake includes butter. For dinner last night, he had baked  chicken, rice, vegetables including broccoli, cauliflower, carrots, and macaroni and cheese.    He continues to experience intermittent nasal obstruction, with the ability to breathe through one nostril at times but not the other. This pattern appears to be random.    He is currently on a regimen of amlodipine 5 mg for blood pressure management.    He is also taking propranolol 5 mg as prescribed by his neurologist for tremor control.    MEDICATIONS  Current: Amlodipine, propranolol    Abel Aguilar is a 74 y.o. male.      The following portions of the patient's history were reviewed and updated as appropriate: allergies, current medications, past family history, past medical history, past social history, past surgical history, and problem list.    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, left    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, right    Essential hypertension, benign    Disease of tongue    Gastroesophageal reflux disease    Right shoulder pain    Tremor    Abnormal electrocardiogram    Acute chest pain    Benign essential tremor    Carpal tunnel syndrome, bilateral    Hypertension    Inguinal hernia    Memory loss    PVC (premature ventricular contraction)    Screening for colon cancer       Past Medical History  Past Medical History:   Diagnosis Date    Benign positional vertigo 06/26/2019    Cancer     COPD (chronic obstructive pulmonary disease) 07/23/2018    Disease of tongue 07/17/2020    Essential hypertension, benign 07/09/2012    Gastroesophageal reflux disease 07/17/2020    Major depression, single episode 12/19/2019    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, right 01/03/2017    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 No pertinent family history.    Social History  Social History    Tobacco Use      Smoking status: Former        Packs/day: 0.00        Years: 0.5 packs/day for 5.0 years (2.5 ttl pk-yrs)        Types: Cigarettes        Start date:         Quit date:         Years since quittin.1        Passive exposure: Past      Smokeless tobacco: Never       Is the Patient a current tobacco user? No    Allergies  No Known Allergies    Current Medications    Current Outpatient Medications:     amLODIPine (NORVASC) 5 MG tablet, Take 1 tablet by mouth Daily., Disp: , Rfl:     Azelastine HCl 137 MCG/SPRAY solution, SPRAY 1 TO 2 SPRAYS INTO EACH NOSTRIL TWICE A DAY AS NEEDED, Disp: , Rfl:     fexofenadine (ALLEGRA) 180 MG tablet, Take 1 tablet by mouth Daily., Disp: , Rfl:     fluticasone (FLONASE) 50 MCG/ACT nasal spray, 2 sprays into the nostril(s) as directed by provider Daily., Disp: 9.9 mL, Rfl: 1    Multiple Vitamins-Minerals (CENTRUM SILVER ADULT 50+ PO), Take  by mouth Daily., Disp: , Rfl:     Omega-3 Fatty Acids (FISH OIL) 1200 MG capsule capsule, Take  by mouth., Disp: , Rfl:     propranolol (INDERAL) 10 MG tablet, Take 1 tablet by mouth Daily., Disp: 90 tablet, Rfl: 1    pyridoxine (VITAMIN B-6) 100 MG tablet tablet, Take  by mouth Daily., Disp: , Rfl:     vitamin C (ASCORBIC ACID) 500 MG tablet, Take 1 tablet by mouth Daily., Disp: , Rfl:     Zinc 50 MG tablet, Take  by mouth., Disp: , Rfl:      Review of System  Review of Systems   Constitutional:   Negative for chills and fever.   Respiratory:  Negative for cough and shortness of breath.    Cardiovascular:  Negative for chest pain and palpitations.   Gastrointestinal:  Negative for constipation, diarrhea, nausea and vomiting.   Neurological:  Negative for dizziness and headache.   I have reviewed and confirmed the accuracy of the ROS as documented by the MA/LPN/RN Tray Padron MD    Vitals:    01/27/25 1005   BP: 130/80     Body mass index is 27.62 kg/m².    Objective     Physical Exam  Physical Exam  Constitutional:       General: He is not in acute distress.     Appearance: Normal appearance.   HENT:      Head: Normocephalic and atraumatic.   Cardiovascular:      Rate and Rhythm: Normal rate and regular rhythm.   Pulmonary:      Effort: Pulmonary effort is normal. No respiratory distress.      Breath sounds: Normal breath sounds. No wheezing, rhonchi or rales.   Neurological:      General: No focal deficit present.      Mental Status: He is alert and oriented to person, place, and time.   Psychiatric:         Mood and Affect: Mood normal.         Behavior: Behavior normal.         Thought Content: Thought content normal.         Judgment: Judgment normal.       Patient or patient representative verbalized consent for the use of Ambient Listening during the visit with  Tray Padron MD for chart documentation. 1/27/2025  11:28 EST    Tray Padron MD  01/27/2025

## 2025-01-28 LAB
APPEARANCE UR: CLEAR
BACTERIA #/AREA URNS HPF: NORMAL /[HPF]
BILIRUB UR QL STRIP: NEGATIVE
CASTS URNS QL MICRO: NORMAL /LPF
COLOR UR: YELLOW
EPI CELLS #/AREA URNS HPF: NORMAL /HPF (ref 0–10)
GLUCOSE UR QL STRIP: NEGATIVE
HGB UR QL STRIP: NEGATIVE
KETONES UR QL STRIP: NEGATIVE
LEUKOCYTE ESTERASE UR QL STRIP: NEGATIVE
MICRO URNS: NORMAL
MICRO URNS: NORMAL
NITRITE UR QL STRIP: NEGATIVE
PH UR STRIP: 7 [PH] (ref 5–7.5)
PROT UR QL STRIP: NEGATIVE
RBC #/AREA URNS HPF: NORMAL /HPF (ref 0–2)
SP GR UR STRIP: 1.01 (ref 1–1.03)
URINALYSIS REFLEX: NORMAL
UROBILINOGEN UR STRIP-MCNC: 0.2 MG/DL (ref 0.2–1)
WBC #/AREA URNS HPF: NORMAL /HPF (ref 0–5)

## 2025-01-29 ENCOUNTER — TELEPHONE (OUTPATIENT)
Dept: FAMILY MEDICINE CLINIC | Facility: CLINIC | Age: 75
End: 2025-01-29
Payer: MEDICARE

## 2025-01-29 NOTE — TELEPHONE ENCOUNTER
----- Message from Tray Padron sent at 1/28/2025 10:58 PM EST -----  Call patient to notify of results    Inform the patient that his recent laboratory test were essentially within normal limits

## 2025-02-10 ENCOUNTER — OFFICE VISIT (OUTPATIENT)
Dept: FAMILY MEDICINE CLINIC | Facility: CLINIC | Age: 75
End: 2025-02-10
Payer: MEDICARE

## 2025-02-10 VITALS
WEIGHT: 190 LBS | SYSTOLIC BLOOD PRESSURE: 110 MMHG | BODY MASS INDEX: 28.14 KG/M2 | DIASTOLIC BLOOD PRESSURE: 70 MMHG | HEIGHT: 69 IN

## 2025-02-10 DIAGNOSIS — I10 PRIMARY HYPERTENSION: ICD-10-CM

## 2025-02-10 DIAGNOSIS — M25.562 CHRONIC PAIN OF BOTH KNEES: Primary | ICD-10-CM

## 2025-02-10 DIAGNOSIS — M25.561 CHRONIC PAIN OF BOTH KNEES: Primary | ICD-10-CM

## 2025-02-10 DIAGNOSIS — G89.29 CHRONIC PAIN OF BOTH KNEES: Primary | ICD-10-CM

## 2025-02-10 PROCEDURE — 1159F MED LIST DOCD IN RCRD: CPT | Performed by: INTERNAL MEDICINE

## 2025-02-10 PROCEDURE — 1125F AMNT PAIN NOTED PAIN PRSNT: CPT | Performed by: INTERNAL MEDICINE

## 2025-02-10 PROCEDURE — 1160F RVW MEDS BY RX/DR IN RCRD: CPT | Performed by: INTERNAL MEDICINE

## 2025-02-10 PROCEDURE — 99214 OFFICE O/P EST MOD 30 MIN: CPT | Performed by: INTERNAL MEDICINE

## 2025-02-10 PROCEDURE — 3078F DIAST BP <80 MM HG: CPT | Performed by: INTERNAL MEDICINE

## 2025-02-10 PROCEDURE — 3074F SYST BP LT 130 MM HG: CPT | Performed by: INTERNAL MEDICINE

## 2025-02-10 NOTE — PROGRESS NOTES
02/10/2025    Summarization of Visit   Patient presents for follow-up of hypertension and new onset right knee pain.  With his last visit the patient had not taken his blood pressure medicine and his blood pressure was elevated at 146/90 today is 126/84 in the left arm sitting position standard cuff after taking his amlodipine 5 mg this morning.  He also relates he has new right knee pain which has been present for the past several weeks but increasing in severity.  He denies any swelling of the joint but gives a history of having had surgery done by Dr. Cheikh Perrin several years ago secondary to a torn meniscus.  He relates he is able to ambulate without difficulty but he has much discomfort.  He characterizes it as a 4/10 in intensity.      Assessment & Plan  1. Right knee pain.  His renal function is within normal limits, with a GFR of 90. The pain in his right knee is rated as 4 out of 10. A prescription for Naprosyn 375 mg, to be taken as one tablet twice daily, has been provided. He is advised to take the medication only if the pain intensifies. A referral to Dr. Quiroz has been made for further evaluation and potential treatment options, including the consideration of a brace or other interventions. The prescription will be sent to [facility] on [facility].    2. Hypertension.  His blood pressure is well-controlled on the current medication regimen. He is advised to continue his current blood pressure medication.    Follow-up  The patient will follow up in 3 months.    Results  Laboratory Studies  GFR is 90.    Plan:  1.)  Naprosyn 375 mg 1 tab p.o. twice daily with food for right knee pain  2.)  Referral to Dr. Cheikh Perrin orthopedic surgeon for further evaluation of the patient's right knee pain especially in view of previous surgery of meniscus tear.  3.)  Follow-up in the next several months for reevaluation  4.)  We urged the patient take his amlodipine 5 mg p.o. every morning as per  instructions.           CC: Hypertension (F/U) and Knee Pain (Right knee pain.  Ongoing issue, but getting worse.---no other issues)  .        HPI  Hypertension  Pertinent negatives include no chest pain, palpitations or shortness of breath.   Knee Pain      History of Present Illness  The patient presents for evaluation of right knee pain and hypertension.    He has been experiencing persistent pain in his right knee, which he attributes to a previously diagnosed torn meniscus. The onset of this pain was during his employment period. He reports an audible creaking sound from the knee, accompanied by discomfort during ambulation. He quantifies the pain as a 5 on a scale of 1 to 10. Despite the pain, he has not observed any associated swelling in the knee. He has been utilizing a brace for support, which he procured independently and was not prescribed by a healthcare professional.    He has taken his blood pressure medication today.    Supplemental Information  He has not been seen by the ENT doctor since his last visit. He called them for an appointment and could not get in until 03/19/2025. He was previously seen at [place] for his allergies and at [facility] for his nasal injury.    Abel Aguilar is a 74 y.o. male.      The following portions of the patient's history were reviewed and updated as appropriate: allergies, current medications, past family history, past medical history, past social history, past surgical history, and problem list.    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, left   • 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, right   • Essential hypertension, benign   • Disease of tongue   • Gastroesophageal reflux disease   • Right shoulder pain   • Tremor   • Abnormal  electrocardiogram   • Acute chest pain   • Benign essential tremor   • Carpal tunnel syndrome, bilateral   • Hypertension   • Inguinal hernia   • Memory loss   • PVC (premature ventricular contraction)   • Screening for colon cancer       Past Medical History  Past Medical History:   Diagnosis Date   • Benign positional vertigo 2019   • Cancer    • COPD (chronic obstructive pulmonary disease) 2018   • Disease of tongue 2020   • Essential hypertension, benign 2012   • Gastroesophageal reflux disease 2020   • Major depression, single episode 2019   • 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, right 2017   • 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 No pertinent family history.    Social History  Social History    Tobacco Use      Smoking status: Former        Packs/day: 0.00        Years: 0.5 packs/day for 5.0 years (2.5 ttl pk-yrs)        Types: Cigarettes        Start date:         Quit date:         Years since quittin.1        Passive exposure: Past      Smokeless tobacco: Never       Is the Patient a current tobacco user? No    Allergies  No Known Allergies    Current Medications    Current Outpatient Medications:   •  amLODIPine (NORVASC) 5 MG tablet, Take 1 tablet by mouth Daily., Disp: , Rfl:   •  Azelastine HCl 137 MCG/SPRAY solution, SPRAY 1 TO 2 SPRAYS INTO EACH NOSTRIL TWICE A DAY AS NEEDED, Disp: , Rfl:   •  fexofenadine (ALLEGRA)  180 MG tablet, Take 1 tablet by mouth Daily., Disp: , Rfl:   •  fluticasone (FLONASE) 50 MCG/ACT nasal spray, 2 sprays into the nostril(s) as directed by provider Daily., Disp: 9.9 mL, Rfl: 1  •  Multiple Vitamins-Minerals (CENTRUM SILVER ADULT 50+ PO), Take  by mouth Daily., Disp: , Rfl:   •  Omega-3 Fatty Acids (FISH OIL) 1200 MG capsule capsule, Take  by mouth., Disp: , Rfl:   •  propranolol (INDERAL) 10 MG tablet, Take 1 tablet by mouth Daily., Disp: 90 tablet, Rfl: 1  •  pyridoxine (VITAMIN B-6) 100 MG tablet tablet, Take  by mouth Daily., Disp: , Rfl:   •  vitamin C (ASCORBIC ACID) 500 MG tablet, Take 1 tablet by mouth Daily., Disp: , Rfl:   •  Zinc 50 MG tablet, Take  by mouth., Disp: , Rfl:   •  naproxen (NAPROSYN) 375 MG tablet, 1 bid with food, Disp: 40 tablet, Rfl: 1     Review of System  Review of Systems   Constitutional:  Negative for chills and fever.   Respiratory:  Negative for cough and shortness of breath.    Cardiovascular:  Negative for chest pain and palpitations.   Gastrointestinal:  Negative for constipation, diarrhea, nausea and vomiting.   Neurological:  Negative for dizziness and headache.   I have reviewed and confirmed the accuracy of the ROS as documented by the MA/LPN/RN Tray Padron MD    Vitals:    02/10/25 1200   BP: 110/70     Body mass index is 28.06 kg/m².    Objective     Physical Exam  Physical Exam  Constitutional:       General: He is not in acute distress.     Appearance: Normal appearance.   HENT:      Head: Normocephalic and atraumatic.   Cardiovascular:      Rate and Rhythm: Normal rate and regular rhythm.   Pulmonary:      Effort: Pulmonary effort is normal. No respiratory distress.      Breath sounds: Normal breath sounds. No wheezing, rhonchi or rales.   Musculoskeletal:      Comments: Drawer sign negative.  No effusion appreciated in the right knee.  Range of motion for the medial and lateral collateral ligaments normal.   Neurological:      General: No focal  deficit present.      Mental Status: He is alert and oriented to person, place, and time.   Psychiatric:         Mood and Affect: Mood normal.         Behavior: Behavior normal.         Thought Content: Thought content normal.         Judgment: Judgment normal.       Patient or patient representative verbalized consent for the use of Ambient Listening during the visit with  Tray Padron MD for chart documentation. 2/10/2025  12:53 EST    Tray Padron MD  02/10/2025

## 2025-03-06 ENCOUNTER — TELEPHONE (OUTPATIENT)
Dept: FAMILY MEDICINE CLINIC | Facility: CLINIC | Age: 75
End: 2025-03-06
Payer: MEDICARE

## 2025-03-06 NOTE — TELEPHONE ENCOUNTER
Hub to relay    Called pt to see if they would still like the referral placed by there provider for orthopedic surgery

## 2025-03-24 ENCOUNTER — TELEPHONE (OUTPATIENT)
Dept: NEUROLOGY | Facility: CLINIC | Age: 75
End: 2025-03-24

## 2025-03-24 NOTE — TELEPHONE ENCOUNTER
Provider: STEFFANIE MACHUCA    Caller: Saleem Aguilar    Relationship to Patient: Self    Phone Number: [phone]    Reason for Call: PT HAS AN APPT FOR NEUROPSYCH EVAL FOR 5/1/25.  HE HAS A 3 MONTH FU SCHEDULED FOR 4/24/25.  SHOULD HE R/S HIS APPT FOR AFTER THE NEUROPSYCH EVAL?    PLEASE CALL PT TO ADVISE     THANK YOU

## 2025-03-26 ENCOUNTER — TELEPHONE (OUTPATIENT)
Dept: NEUROLOGY | Facility: CLINIC | Age: 75
End: 2025-03-26
Payer: MEDICARE

## 2025-03-26 NOTE — TELEPHONE ENCOUNTER
Left  for pt. Please call [phone] to schedule follow up appt after neuropsyche testing is completed.

## 2025-04-04 ENCOUNTER — TELEPHONE (OUTPATIENT)
Dept: FAMILY MEDICINE CLINIC | Facility: CLINIC | Age: 75
End: 2025-04-04
Payer: MEDICARE

## 2025-04-23 RX ORDER — FEXOFENADINE HCL 180 MG/1
180 TABLET ORAL DAILY
Qty: 30 TABLET | Refills: 2 | Status: SHIPPED | OUTPATIENT
Start: 2025-04-23

## 2025-04-28 RX ORDER — NAPROXEN 375 MG/1
375 TABLET ORAL 2 TIMES DAILY WITH MEALS
Qty: 40 TABLET | Refills: 1 | Status: SHIPPED | OUTPATIENT
Start: 2025-04-28

## 2025-05-01 ENCOUNTER — TELEPHONE (OUTPATIENT)
Dept: NEUROLOGY | Facility: CLINIC | Age: 75
End: 2025-05-01

## 2025-05-01 NOTE — TELEPHONE ENCOUNTER
Caller: Saleem Aguilar    Relationship: Self    Best call back number: [phone]    What was the call regarding: PT CALLED TO ADVISE THAT HE HAS BEEN SEEN MULTIPLE TIMES BY [facility]- HE COMPLETED HIS NEUROPSYCH EVALUATION ON 4/4/25 W/ DR. MERISSA AREVALO AND HAS A FOLLOW UP WITH HIM ON 6/2/25 TO GO OVER HIS RESULTS.    PT HAD BEEN REFERRED TO [facility] NEUROPSYCH OFFICE BY DR. MACHUCA AND HAD BEEN SCHEDULED FOR NEUROPSYCH EVAL FOR TODAY, 5/1/25. HE WENT THERE FOR THE APPT AND WHEN HE ARRIVED REALIZED HE HAD ALREADY COMPLETED THE TEST W/ [facility]. PT DID NOT REPEAT THE TEST. PT IS UNSURE OF HOW HE GOT REFERRED TO [facility]UR[facility] AND WILL CALL THEM TO FIND OUT AND ASK THEY SEND US HIS EVAL RESULTS.    Do you require a callback: IF NEEDED.    PLEASE REVIEW AND ADVISE.

## 2025-05-02 NOTE — TELEPHONE ENCOUNTER
Caller: [facility]      Best call back number: [phone]     Who are you requesting to speak with (clinical staff, provider,  specific staff member): DANA    Do you know the name of the person who called: DANA    What was the call regarding: EDDIE CALLED WITH AN UPDATE ON THE REPORT - THE RECPORT IS NOT READY AND COMPLETED TESTING ON 4/4/25. THEY STATED IT CAN BE 6 WEEKS FOR THE REPORTS TO BE COMPLETED. THAT IT WILL BE APPROX 2 MORE WEEKS UNTIL IT IS FINISHED.     PLEASE REVIEW THANK YOU

## 2025-05-12 ENCOUNTER — OFFICE VISIT (OUTPATIENT)
Dept: FAMILY MEDICINE CLINIC | Facility: CLINIC | Age: 75
End: 2025-05-12
Payer: MEDICARE

## 2025-05-12 ENCOUNTER — TELEPHONE (OUTPATIENT)
Dept: FAMILY MEDICINE CLINIC | Facility: CLINIC | Age: 75
End: 2025-05-12

## 2025-05-12 VITALS
WEIGHT: 184 LBS | DIASTOLIC BLOOD PRESSURE: 78 MMHG | SYSTOLIC BLOOD PRESSURE: 120 MMHG | OXYGEN SATURATION: 98 % | BODY MASS INDEX: 27.25 KG/M2 | HEART RATE: 67 BPM | HEIGHT: 69 IN

## 2025-05-12 DIAGNOSIS — I10 PRIMARY HYPERTENSION: Primary | ICD-10-CM

## 2025-05-12 DIAGNOSIS — H81.10 BENIGN PAROXYSMAL POSITIONAL VERTIGO, UNSPECIFIED LATERALITY: ICD-10-CM

## 2025-05-12 PROCEDURE — 99214 OFFICE O/P EST MOD 30 MIN: CPT | Performed by: INTERNAL MEDICINE

## 2025-05-12 PROCEDURE — 3078F DIAST BP <80 MM HG: CPT | Performed by: INTERNAL MEDICINE

## 2025-05-12 PROCEDURE — 3074F SYST BP LT 130 MM HG: CPT | Performed by: INTERNAL MEDICINE

## 2025-05-12 PROCEDURE — G2211 COMPLEX E/M VISIT ADD ON: HCPCS | Performed by: INTERNAL MEDICINE

## 2025-05-12 PROCEDURE — 1159F MED LIST DOCD IN RCRD: CPT | Performed by: INTERNAL MEDICINE

## 2025-05-12 PROCEDURE — 1125F AMNT PAIN NOTED PAIN PRSNT: CPT | Performed by: INTERNAL MEDICINE

## 2025-05-12 PROCEDURE — 1160F RVW MEDS BY RX/DR IN RCRD: CPT | Performed by: INTERNAL MEDICINE

## 2025-05-12 RX ORDER — DOCUSATE SODIUM 100 MG/1
100 CAPSULE, LIQUID FILLED ORAL 2 TIMES DAILY
Qty: 30 CAPSULE | Refills: 3 | Status: SHIPPED | OUTPATIENT
Start: 2025-05-12

## 2025-05-12 RX ORDER — LANOLIN ALCOHOL/MO/W.PET/CERES
1000 CREAM (GRAM) TOPICAL DAILY
COMMUNITY

## 2025-05-12 RX ORDER — AMLODIPINE BESYLATE 2.5 MG/1
2.5 TABLET ORAL DAILY
Qty: 30 TABLET | Refills: 3 | Status: SHIPPED | OUTPATIENT
Start: 2025-05-12

## 2025-05-12 NOTE — TELEPHONE ENCOUNTER
Caller: [id number] [place] [address] - [phone] [facility] [phone] FX    Relationship: Pharmacy    Best call back number: [phone]     Which medication are you concerned about: amLODIPine (NORVASC) 2.5 MG tablet     Who prescribed you this medication: DR. WREN      What are your concerns: CHECKING ON MG'S OF MEDICATION.  PLEASE CALL TO CONFIRM SINCE HE WAS ON 5 MG BEFORE.

## 2025-05-12 NOTE — PROGRESS NOTES
05/12/2025    My Summary of Visit   This 74-year-old patient presents today for follow-up of hypertension.  He relates that in the interim of visits he has felt some tingling over his suture site on his scalp.  He also relates that he has felt dizzy and woozy especially in the morning as he rises from a seated to standing position.  Note is made that he is lost 6 pounds from his last visit.  His blood pressure initially was 120/78 but on recheck by me in the left arm sitting position standard cuff was 112/70.  He is currently on amlodipine 5 mg p.o. every morning.    He also complains of constipation for the past 3 to 4 weeks.  He denies any blood per stool and denies dark-colored stool.  He has had no abdominal pain or discomfort.    I think that his vertigo etc. is caused by hypotension secondary to his weight loss.    Review of his labs shows that his comprehensive metabolic profile from 1/13/2025 was essentially within normal limits with exception of a slightly elevated glucose at 122.  In particular his GFR was excellent at 90.5.    His CBC showed a normal hemoglobin at 13.9 with hematocrit of 41.8.      Assessment & Plan  1. Dizziness.  - Reports feeling dizzy, which may be due to hypotension.  - Blood pressure reading is low; patient has lost 6 pounds.  - Discussed reducing amlodipine dosage from 5 mg to 2.5 mg.  - New prescription for amlodipine 2.5 mg will be provided.    2. Constipation.  - Experiencing constipation for a few weeks.  - No recent dietary changes; stool is not dark or black.  - Colace prescribed to soften stools; advised to take one tablet daily.  - Counseling provided on the use of stool softeners versus laxatives.    3. Suture site pressure.  - Reports pressure sensation at the suture site, ongoing since last visit on 02/10/2025.  - Physical examination indicates normal healing process.  - Advised to allow more time for healing and to monitor the site.    Follow-up  - Follow-up appointment  scheduled in 3 weeks to reassess dizziness and blood pressure management.    Results         Plan:  1.)  Decrease amlodipine to 2.5 mg 1 tab p.o. every morning  2.)  Colace 100 mg 1 tab p.o. daily  3.)  Follow-up in 2 to 3 weeks for reevaluation of hypertension         CC: Hypertension (F/U.  Occasionally feels pressure where he previously had stitches on his forehead.  Issues with dizziness and balance issues.) and Constipation (Could this be due to one of his medications?)  .        HPI  Hypertension  Associated symptoms: no chest pain, no palpitations, no shortness of breath and no dizziness    Constipation  Pertinent negatives include no diarrhea, fever, nausea or vomiting.    History of Present Illness  The patient presents for evaluation of dizziness, constipation, and suture site pressure.    He reports experiencing intermittent episodes of dizziness, despite adherence to his prescribed antihypertensive medication regimen, which includes amlodipine 5 mg. He has not made any recent dietary modifications, including the reduction of soda or bread intake. Additionally, he has lost 6 pounds recently without following any special diet.    He has been dealing with constipation for several weeks and has attempted to manage it with an over-the-counter medication with a purple label, but has not observed any dark or black stools. There have been no recent changes to his diet.    He also reports a sensation of pressure at a previous suture site, which is not associated with pain but has been persistent since his last visit approximately 3 to 4 months ago.    He is also on propranolol for his tremor.    Abel Aguilar is a 74 y.o. male.      The following portions of the patient's history were reviewed and updated as appropriate: allergies, current medications, past family history, past medical history, past social history, past surgical history, and problem list.    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, left    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, right    Essential hypertension, benign    Disease of tongue    Gastroesophageal reflux disease    Right shoulder pain    Tremor    Abnormal electrocardiogram    Acute chest pain    Benign essential tremor    Carpal tunnel syndrome, bilateral    Hypertension    Inguinal hernia    Memory loss    PVC (premature ventricular contraction)    Screening for colon cancer       Past Medical History  Past Medical History:   Diagnosis Date    Benign positional vertigo 2019    Cancer     COPD (chronic obstructive pulmonary disease) 2018    Disease of tongue 2020    Essential hypertension, benign 2012    Gastroesophageal reflux disease 2020    Major depression, single episode 2019    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, right 2017    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 No pertinent family history.    Social History  Social History    Tobacco Use      Smoking status: Former        Packs/day: 0.00        Years: 0.5 packs/day for 5.0 years (2.5 ttl pk-yrs)        Types: Cigarettes        Start date:         Quit date:         Years since quittin.3        Passive exposure: Past      Smokeless  tobacco: Never       Is the Patient a current tobacco user? No    Allergies  No Known Allergies    Current Medications    Current Outpatient Medications:     amLODIPine (NORVASC) 5 MG tablet, Take 1 tablet by mouth Daily., Disp: , Rfl:     Azelastine HCl 137 MCG/SPRAY solution, SPRAY 1 TO 2 SPRAYS INTO EACH NOSTRIL TWICE A DAY AS NEEDED, Disp: , Rfl:     B Complex Vitamins (VITAMIN B COMPLEX PO), Take  by mouth., Disp: , Rfl:     Biotin 5000 MCG capsule, Take  by mouth., Disp: , Rfl:     fexofenadine (ALLEGRA) 180 MG tablet, TAKE 1 TABLET BY MOUTH EVERY DAY, Disp: 30 tablet, Rfl: 2    fluticasone (FLONASE) 50 MCG/ACT nasal spray, 2 sprays into the nostril(s) as directed by provider Daily., Disp: 9.9 mL, Rfl: 1    Multiple Vitamins-Minerals (CENTRUM SILVER ADULT 50+ PO), Take  by mouth Daily., Disp: , Rfl:     naproxen (NAPROSYN) 375 MG tablet, TAKE 1 TABLET BY MOUTH TWICE A DAY WITH FOOD, Disp: 40 tablet, Rfl: 1    Omega-3 Fatty Acids (FISH OIL) 1200 MG capsule capsule, Take  by mouth., Disp: , Rfl:     propranolol (INDERAL) 10 MG tablet, Take 1 tablet by mouth Daily., Disp: 90 tablet, Rfl: 1    vitamin B-12 (CYANOCOBALAMIN) 1000 MCG tablet, Take 1 tablet by mouth Daily., Disp: , Rfl:     vitamin C (ASCORBIC ACID) 500 MG tablet, Take 1 tablet by mouth Daily., Disp: , Rfl:     Zinc 50 MG tablet, Take  by mouth., Disp: , Rfl:      Review of System  Review of Systems   Constitutional:  Negative for chills and fever.   Respiratory:  Negative for cough and shortness of breath.    Cardiovascular:  Negative for chest pain and palpitations.   Gastrointestinal:  Positive for constipation. Negative for diarrhea, nausea and vomiting.   Neurological:  Negative for dizziness and headache.   I have reviewed and confirmed the accuracy of the ROS as documented by the MA/LPN/RN Tray Padron MD    Vitals:    05/12/25 1056   BP: 120/78   Pulse: 67   SpO2: 98%     Body mass index is 27.17 kg/m².    Objective     Physical  Exam  Physical Exam  Constitutional:       General: He is not in acute distress.     Appearance: Normal appearance.   HENT:      Head: Normocephalic and atraumatic.   Cardiovascular:      Rate and Rhythm: Normal rate and regular rhythm.   Pulmonary:      Effort: Pulmonary effort is normal. No respiratory distress.      Breath sounds: Normal breath sounds. No wheezing, rhonchi or rales.   Neurological:      General: No focal deficit present.      Mental Status: He is alert and oriented to person, place, and time.   Psychiatric:         Mood and Affect: Mood normal.         Behavior: Behavior normal.         Thought Content: Thought content normal.         Judgment: Judgment normal.       Patient or patient representative verbalized consent for the use of Ambient Listening during the visit with  Tray Padron MD for chart documentation. 5/12/2025  11:47 EDT    Tray Padron MD  05/12/2025

## 2025-06-05 ENCOUNTER — OFFICE VISIT (OUTPATIENT)
Dept: FAMILY MEDICINE CLINIC | Facility: CLINIC | Age: 75
End: 2025-06-05
Payer: MEDICARE

## 2025-06-05 VITALS
BODY MASS INDEX: 26.51 KG/M2 | OXYGEN SATURATION: 98 % | HEIGHT: 69 IN | HEART RATE: 56 BPM | SYSTOLIC BLOOD PRESSURE: 130 MMHG | WEIGHT: 179 LBS | DIASTOLIC BLOOD PRESSURE: 78 MMHG

## 2025-06-05 DIAGNOSIS — I10 PRIMARY HYPERTENSION: Primary | ICD-10-CM

## 2025-06-05 PROCEDURE — 3075F SYST BP GE 130 - 139MM HG: CPT | Performed by: INTERNAL MEDICINE

## 2025-06-05 PROCEDURE — 99213 OFFICE O/P EST LOW 20 MIN: CPT | Performed by: INTERNAL MEDICINE

## 2025-06-05 PROCEDURE — 1160F RVW MEDS BY RX/DR IN RCRD: CPT | Performed by: INTERNAL MEDICINE

## 2025-06-05 PROCEDURE — 3078F DIAST BP <80 MM HG: CPT | Performed by: INTERNAL MEDICINE

## 2025-06-05 PROCEDURE — 1125F AMNT PAIN NOTED PAIN PRSNT: CPT | Performed by: INTERNAL MEDICINE

## 2025-06-05 PROCEDURE — 1159F MED LIST DOCD IN RCRD: CPT | Performed by: INTERNAL MEDICINE

## 2025-06-05 NOTE — PROGRESS NOTES
06/05/2025    Assessment & Plan   This 75-year-old patient presents at this time for follow-up of hypertension.  With his last visit we decreased his amlodipine to 2.5 mg after complaints of weakness and tiredness.  His blood pressure was subtherapeutic also at 110/70.  He relates that he did not  his medication and he continued on the amlodipine 5 mg.  On recheck by me in his left arm sitting position standard cuff his blood pressure was 116/70 in the left arm sitting position but note is made he has not had any of his medications this morning.  He relates that the dizziness and tiredness has continued as we would expect as he has not made any changes with his medication.        Diagnoses and all orders for this visit:    1. Primary hypertension (Primary)         Plan:  1.)  We urged the patient to  his amlodipine 2.5 mg and take it as directed.  He is to stop taking the 5.0.  Will see him back for follow-up in the next few weeks.         CC: Hypertension (F/U.  Has still been taking Amlodipine 5mg instead of 2.5mg.)  .        HPI  History of Present Illness     Subjective   Saleem Aguliar is a 75 y.o. male.      The following portions of the patient's history were reviewed and updated as appropriate: allergies, current medications, past family history, past medical history, past social history, past surgical history, and problem list.    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, left   • 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, right   • Essential hypertension, benign   • Disease of tongue   • Gastroesophageal reflux disease   • Right shoulder pain   • Tremor   • Abnormal electrocardiogram   • Acute chest pain   • Benign essential tremor   • Carpal tunnel syndrome, bilateral   •  Hypertension   • Inguinal hernia   • Memory loss   • PVC (premature ventricular contraction)   • Screening for colon cancer       Past Medical History  Past Medical History:   Diagnosis Date   • Benign positional vertigo 2019   • Cancer    • COPD (chronic obstructive pulmonary disease) 2018   • Disease of tongue 2020   • Essential hypertension, benign 2012   • Gastroesophageal reflux disease 2020   • Major depression, single episode 2019   • 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, right 2017   • 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 ENDOSCOPY;  Service: General;  Laterality: N/A;  SCREENING  post-- normal   • HERNIA REPAIR     • MENISCECTOMY Right    • PROSTATECTOMY         Family History  History reviewed. No pertinent family history.    Social History  Social History    Tobacco Use      Smoking status: Former        Packs/day: 0.00        Years: 0.5 packs/day for 5.0 years (2.5 ttl pk-yrs)        Types: Cigarettes        Start date:         Quit date:         Years since quittin.4        Passive exposure: Past      Smokeless tobacco: Never       Is the Patient a current tobacco user? No    Allergies  No Known Allergies    Current Medications    Current Outpatient Medications:   •  amLODIPine (NORVASC) 2.5 MG tablet, Take 1 tablet by mouth Daily., Disp: 30 tablet, Rfl: 3  •  Azelastine HCl 137 MCG/SPRAY solution, SPRAY 1 TO 2 SPRAYS INTO EACH NOSTRIL TWICE A DAY AS NEEDED, Disp: , Rfl:   •  B Complex Vitamins (VITAMIN B COMPLEX PO), Take  by mouth., Disp: , Rfl:   •  Biotin 5000 MCG capsule, Take  by mouth.,  Disp: , Rfl:   •  docusate sodium (Colace) 100 MG capsule, Take 1 capsule by mouth 2 (Two) Times a Day., Disp: 30 capsule, Rfl: 3  •  fexofenadine (ALLEGRA) 180 MG tablet, TAKE 1 TABLET BY MOUTH EVERY DAY, Disp: 30 tablet, Rfl: 2  •  fluticasone (FLONASE) 50 MCG/ACT nasal spray, 2 sprays into the nostril(s) as directed by provider Daily., Disp: 9.9 mL, Rfl: 1  •  Multiple Vitamins-Minerals (CENTRUM SILVER ADULT 50+ PO), Take  by mouth Daily., Disp: , Rfl:   •  naproxen (NAPROSYN) 375 MG tablet, TAKE 1 TABLET BY MOUTH TWICE A DAY WITH FOOD, Disp: 40 tablet, Rfl: 1  •  Omega-3 Fatty Acids (FISH OIL) 1200 MG capsule capsule, Take  by mouth., Disp: , Rfl:   •  propranolol (INDERAL) 10 MG tablet, Take 1 tablet by mouth Daily., Disp: 90 tablet, Rfl: 1  •  vitamin B-12 (CYANOCOBALAMIN) 1000 MCG tablet, Take 1 tablet by mouth Daily., Disp: , Rfl:   •  vitamin C (ASCORBIC ACID) 500 MG tablet, Take 1 tablet by mouth Daily., Disp: , Rfl:   •  Zinc 50 MG tablet, Take  by mouth., Disp: , Rfl:      Review of System  Review of Systems   Constitutional:  Negative for chills and fever.   Respiratory:  Negative for cough and shortness of breath.    Cardiovascular:  Negative for chest pain and palpitations.   Gastrointestinal:  Negative for constipation, diarrhea, nausea and vomiting.   Neurological:  Negative for dizziness and headache.   I have reviewed and confirmed the accuracy of the ROS as documented by the MA/LPN/RN Tray Padron MD    Vitals:    06/05/25 0958   BP: 130/78   Pulse: 56   SpO2: 98%     Body mass index is 26.43 kg/m².    Objective     Physical Exam  Physical Exam  Constitutional:       General: He is not in acute distress.     Appearance: Normal appearance.   HENT:      Head: Normocephalic and atraumatic.   Cardiovascular:      Rate and Rhythm: Normal rate and regular rhythm.   Pulmonary:      Effort: Pulmonary effort is normal. No respiratory distress.      Breath sounds: Normal breath sounds. No wheezing,  rhonchi or rales.   Neurological:      General: No focal deficit present.      Mental Status: He is alert and oriented to person, place, and time.   Psychiatric:         Mood and Affect: Mood normal.         Behavior: Behavior normal.         Thought Content: Thought content normal.         Judgment: Judgment normal.         Tray Padron MD  06/05/2025

## 2025-06-16 RX ORDER — NAPROXEN 375 MG/1
375 TABLET ORAL 2 TIMES DAILY WITH MEALS
Qty: 40 TABLET | Refills: 1 | Status: SHIPPED | OUTPATIENT
Start: 2025-06-16

## 2025-06-25 ENCOUNTER — OFFICE VISIT (OUTPATIENT)
Dept: NEUROLOGY | Facility: CLINIC | Age: 75
End: 2025-06-25
Payer: MEDICARE

## 2025-06-25 VITALS
SYSTOLIC BLOOD PRESSURE: 110 MMHG | WEIGHT: 178.6 LBS | BODY MASS INDEX: 26.45 KG/M2 | HEIGHT: 69 IN | OXYGEN SATURATION: 97 % | HEART RATE: 74 BPM | DIASTOLIC BLOOD PRESSURE: 70 MMHG

## 2025-06-25 DIAGNOSIS — G25.0 ESSENTIAL TREMOR: Primary | ICD-10-CM

## 2025-06-25 DIAGNOSIS — G31.84 MILD COGNITIVE IMPAIRMENT: ICD-10-CM

## 2025-06-25 NOTE — PROGRESS NOTES
[facility] Neurology   Patient ID: Saleem Aguilar  Age: 75 y.o.   Chief compliant:   Chief Complaint   Patient presents with    Tremors     F/u       Portions of this assessment have been copied from previous documentation which has been thoroughly reviewed and updated as appropriate.    Assessment/Plan:    Saleem Aguilar is a 75 y.o. male presenting with multiple complaints and multiple separate, unrelated neurological diagnoses.     MCI  Patient presents with forgetfulness largely consistent with amnestic MCI with no major limitation in his function, and similar symptoms described dating back to 2016.  MoCA score of 21/30 seem out of proportion to his symptoms with MMSE score of 25/30.  Patient has completed neuropsychological testing; however, report remains unavailable for review. Patient wishes to avoid medications if possible for any treatment and will defer at this time until further evaluation.    Essential Tremor  Patient's action greater than postural tremor appears consistent with essential tremor.  He has previously tolerated treatment with propranolol at a very low dose of 10 mg daily and was started back on this, but appears he had low blood pressures, excessive fatigue and weight loss which may be attributed to this medication.  Will discontinue at this time and reassess off medication.  Additional medication options such as gabapentin, primidone, topiramate will have to be weighed for their potential side effects of sedation and cognitive dysfunction.    Neuropathy (not discussed 6/25/2025)  He has prior EMG evidence of bilateral ulnar neuropathy also in 2016, and continues to report numbness particular of his fourth and fifth digit which fits within this range.  No additional signs consistent with carpal tunnel syndrome.  His EMG notes bilateral median and bilateral ulnar neuropathy which raises question of a peripheral neuropathy.  He has no findings consistent with a length-dependent peripheral  neuropathy in the lower extremities.     Diagnoses and all orders for this visit:    1. Essential tremor (Primary)    2. Mild cognitive impairment         Plan:  Record request sent to Dr. Barksdale.  Will plan to call patient with results and next steps.  Monitor off propranolol in regards to tremor and energy levels.    Mike Felipe MD        Subjective     Interval history (6/25/2025):   He has newly been experiencing fatigue. He has been losing weight. He has low appetite. He may skip lunch. He tries to drink protein drinks. Energy levels are low. Due to relatively low blood pressures, Dr. Padron reduced amlodipine to 2.5 mg  Dr. Shalom Barksdale. Evaluated 4/4/25 and reviewed results 6/2/2025. Records have not been faxed.  He feels is tremor is less prominent. Handwriting is still shaky.    Interval history (1/9/2025):   Reviewed EMG. He reports it has slightly improved. He has some trouble opening jars. It is not constant. Numbness is more noticed on waking if he slept on it wrong or can occur randomly during the day. It doesn't hurt. His ganglion cyst has gone away. He did not see the hand surgeon.  He saw Dr. Huddleston for neurocognitive testing. It sounds like he has had an initial appointment, but formal testing is upcoming in the next month.  He is taking propranolol 5 mg.  Appears off other medications for blood pressure currently, although he is personally unclear what blood pressure medications he takes.  Appears he saw Dr. Padron and there was concern for low blood pressures, so his dose was reduced from 10 mg to 5 mg.   He feels like he is not having any issues with either dose.  He rolled out of bed and hit his head on the nightstand since has seen an ENT     Interval history (10/21/2024):   Patient denies any new or progressive symptoms regarding his tremor, dizziness, or memory.  Reviewed his EMG appointment which is pending on 11/11/2024.  Wife reports neuropsychological testing is scheduled on 11/16/2024.   "Reviewed documentation from Dr. Padron, PCP, who established a plan to wean off amlodipine then begin propranolol.  Patient has this plan written down on a paper in his pocket, but then questions why he is taking amlodipine still.  Appears propranolol was not filled at his pharmacy.  Reviewed new plan to start propranolol after 1 week fitting with recommendations by Dr. Padron.        Initial HPI (9/16/2024):   \"Patient has been referred from his PCP regarding multiple issues as listed below.     Tremor - He has issues with \"quakes\" that he notices when he is active. Rarely in the left hand, but moreso in the right hand which is his dominant hand. This has been ongoing for a while, but worse over the last year. Not occurring at rest. Occurs with writing, using tools. No significant issues with eating. He was previously on propranolol (10 mg?). Per chart review, he was previously prescribed primidone He denies any issues and is unsure why this was stopped. No particular triggers.     Dizziness - Going on for the last year. He gets some lightheadedness when he's active. He has to stop and drink water. Has occurred when he is pushing a lawnmower or a grocery cart in the store. It is lightheaded as though he might pass out and feels winded. He has had this happen when he stands up too fast. It only lasts a few moments to minutes. He drinks a lot of water throughout the days.  He has occasional missteps, but no other falls. It is affecting his activity as he is less active. It occurs most days.  He rolled out of bed in the middle of the night. Then upon getting up and walking, he lost his balance and hit his head. He denies any vivid dreams. He is not acting out his dreams, but has been talking loudly in his sleep.      Memory - he occasionally won't remember the name of individuals. He will get lost in conversation. He needs to write down appointments. This has been moreso in the past few years. Per Dr. 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 He is driving without issue. Continues to manage the finances. He had a MoCA score of 21 on 8/2/2024 per PCP note.      He reports numbness on the outside of both hands and 4th and 5th digits. Not particularly worse in the morning. It seems to be random.  He reports being told he has carpal tunnel after an EMG when seeing his previous neurologist (2016).      Family history - no neurological history \"    Objective     Vitals:    06/25/25 1128   BP: 110/70   Pulse: 74   SpO2: 97%       The following portions of the patient's history were reviewed and updated as appropriate: allergies, current medications, past family history, past medical history, past social history, past surgical history and problem list.    Neurological Exam  Mental Status  Awake, alert and oriented to person, place and time. Speech is normal. Language is fluent with no aphasia. Attention and concentration are normal. Fund of knowledge is appropriate for level of education.    Cranial Nerves  CN II: Visual acuity is normal.  CN III, IV, VI: Extraocular movements intact bilaterally. Pupils equal round and reactive to light bilaterally.  CN V: Facial sensation is normal.  CN VII: Full and symmetric facial movement.  CN XI: Shoulder shrug strength is normal.    Motor   The following abnormal movements were seen: Action>postural tremor of L>R upper extremity. No head or neck tremor. Unsteadiness at end of spiral copying and cup pouring with the left hand. Did not spill water.   Strength is 5/5 in all four extremities except as noted.    Coordination    Appropriate hand-eye coordination.    Gait  Casual gait is normal including stance, stride, and arm swing.         I spent 35 minutes caring for this patient on this date of service. This time includes time spent by me in the following activities as " necessary: preparing for the visit, reviewing tests, medical records and previous visits, obtaining and/or reviewing a separately obtained history, performing a medically appropriate exam and/or evaluation, counseling and educating the patient, and/or communicating with other healthcare professionals, documenting information in the medical record, independently interpreting results and communicating that information with the patient, and developing a medically appropriate treatment plan with consideration of other conditions, medications, and treatments.

## 2025-06-25 NOTE — LETTER
June 25, 2025     Tray Padron MD  [address] [facility] [address]    Patient: Saleem Aguilar   YOB: 1950   Date of Visit: 6/25/2025     Dear Tray Padron MD:       Thank you for referring Saleem Aguilar to me for evaluation. Below are the relevant portions of my assessment and plan of care.    If you have questions, please do not hesitate to call me. I look forward to following Saleem along with you.         Sincerely,        Mike Felipe MD        CC: No Recipients    Mike Felipe MD  06/25/25 1626  Sign when Signing Visit  [facility]   Patient ID: Saleem Aguilar  Age: 75 y.o.   Chief compliant:   Chief Complaint   Patient presents with   • Tremors     F/u       Portions of this assessment have been copied from previous documentation which has been thoroughly reviewed and updated as appropriate.    Assessment/Plan:    Saleem Aguilar is a 75 y.o. male presenting with multiple complaints and multiple separate, unrelated neurological diagnoses.     MCI  Patient presents with forgetfulness largely consistent with amnestic MCI with no major limitation in his function, and similar symptoms described dating back to 2016.  MoCA score of 21/30 seem out of proportion to his symptoms with MMSE score of 25/30.  Patient has completed neuropsychological testing; however, report remains unavailable for review. Patient wishes to avoid medications if possible for any treatment and will defer at this time until further evaluation.    Essential Tremor  Patient's action greater than postural tremor appears consistent with essential tremor.  He has previously tolerated treatment with propranolol at a very low dose of 10 mg daily and was started back on this, but appears he had low blood pressures, excessive fatigue and weight loss which may be attributed to this medication.  Will discontinue at this time and reassess off medication.  Additional medication options such as gabapentin, primidone, topiramate  will have to be weighed for their potential side effects of sedation and cognitive dysfunction.    Neuropathy (not discussed 6/25/2025)  He has prior EMG evidence of bilateral ulnar neuropathy also in 2016, and continues to report numbness particular of his fourth and fifth digit which fits within this range.  No additional signs consistent with carpal tunnel syndrome.  His EMG notes bilateral median and bilateral ulnar neuropathy which raises question of a peripheral neuropathy.  He has no findings consistent with a length-dependent peripheral neuropathy in the lower extremities.     Diagnoses and all orders for this visit:    1. Essential tremor (Primary)    2. Mild cognitive impairment         Plan:  Record request sent to Dr. Barksdale.  Will plan to call patient with results and next steps.  Monitor off propranolol in regards to tremor and energy levels.    Mike Felipe MD        Subjective    Interval history (6/25/2025):   He has newly been experiencing fatigue. He has been losing weight. He has low appetite. He may skip lunch. He tries to drink protein drinks. Energy levels are low. Due to relatively low blood pressures, Dr. Padron reduced amlodipine to 2.5 mg  Dr. Shalom Barksdale. Evaluated 4/4/25 and reviewed results 6/2/2025. Records have not been faxed.  He feels is tremor is less prominent. Handwriting is still shaky.    Interval history (1/9/2025):   Reviewed EMG. He reports it has slightly improved. He has some trouble opening jars. It is not constant. Numbness is more noticed on waking if he slept on it wrong or can occur randomly during the day. It doesn't hurt. His ganglion cyst has gone away. He did not see the hand surgeon.  He saw Dr. Huddleston for neurocognitive testing. It sounds like he has had an initial appointment, but formal testing is upcoming in the next month.  He is taking propranolol 5 mg.  Appears off other medications for blood pressure currently, although he is personally unclear what blood  "pressure medications he takes.  Appears he saw Dr. Padron and there was concern for low blood pressures, so his dose was reduced from 10 mg to 5 mg.   He feels like he is not having any issues with either dose.  He rolled out of bed and hit his head on the nightstand since has seen an ENT     Interval history (10/21/2024):   Patient denies any new or progressive symptoms regarding his tremor, dizziness, or memory.  Reviewed his EMG appointment which is pending on 11/11/2024.  Wife reports neuropsychological testing is scheduled on 11/16/2024.  Reviewed documentation from Dr. Padron, PCP, who established a plan to wean off amlodipine then begin propranolol.  Patient has this plan written down on a paper in his pocket, but then questions why he is taking amlodipine still.  Appears propranolol was not filled at his pharmacy.  Reviewed new plan to start propranolol after 1 week fitting with recommendations by Dr. Padron.        Initial HPI (9/16/2024):   \"Patient has been referred from his PCP regarding multiple issues as listed below.     Tremor - He has issues with \"quakes\" that he notices when he is active. Rarely in the left hand, but moreso in the right hand which is his dominant hand. This has been ongoing for a while, but worse over the last year. Not occurring at rest. Occurs with writing, using tools. No significant issues with eating. He was previously on propranolol (10 mg?). Per chart review, he was previously prescribed primidone He denies any issues and is unsure why this was stopped. No particular triggers.     Dizziness - Going on for the last year. He gets some lightheadedness when he's active. He has to stop and drink water. Has occurred when he is pushing a lawnmower or a grocery cart in the store. It is lightheaded as though he might pass out and feels winded. He has had this happen when he stands up too fast. It only lasts a few moments to minutes. He drinks a lot of water throughout the days.  He " "has occasional missteps, but no other falls. It is affecting his activity as he is less active. It occurs most days.  He rolled out of bed in the middle of the night. Then upon getting up and walking, he lost his balance and hit his head. He denies any vivid dreams. He is not acting out his dreams, but has been talking loudly in his sleep.      Memory - he occasionally won't remember the name of individuals. He will get lost in conversation. He needs to write down appointments. This has been moreso in the past few years. Per Dr. 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 He is driving without issue. Continues to manage the finances. He had a MoCA score of 21 on 8/2/2024 per PCP note.      He reports numbness on the outside of both hands and 4th and 5th digits. Not particularly worse in the morning. It seems to be random.  He reports being told he has carpal tunnel after an EMG when seeing his previous neurologist (2016).      Family history - no neurological history \"    Objective    Vitals:    06/25/25 1128   BP: 110/70   Pulse: 74   SpO2: 97%       The following portions of the patient's history were reviewed and updated as appropriate: allergies, current medications, past family history, past medical history, past social history, past surgical history and problem list.    Neurological Exam  Mental Status  Awake, alert and oriented to person, place and time. Speech is normal. Language is fluent with no aphasia. Attention and concentration are normal. Fund of knowledge is appropriate for level of education.    Cranial Nerves  CN II: Visual acuity is normal.  CN III, IV, VI: Extraocular movements intact bilaterally. Pupils equal round and reactive to light bilaterally.  CN V: Facial sensation is normal.  CN VII: Full and symmetric facial movement.  CN XI: Shoulder shrug strength is " normal.    Motor   The following abnormal movements were seen: Action>postural tremor of L>R upper extremity. No head or neck tremor. Unsteadiness at end of spiral copying and cup pouring with the left hand. Did not spill water.   Strength is 5/5 in all four extremities except as noted.    Coordination    Appropriate hand-eye coordination.    Gait  Casual gait is normal including stance, stride, and arm swing.         I spent 35 minutes caring for this patient on this date of service. This time includes time spent by me in the following activities as necessary: preparing for the visit, reviewing tests, medical records and previous visits, obtaining and/or reviewing a separately obtained history, performing a medically appropriate exam and/or evaluation, counseling and educating the patient, and/or communicating with other healthcare professionals, documenting information in the medical record, independently interpreting results and communicating that information with the patient, and developing a medically appropriate treatment plan with consideration of other conditions, medications, and treatments.

## 2025-06-25 NOTE — PATIENT INSTRUCTIONS
Please stop propranolol (Inderal) as discussed.    I will call you to discuss the neuropsychological report

## 2025-07-03 ENCOUNTER — OFFICE VISIT (OUTPATIENT)
Dept: FAMILY MEDICINE CLINIC | Facility: CLINIC | Age: 75
End: 2025-07-03
Payer: MEDICARE

## 2025-07-03 VITALS
SYSTOLIC BLOOD PRESSURE: 126 MMHG | WEIGHT: 179 LBS | HEART RATE: 83 BPM | OXYGEN SATURATION: 98 % | HEIGHT: 69 IN | DIASTOLIC BLOOD PRESSURE: 90 MMHG | BODY MASS INDEX: 26.51 KG/M2

## 2025-07-03 DIAGNOSIS — K59.00 CONSTIPATION, UNSPECIFIED CONSTIPATION TYPE: ICD-10-CM

## 2025-07-03 DIAGNOSIS — I10 PRIMARY HYPERTENSION: ICD-10-CM

## 2025-07-03 DIAGNOSIS — G31.84 MILD COGNITIVE IMPAIRMENT: Primary | ICD-10-CM

## 2025-07-03 DIAGNOSIS — G25.0 BENIGN ESSENTIAL TREMOR: ICD-10-CM

## 2025-07-03 RX ORDER — AMLODIPINE BESYLATE 5 MG/1
5 TABLET ORAL DAILY
Qty: 30 TABLET | Refills: 2 | Status: SHIPPED | OUTPATIENT
Start: 2025-07-03

## 2025-07-03 NOTE — PROGRESS NOTES
07/03/2025    Assessment & Plan   Diagnoses and all orders for this visit:    1. Mild cognitive impairment (Primary)    2. Constipation, unspecified constipation type    3. Primary hypertension    4. Benign essential tremor    Plan:  1.)  Increase amlodipine to 5 mg p.o. every morning from his current 2.5.  2.)  He is off propranolol and will continue him off that for now.  3.)  Follow-up in the next 3 weeks or so for hypertension and constipation problems.  He will follow-up with .     This 75-year-old patient presents at this time for follow-up of hypertension.  He relates that he is feeling fine having had no problems in the interim of visits.  His blood pressure was initially 126/90 on recheck by me however in the left arm sitting position standard cuff was 138/80.  He currently is on amlodipine 2.5 mg 1 tab p.o. daily.  In the interim of visits he was seen by Dr. Felipe neurologist who felt the patient had mild cognitive impairment and had previously referred the patient to   Neuropsychologist.    Completed his workup and has sent a detailed report to Dr. Felipe indicating the patient would benefit from speech therapy cognition remediation.  Patient is scheduled to see Dr. Felipe again in September.    Patient at the end of the visit related he has also had problems with constipation over the past few weeks.  He relates that he does have difficulty going sometimes only every 2 to 3 days.  We have asked him to increase his fiber intake and will also add Colace 100 mg 1 tab p.o. twice daily to his regimen.  Will see him back for follow-up in the next few weeks for this problem.         CC: Hypertension (F/U.  Has seen neurology and neuropsychology since lov.)  .        HPI  Hypertension  Associated symptoms: no chest pain, no palpitations, no shortness of breath and no dizziness         Subjective   Saleem Aguilar is a 75 y.o. male.      The following portions of the patient's history were reviewed and  updated as appropriate: allergies, current medications, past family history, past medical history, past social history, past surgical history, and problem list.    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, left    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, right    Essential hypertension, benign    Disease of tongue    Gastroesophageal reflux disease    Right shoulder pain    Tremor    Abnormal electrocardiogram    Acute chest pain    Benign essential tremor    Carpal tunnel syndrome, bilateral    Hypertension    Inguinal hernia    Memory loss    PVC (premature ventricular contraction)    Screening for colon cancer       Past Medical History  Past Medical History:   Diagnosis Date    Benign positional vertigo 06/26/2019    Cancer     COPD (chronic obstructive pulmonary disease) 07/23/2018    Disease of tongue 07/17/2020    Essential hypertension, benign 07/09/2012    Gastroesophageal reflux disease 07/17/2020    Major depression, single episode 12/19/2019    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, right 01/03/2017    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 No pertinent family history.    Social History  Social History    Tobacco Use      Smoking status: Former        Packs/day: 0.00         Years: 0.5 packs/day for 5.0 years (2.5 ttl pk-yrs)        Types: Cigarettes        Start date:         Quit date:         Years since quittin.5        Passive exposure: Past      Smokeless tobacco: Never       Is the Patient a current tobacco user? No    Allergies  No Known Allergies    Current Medications    Current Outpatient Medications:     amLODIPine (NORVASC) 2.5 MG tablet, Take 1 tablet by mouth Daily., Disp: 30 tablet, Rfl: 3    Azelastine HCl 137 MCG/SPRAY solution, SPRAY 1 TO 2 SPRAYS INTO EACH NOSTRIL TWICE A DAY AS NEEDED, Disp: , Rfl:     B Complex Vitamins (VITAMIN B COMPLEX PO), Take  by mouth., Disp: , Rfl:     Biotin 5000 MCG capsule, Take  by mouth., Disp: , Rfl:     docusate sodium (Colace) 100 MG capsule, Take 1 capsule by mouth 2 (Two) Times a Day., Disp: 30 capsule, Rfl: 3    fexofenadine (ALLEGRA) 180 MG tablet, TAKE 1 TABLET BY MOUTH EVERY DAY, Disp: 30 tablet, Rfl: 2    fluticasone (FLONASE) 50 MCG/ACT nasal spray, 2 sprays into the nostril(s) as directed by provider Daily., Disp: 9.9 mL, Rfl: 1    Multiple Vitamins-Minerals (CENTRUM SILVER ADULT 50+ PO), Take  by mouth Daily., Disp: , Rfl:     naproxen (NAPROSYN) 375 MG tablet, TAKE 1 TABLET BY MOUTH TWICE A DAY WITH FOOD, Disp: 40 tablet, Rfl: 1    Omega-3 Fatty Acids (FISH OIL) 1200 MG capsule capsule, Take  by mouth., Disp: , Rfl:     vitamin B-12 (CYANOCOBALAMIN) 1000 MCG tablet, Take 1 tablet by mouth Daily., Disp: , Rfl:     vitamin C (ASCORBIC ACID) 500 MG tablet, Take 1 tablet by mouth Daily., Disp: , Rfl:     Zinc 50 MG tablet, Take  by mouth., Disp: , Rfl:      Review of System  Review of Systems   Constitutional:  Negative for chills and fever.   Respiratory:  Negative for cough and shortness of breath.    Cardiovascular:  Negative for chest pain and palpitations.   Gastrointestinal:  Negative for constipation, diarrhea, nausea and vomiting.   Neurological:  Negative for dizziness and headache.     I have reviewed  and confirmed the accuracy of the ROS as documented by the MA/LPN/RN Tray Padron MD    Vitals:    07/03/25 0902   BP: 126/90   Pulse: 83   SpO2: 98%     Body mass index is 26.43 kg/m².    Objective     Physical Exam  Physical Exam  Constitutional:       General: He is not in acute distress.     Appearance: Normal appearance.   HENT:      Head: Normocephalic and atraumatic.   Cardiovascular:      Rate and Rhythm: Normal rate and regular rhythm.   Pulmonary:      Effort: Pulmonary effort is normal. No respiratory distress.      Breath sounds: Normal breath sounds. No wheezing, rhonchi or rales.   Neurological:      General: No focal deficit present.      Mental Status: He is alert and oriented to person, place, and time.   Psychiatric:         Mood and Affect: Mood normal.         Behavior: Behavior normal.         Thought Content: Thought content normal.         Judgment: Judgment normal.             Tray Padron MD  07/03/2025

## 2025-07-28 ENCOUNTER — OFFICE VISIT (OUTPATIENT)
Dept: FAMILY MEDICINE CLINIC | Facility: CLINIC | Age: 75
End: 2025-07-28
Payer: MEDICARE

## 2025-07-28 VITALS
RESPIRATION RATE: 16 BRPM | WEIGHT: 178 LBS | DIASTOLIC BLOOD PRESSURE: 84 MMHG | SYSTOLIC BLOOD PRESSURE: 122 MMHG | OXYGEN SATURATION: 98 % | HEIGHT: 69 IN | BODY MASS INDEX: 26.36 KG/M2 | HEART RATE: 64 BPM

## 2025-07-28 DIAGNOSIS — K59.00 CONSTIPATION, UNSPECIFIED CONSTIPATION TYPE: ICD-10-CM

## 2025-07-28 DIAGNOSIS — G31.84 MILD COGNITIVE IMPAIRMENT: Primary | ICD-10-CM

## 2025-07-28 DIAGNOSIS — I10 PRIMARY HYPERTENSION: ICD-10-CM

## 2025-07-28 PROCEDURE — 1125F AMNT PAIN NOTED PAIN PRSNT: CPT | Performed by: INTERNAL MEDICINE

## 2025-07-28 PROCEDURE — 1159F MED LIST DOCD IN RCRD: CPT | Performed by: INTERNAL MEDICINE

## 2025-07-28 PROCEDURE — 3079F DIAST BP 80-89 MM HG: CPT | Performed by: INTERNAL MEDICINE

## 2025-07-28 PROCEDURE — 3074F SYST BP LT 130 MM HG: CPT | Performed by: INTERNAL MEDICINE

## 2025-07-28 PROCEDURE — 1160F RVW MEDS BY RX/DR IN RCRD: CPT | Performed by: INTERNAL MEDICINE

## 2025-07-28 PROCEDURE — G2211 COMPLEX E/M VISIT ADD ON: HCPCS | Performed by: INTERNAL MEDICINE

## 2025-07-28 PROCEDURE — 99214 OFFICE O/P EST MOD 30 MIN: CPT | Performed by: INTERNAL MEDICINE

## 2025-07-28 NOTE — PROGRESS NOTES
07/28/2025    Assessment & Plan     This 75-year-old patient presents at this time for follow-up of hypertension and tremor.  In the interim visits he was seen by Dr. Decker, neurologist who feels that he also has mild cognitive impairment.  The patient was seen by neuropsychology and will be following up with Dr. Decker in the next month or so.  The neuropsychologist also recommended that the patient be seen by neuro speech and wellness.    With the patient's last visit his blood pressure medication of amlodipine was increased from 2.5 to 5.0 mg.  He relates that he is taking the medication as prescribed and has had no particular problems.  His blood pressure today is 120/80 in the left arm sitting position standard cuff.        He relates that he took the Colace medication we prescribed for chronic constipation and while doing so found that he did not have a problem after stopping the medication the problem returned.  We have instructed him to restart the Colace 100 mg 1 tab p.o. twice daily.  Plan:      Diagnoses and all orders for this visit:    1. Mild cognitive impairment (Primary)  -     Ambulatory Referral to Psychology    2. Constipation, unspecified constipation type    3. Primary hypertension         Plan:  1.)  Restart Colace 100 mg 1 tab p.o. twice daily  2.)  Continue amlodipine 5 mg p.o. every morning for hypertension  3.)  Follow-up in about 3 months after visit with Dr. Decker.         CC: Mild cognitive impairment follow up (3wk follow up)  .        HPI  History of Present Illness     Subjective   Saleem Aguilar is a 75 y.o. male.      The following portions of the patient's history were reviewed and updated as appropriate: allergies, current medications, past family history, past medical history, past social history, past surgical history, and problem list.    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, left    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, right    Essential hypertension, benign    Disease of tongue    Gastroesophageal reflux disease    Right shoulder pain    Tremor    Abnormal electrocardiogram    Acute chest pain    Benign essential tremor    Carpal tunnel syndrome, bilateral    Hypertension    Inguinal hernia    Memory loss    PVC (premature ventricular contraction)    Screening for colon cancer       Past Medical History  Past Medical History:   Diagnosis Date    Benign positional vertigo 2019    Cancer     COPD (chronic obstructive pulmonary disease) 2018    Disease of tongue 2020    Essential hypertension, benign 2012    Gastroesophageal reflux disease 2020    Major depression, single episode 2019    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, right 2017    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 No pertinent family history.    Social History  Social History    Tobacco Use      Smoking status: Former        Packs/day: 0.00        Years: 0.5 packs/day for 5.0 years (2.5 ttl pk-yrs)        Types: Cigarettes        Start date:         Quit date:         Years since quittin.6        Passive exposure: Past      Smokeless tobacco: Never       Is the Patient a current tobacco user? No    Allergies  No Known Allergies    Current  Medications    Current Outpatient Medications:     amLODIPine (NORVASC) 5 MG tablet, Take 1 tablet by mouth Daily., Disp: 30 tablet, Rfl: 2    Azelastine HCl 137 MCG/SPRAY solution, SPRAY 1 TO 2 SPRAYS INTO EACH NOSTRIL TWICE A DAY AS NEEDED, Disp: , Rfl:     B Complex Vitamins (VITAMIN B COMPLEX PO), Take  by mouth., Disp: , Rfl:     Biotin 5000 MCG capsule, Take  by mouth., Disp: , Rfl:     fexofenadine (ALLEGRA) 180 MG tablet, TAKE 1 TABLET BY MOUTH EVERY DAY, Disp: 30 tablet, Rfl: 2    fluticasone (FLONASE) 50 MCG/ACT nasal spray, 2 sprays into the nostril(s) as directed by provider Daily., Disp: 9.9 mL, Rfl: 1    Multiple Vitamins-Minerals (CENTRUM SILVER ADULT 50+ PO), Take  by mouth Daily., Disp: , Rfl:     naproxen (NAPROSYN) 375 MG tablet, TAKE 1 TABLET BY MOUTH TWICE A DAY WITH FOOD, Disp: 40 tablet, Rfl: 1    Omega-3 Fatty Acids (FISH OIL) 1200 MG capsule capsule, Take  by mouth., Disp: , Rfl:     vitamin B-12 (CYANOCOBALAMIN) 1000 MCG tablet, Take 1 tablet by mouth Daily., Disp: , Rfl:     vitamin C (ASCORBIC ACID) 500 MG tablet, Take 1 tablet by mouth Daily., Disp: , Rfl:     Zinc 50 MG tablet, Take  by mouth., Disp: , Rfl:      Review of System  Review of Systems   Constitutional:  Negative for chills and fever.   Respiratory:  Negative for cough and shortness of breath.    Cardiovascular:  Negative for chest pain and palpitations.   Gastrointestinal:  Negative for constipation, diarrhea, nausea and vomiting.   Neurological:  Negative for dizziness and headache.     I have reviewed and confirmed the accuracy of the ROS as documented by the MA/LPN/RN Tray Padron MD    Vitals:    07/28/25 0919   BP: 122/84   Pulse: 64   Resp: 16   SpO2: 98%     Body mass index is 26.17 kg/m².    Objective     Physical Exam  Physical Exam        Tray Padron MD  07/28/2025

## 2025-07-31 RX ORDER — FEXOFENADINE HCL 180 MG/1
180 TABLET ORAL DAILY
Qty: 30 TABLET | Refills: 2 | Status: SHIPPED | OUTPATIENT
Start: 2025-07-31

## (undated) DEVICE — SINGLE-USE BIOPSY FORCEPS: Brand: RADIAL JAW 4

## (undated) DEVICE — THE TORRENT IRRIGATION SCOPE CONNECTOR IS USED WITH THE TORRENT IRRIGATION TUBING TO PROVIDE IRRIGATION FLUIDS SUCH AS STERILE WATER DURING GASTROINTESTINAL ENDOSCOPIC PROCEDURES WHEN USED IN CONJUNCTION WITH AN IRRIGATION PUMP (OR ELECTROSURGICAL UNIT).: Brand: TORRENT

## (undated) DEVICE — CANN O2 ETCO2 FITS ALL CONN CO2 SMPL A/ 7IN DISP LF

## (undated) DEVICE — KT ORCA ORCAPOD DISP STRL

## (undated) DEVICE — TUBING, SUCTION, 1/4" X 10', STRAIGHT: Brand: MEDLINE

## (undated) DEVICE — ADAPT CLN BIOGUARD AIR/H2O DISP

## (undated) DEVICE — SENSR O2 OXIMAX FNGR A/ 18IN NONSTR

## (undated) DEVICE — LN SMPL CO2 SHTRM SD STREAM W/M LUER